# Patient Record
Sex: FEMALE | Race: BLACK OR AFRICAN AMERICAN | NOT HISPANIC OR LATINO | RURAL
[De-identification: names, ages, dates, MRNs, and addresses within clinical notes are randomized per-mention and may not be internally consistent; named-entity substitution may affect disease eponyms.]

---

## 2020-06-16 ENCOUNTER — HISTORICAL (OUTPATIENT)
Dept: ADMINISTRATIVE | Facility: HOSPITAL | Age: 25
End: 2020-06-16

## 2020-06-23 LAB
LAB AP COMMENTS: NORMAL
LAB AP GENERAL CAT - HISTORICAL: NORMAL
LAB AP INTERPRETATION/RESULT - HISTORICAL: NORMAL
LAB AP SPECIMEN ADEQUACY - HISTORICAL: NORMAL
LAB AP SPECIMEN SUBMITTED - HISTORICAL: NORMAL
PAP RECOMMENDATION EXT: NORMAL

## 2021-04-27 ENCOUNTER — CLINICAL SUPPORT (OUTPATIENT)
Dept: OBSTETRICS AND GYNECOLOGY | Facility: CLINIC | Age: 26
End: 2021-04-27
Payer: MEDICAID

## 2021-04-27 VITALS
RESPIRATION RATE: 14 BRPM | HEART RATE: 66 BPM | SYSTOLIC BLOOD PRESSURE: 118 MMHG | HEIGHT: 66 IN | TEMPERATURE: 98 F | WEIGHT: 149 LBS | DIASTOLIC BLOOD PRESSURE: 68 MMHG | BODY MASS INDEX: 23.95 KG/M2

## 2021-04-27 DIAGNOSIS — Z30.42 DEPO-PROVERA CONTRACEPTIVE STATUS: ICD-10-CM

## 2021-04-27 DIAGNOSIS — Z30.42 ENCOUNTER FOR SURVEILLANCE OF INJECTABLE CONTRACEPTIVE: Primary | ICD-10-CM

## 2021-04-27 PROCEDURE — 96372 PR INJECTION,THERAP/PROPH/DIAG2ST, IM OR SUBCUT: ICD-10-PCS | Mod: ,,, | Performed by: MIDWIFE

## 2021-04-27 PROCEDURE — 96372 THER/PROPH/DIAG INJ SC/IM: CPT | Mod: ,,, | Performed by: MIDWIFE

## 2021-04-27 PROCEDURE — 99213 OFFICE O/P EST LOW 20 MIN: CPT | Mod: 25,,, | Performed by: MIDWIFE

## 2021-04-27 PROCEDURE — 99213 PR OFFICE/OUTPT VISIT, EST, LEVL III, 20-29 MIN: ICD-10-PCS | Mod: 25,,, | Performed by: MIDWIFE

## 2021-04-27 RX ORDER — MEDROXYPROGESTERONE ACETATE 150 MG/ML
150 INJECTION, SUSPENSION INTRAMUSCULAR
COMMUNITY
End: 2022-12-21 | Stop reason: ALTCHOICE

## 2021-04-27 RX ORDER — MEDROXYPROGESTERONE ACETATE 150 MG/ML
150 INJECTION, SUSPENSION INTRAMUSCULAR
Status: COMPLETED | OUTPATIENT
Start: 2021-04-27 | End: 2021-04-27

## 2021-04-27 RX ADMIN — MEDROXYPROGESTERONE ACETATE 150 MG: 150 INJECTION, SUSPENSION INTRAMUSCULAR at 09:04

## 2021-07-13 ENCOUNTER — OFFICE VISIT (OUTPATIENT)
Dept: OBSTETRICS AND GYNECOLOGY | Facility: CLINIC | Age: 26
End: 2021-07-13
Payer: MEDICAID

## 2021-07-13 VITALS
RESPIRATION RATE: 16 BRPM | TEMPERATURE: 97 F | BODY MASS INDEX: 24.86 KG/M2 | WEIGHT: 149.19 LBS | DIASTOLIC BLOOD PRESSURE: 70 MMHG | HEART RATE: 80 BPM | SYSTOLIC BLOOD PRESSURE: 100 MMHG | HEIGHT: 65 IN

## 2021-07-13 DIAGNOSIS — Z30.42 DEPO-PROVERA CONTRACEPTIVE STATUS: Primary | ICD-10-CM

## 2021-07-13 DIAGNOSIS — Z30.42 ENCOUNTER FOR SURVEILLANCE OF INJECTABLE CONTRACEPTIVE: ICD-10-CM

## 2021-07-13 PROCEDURE — 96372 THER/PROPH/DIAG INJ SC/IM: CPT | Mod: ,,, | Performed by: MIDWIFE

## 2021-07-13 PROCEDURE — 99213 PR OFFICE/OUTPT VISIT, EST, LEVL III, 20-29 MIN: ICD-10-PCS | Mod: 25,,, | Performed by: MIDWIFE

## 2021-07-13 PROCEDURE — 96372 PR INJECTION,THERAP/PROPH/DIAG2ST, IM OR SUBCUT: ICD-10-PCS | Mod: ,,, | Performed by: MIDWIFE

## 2021-07-13 PROCEDURE — 99213 OFFICE O/P EST LOW 20 MIN: CPT | Mod: 25,,, | Performed by: MIDWIFE

## 2021-07-13 RX ORDER — MEDROXYPROGESTERONE ACETATE 150 MG/ML
150 INJECTION, SUSPENSION INTRAMUSCULAR
Status: COMPLETED | OUTPATIENT
Start: 2021-07-13 | End: 2021-07-13

## 2021-07-13 RX ADMIN — MEDROXYPROGESTERONE ACETATE 150 MG: 150 INJECTION, SUSPENSION INTRAMUSCULAR at 08:07

## 2021-08-17 ENCOUNTER — OFFICE VISIT (OUTPATIENT)
Dept: FAMILY MEDICINE | Facility: CLINIC | Age: 26
End: 2021-08-17
Payer: MEDICAID

## 2021-08-17 VITALS
BODY MASS INDEX: 24.99 KG/M2 | HEIGHT: 65 IN | WEIGHT: 150 LBS | TEMPERATURE: 98 F | RESPIRATION RATE: 20 BRPM | OXYGEN SATURATION: 99 % | HEART RATE: 105 BPM

## 2021-08-17 DIAGNOSIS — Z20.822 EXPOSURE TO COVID-19 VIRUS: Primary | ICD-10-CM

## 2021-08-17 LAB
CTP QC/QA: YES
FLUAV AG NPH QL: NEGATIVE
FLUBV AG NPH QL: NEGATIVE
SARS-COV-2 AG RESP QL IA.RAPID: NEGATIVE

## 2021-08-17 PROCEDURE — 99202 OFFICE O/P NEW SF 15 MIN: CPT | Mod: ,,, | Performed by: NURSE PRACTITIONER

## 2021-08-17 PROCEDURE — 99202 PR OFFICE/OUTPT VISIT, NEW, LEVL II, 15-29 MIN: ICD-10-PCS | Mod: ,,, | Performed by: NURSE PRACTITIONER

## 2021-08-17 PROCEDURE — 87428 SARSCOV & INF VIR A&B AG IA: CPT | Mod: RHCUB | Performed by: NURSE PRACTITIONER

## 2022-12-12 ENCOUNTER — OFFICE VISIT (OUTPATIENT)
Dept: OBSTETRICS AND GYNECOLOGY | Facility: CLINIC | Age: 27
End: 2022-12-12
Payer: MEDICAID

## 2022-12-12 VITALS
RESPIRATION RATE: 17 BRPM | HEART RATE: 92 BPM | HEIGHT: 65 IN | WEIGHT: 142.38 LBS | BODY MASS INDEX: 23.72 KG/M2 | DIASTOLIC BLOOD PRESSURE: 68 MMHG | SYSTOLIC BLOOD PRESSURE: 110 MMHG | OXYGEN SATURATION: 99 %

## 2022-12-12 DIAGNOSIS — N92.6 MENSTRUAL PERIOD LATE: Primary | ICD-10-CM

## 2022-12-12 DIAGNOSIS — N92.6 LATE MENSES: ICD-10-CM

## 2022-12-12 DIAGNOSIS — Z32.02 URINE PREGNANCY TEST NEGATIVE: ICD-10-CM

## 2022-12-12 LAB
B-HCG UR QL: NEGATIVE
CTP QC/QA: YES
HCG SERPL-ACNC: <1 MIU/ML

## 2022-12-12 PROCEDURE — 99213 PR OFFICE/OUTPT VISIT, EST, LEVL III, 20-29 MIN: ICD-10-PCS | Mod: ,,, | Performed by: ADVANCED PRACTICE MIDWIFE

## 2022-12-12 PROCEDURE — 99213 OFFICE O/P EST LOW 20 MIN: CPT | Mod: ,,, | Performed by: ADVANCED PRACTICE MIDWIFE

## 2022-12-12 PROCEDURE — 3074F PR MOST RECENT SYSTOLIC BLOOD PRESSURE < 130 MM HG: ICD-10-PCS | Mod: CPTII,,, | Performed by: ADVANCED PRACTICE MIDWIFE

## 2022-12-12 PROCEDURE — 1159F PR MEDICATION LIST DOCUMENTED IN MEDICAL RECORD: ICD-10-PCS | Mod: CPTII,,, | Performed by: ADVANCED PRACTICE MIDWIFE

## 2022-12-12 PROCEDURE — 81025 URINE PREGNANCY TEST: CPT | Mod: RHCUB | Performed by: ADVANCED PRACTICE MIDWIFE

## 2022-12-12 PROCEDURE — 84702 CHORIONIC GONADOTROPIN TEST: CPT | Mod: ,,, | Performed by: CLINICAL MEDICAL LABORATORY

## 2022-12-12 PROCEDURE — 3078F DIAST BP <80 MM HG: CPT | Mod: CPTII,,, | Performed by: ADVANCED PRACTICE MIDWIFE

## 2022-12-12 PROCEDURE — 1159F MED LIST DOCD IN RCRD: CPT | Mod: CPTII,,, | Performed by: ADVANCED PRACTICE MIDWIFE

## 2022-12-12 PROCEDURE — 3078F PR MOST RECENT DIASTOLIC BLOOD PRESSURE < 80 MM HG: ICD-10-PCS | Mod: CPTII,,, | Performed by: ADVANCED PRACTICE MIDWIFE

## 2022-12-12 PROCEDURE — 3008F PR BODY MASS INDEX (BMI) DOCUMENTED: ICD-10-PCS | Mod: CPTII,,, | Performed by: ADVANCED PRACTICE MIDWIFE

## 2022-12-12 PROCEDURE — 3074F SYST BP LT 130 MM HG: CPT | Mod: CPTII,,, | Performed by: ADVANCED PRACTICE MIDWIFE

## 2022-12-12 PROCEDURE — 84702 HCG, TOTAL, QUANTITATIVE: ICD-10-PCS | Mod: ,,, | Performed by: CLINICAL MEDICAL LABORATORY

## 2022-12-12 PROCEDURE — 3008F BODY MASS INDEX DOCD: CPT | Mod: CPTII,,, | Performed by: ADVANCED PRACTICE MIDWIFE

## 2022-12-12 NOTE — PROGRESS NOTES
"Patient ID:  Alana Hdez is a 27 y.o. female      Chief Complaint:   Chief Complaint   Patient presents with    Amenorrhea    Possible Pregnancy        HPI:  Alana is in for pregnancy testing. She has had negative home UPT, reports monthly cycles which are never late. Last cycle 2022. States had negative UPT initially with previous pregnancy. Sexually active, not on contraceptive, discussed contraceptive options including r/b and possible s/e of implant IUD, injection, ring, patches, and pill. She is considering use of NuvaRing if pregnancy testing negative.   LMP: Patient's last menstrual period was 2022.   Sexually active:  yes  Contraceptive: none      History reviewed. No pertinent past medical history.  History reviewed. No pertinent surgical history.    OB History          1    Para   1    Term   1            AB        Living   1         SAB        IAB        Ectopic        Multiple        Live Births   1                 /68 (BP Location: Right arm)   Pulse 92   Resp 17   Ht 5' 5" (1.651 m)   Wt 64.6 kg (142 lb 6.4 oz)   LMP 2022   SpO2 99%   BMI 23.70 kg/m²   Wt Readings from Last 3 Encounters:   22 64.6 kg (142 lb 6.4 oz)   21 68 kg (150 lb)   21 67.7 kg (149 lb 3.2 oz)          ROS:  Review of Systems   Constitutional: Negative.    Eyes: Negative.    Respiratory: Negative.     Cardiovascular: Negative.    Gastrointestinal: Negative.    Genitourinary:  Positive for menstrual problem.        Late menstrual cycle   Musculoskeletal: Negative.    Neurological: Negative.    Psychiatric/Behavioral: Negative.          PHYSICAL EXAM:  Physical Exam  Constitutional:       Appearance: Normal appearance. She is normal weight.   Eyes:      Extraocular Movements: Extraocular movements intact.   Cardiovascular:      Rate and Rhythm: Normal rate.      Pulses: Normal pulses.   Pulmonary:      Effort: Pulmonary effort is normal.   Abdominal:      " Palpations: Abdomen is soft.   Musculoskeletal:         General: Normal range of motion.      Cervical back: Normal range of motion.   Skin:     General: Skin is warm and dry.   Neurological:      Mental Status: She is alert and oriented to person, place, and time.   Psychiatric:         Mood and Affect: Mood normal.         Behavior: Behavior normal.         Thought Content: Thought content normal.         Judgment: Judgment normal.        Assessment:  Alana was seen today for amenorrhea and possible pregnancy.    Diagnoses and all orders for this visit:    Menstrual period late  -     POCT urine pregnancy  -     hCG, Total, Quantitative; Future  -     hCG, Total, Quantitative    Late menses    BMI 23.0-23.9, adult    Urine pregnancy test negative          ICD-10-CM ICD-9-CM    1. Menstrual period late  N92.6 626.8 POCT urine pregnancy      hCG, Total, Quantitative      hCG, Total, Quantitative      2. Late menses  N92.6 626.8       3. BMI 23.0-23.9, adult  Z68.23 V85.1       4. Urine pregnancy test negative  Z32.02 V72.41           Plan:  UPT negative, hCG level ordered  Will call or send My Chart message after lab reviewed  Discussed contraceptive options as listed above  Will send rx NuvaRing if she desires contraceptive    Follow up for care prn.

## 2022-12-14 PROBLEM — N92.6 LATE MENSES: Status: ACTIVE | Noted: 2022-12-14

## 2022-12-14 PROBLEM — Z30.42 DEPO-PROVERA CONTRACEPTIVE STATUS: Status: RESOLVED | Noted: 2021-04-27 | Resolved: 2022-12-14

## 2022-12-21 ENCOUNTER — OFFICE VISIT (OUTPATIENT)
Dept: OBSTETRICS AND GYNECOLOGY | Facility: CLINIC | Age: 27
End: 2022-12-21
Payer: MEDICAID

## 2022-12-21 VITALS
RESPIRATION RATE: 17 BRPM | DIASTOLIC BLOOD PRESSURE: 73 MMHG | WEIGHT: 145.63 LBS | OXYGEN SATURATION: 99 % | BODY MASS INDEX: 24.26 KG/M2 | HEIGHT: 65 IN | SYSTOLIC BLOOD PRESSURE: 127 MMHG | HEART RATE: 69 BPM

## 2022-12-21 DIAGNOSIS — Z72.51 HIGH RISK SEXUAL BEHAVIOR, UNSPECIFIED TYPE: ICD-10-CM

## 2022-12-21 DIAGNOSIS — Z30.013 ENCOUNTER FOR INITIAL PRESCRIPTION OF INJECTABLE CONTRACEPTIVE: Primary | ICD-10-CM

## 2022-12-21 LAB
CANDIDA SPECIES: NEGATIVE
GARDNERELLA: POSITIVE
TRICHOMONAS: NEGATIVE

## 2022-12-21 PROCEDURE — 99212 OFFICE O/P EST SF 10 MIN: CPT | Mod: 25,,, | Performed by: ADVANCED PRACTICE MIDWIFE

## 2022-12-21 PROCEDURE — 3008F PR BODY MASS INDEX (BMI) DOCUMENTED: ICD-10-PCS | Mod: CPTII,,, | Performed by: ADVANCED PRACTICE MIDWIFE

## 2022-12-21 PROCEDURE — 1159F PR MEDICATION LIST DOCUMENTED IN MEDICAL RECORD: ICD-10-PCS | Mod: CPTII,,, | Performed by: ADVANCED PRACTICE MIDWIFE

## 2022-12-21 PROCEDURE — 3078F DIAST BP <80 MM HG: CPT | Mod: CPTII,,, | Performed by: ADVANCED PRACTICE MIDWIFE

## 2022-12-21 PROCEDURE — 96372 THER/PROPH/DIAG INJ SC/IM: CPT | Mod: ,,, | Performed by: ADVANCED PRACTICE MIDWIFE

## 2022-12-21 PROCEDURE — 87660 BACTERIAL VAGINOSIS: ICD-10-PCS | Mod: ,,, | Performed by: CLINICAL MEDICAL LABORATORY

## 2022-12-21 PROCEDURE — 87480 CANDIDA DNA DIR PROBE: CPT | Mod: ,,, | Performed by: CLINICAL MEDICAL LABORATORY

## 2022-12-21 PROCEDURE — 3074F SYST BP LT 130 MM HG: CPT | Mod: CPTII,,, | Performed by: ADVANCED PRACTICE MIDWIFE

## 2022-12-21 PROCEDURE — 96372 PR INJECTION,THERAP/PROPH/DIAG2ST, IM OR SUBCUT: ICD-10-PCS | Mod: ,,, | Performed by: ADVANCED PRACTICE MIDWIFE

## 2022-12-21 PROCEDURE — 3074F PR MOST RECENT SYSTOLIC BLOOD PRESSURE < 130 MM HG: ICD-10-PCS | Mod: CPTII,,, | Performed by: ADVANCED PRACTICE MIDWIFE

## 2022-12-21 PROCEDURE — 87480 BACTERIAL VAGINOSIS: ICD-10-PCS | Mod: ,,, | Performed by: CLINICAL MEDICAL LABORATORY

## 2022-12-21 PROCEDURE — 87660 TRICHOMONAS VAGIN DIR PROBE: CPT | Mod: ,,, | Performed by: CLINICAL MEDICAL LABORATORY

## 2022-12-21 PROCEDURE — 1159F MED LIST DOCD IN RCRD: CPT | Mod: CPTII,,, | Performed by: ADVANCED PRACTICE MIDWIFE

## 2022-12-21 PROCEDURE — 87510 BACTERIAL VAGINOSIS: ICD-10-PCS | Mod: ,,, | Performed by: CLINICAL MEDICAL LABORATORY

## 2022-12-21 PROCEDURE — 3008F BODY MASS INDEX DOCD: CPT | Mod: CPTII,,, | Performed by: ADVANCED PRACTICE MIDWIFE

## 2022-12-21 PROCEDURE — 99212 PR OFFICE/OUTPT VISIT, EST, LEVL II, 10-19 MIN: ICD-10-PCS | Mod: 25,,, | Performed by: ADVANCED PRACTICE MIDWIFE

## 2022-12-21 PROCEDURE — 87510 GARDNER VAG DNA DIR PROBE: CPT | Mod: ,,, | Performed by: CLINICAL MEDICAL LABORATORY

## 2022-12-21 PROCEDURE — 3078F PR MOST RECENT DIASTOLIC BLOOD PRESSURE < 80 MM HG: ICD-10-PCS | Mod: CPTII,,, | Performed by: ADVANCED PRACTICE MIDWIFE

## 2022-12-21 RX ORDER — MEDROXYPROGESTERONE ACETATE 150 MG/ML
150 INJECTION, SUSPENSION INTRAMUSCULAR
Status: COMPLETED | OUTPATIENT
Start: 2022-12-21 | End: 2022-12-21

## 2022-12-21 RX ADMIN — MEDROXYPROGESTERONE ACETATE 150 MG: 150 INJECTION, SUSPENSION INTRAMUSCULAR at 01:12

## 2022-12-21 NOTE — PROGRESS NOTES
"    Patient ID:  Alana Hdez is a 27 y.o. female      Chief Complaint:   Chief Complaint   Patient presents with    Blood when urinating        HPI: Ms Hdez started her period on 22 and is still notices some blood on th e tissue when she wipes.  She is concerned about this.   She is no on any b;irth control at this time.  We talked about option and she wants Depo.  She has used it in the past.   Her last periods was a week late so she did a home preg. Test during her cycle and it was negative.  Last intercourse was 12/10/22      LMP: Patient's last menstrual period was 2022.  Sexually active: yes    History reviewed. No pertinent past medical history.    History reviewed. No pertinent surgical history.    OB History          1    Para   1    Term   1            AB        Living   1         SAB        IAB        Ectopic        Multiple        Live Births   1                 /73 (BP Location: Right arm)   Pulse 69   Resp 17   Ht 5' 5" (1.651 m)   Wt 66 kg (145 lb 9.6 oz)   LMP 2022   SpO2 99%   BMI 24.23 kg/m²   Wt Readings from Last 3 Encounters:   22 66 kg (145 lb 9.6 oz)   22 64.6 kg (142 lb 6.4 oz)   21 68 kg (150 lb)          ROS:  Review of Systems   HENT: Negative.     Gastrointestinal: Negative.    Genitourinary:  Positive for vaginal bleeding.   Musculoskeletal: Negative.    All other systems reviewed and are negative.       PHYSICAL EXAM:  Physical Exam  Constitutional:       Appearance: Normal appearance.   HENT:      Head: Normocephalic.   Eyes:      Extraocular Movements: Extraocular movements intact.   Cardiovascular:      Rate and Rhythm: Normal rate.      Pulses: Normal pulses.   Pulmonary:      Effort: Pulmonary effort is normal.   Musculoskeletal:         General: Normal range of motion.      Cervical back: Normal range of motion.   Skin:     General: Skin is warm and dry.   Neurological:      Mental Status: She is alert and " "oriented to person, place, and time.   Psychiatric:         Mood and Affect: Mood normal.         Behavior: Behavior normal.         Thought Content: Thought content normal.         Judgment: Judgment normal.        Assessment:  Encounter for initial prescription of injectable contraceptive  -     medroxyPROGESTERone (DEPO-PROVERA) injection 150 mg    High risk sexual behavior, unspecified type  -     Bacterial Vaginosis; Future; Expected date: 12/21/2022          ICD-10-CM ICD-9-CM    1. Encounter for initial prescription of injectable contraceptive  Z30.013 V25.02 medroxyPROGESTERone (DEPO-PROVERA) injection 150 mg      2. High risk sexual behavior, unspecified type  Z72.51 V69.2 Bacterial Vaginosis      Bacterial Vaginosis          Plan:Call result of Affirm  Depo Provera 150 mg IM given, Reviewed "ACHES" of contraceptives and possible S/E  Encouraged Vitamin D and calcium supplements or daily multivitamin  Instructed on condom use during each sexual encounter    Follow up in about 2 months (around 2/21/2023) for annual and return 3 months for Depo.                   "

## 2022-12-22 ENCOUNTER — PATIENT MESSAGE (OUTPATIENT)
Dept: OBSTETRICS AND GYNECOLOGY | Facility: CLINIC | Age: 27
End: 2022-12-22
Payer: MEDICAID

## 2022-12-22 DIAGNOSIS — B96.89 BACTERIAL VAGINOSIS: Primary | ICD-10-CM

## 2022-12-22 DIAGNOSIS — N76.0 BACTERIAL VAGINOSIS: Primary | ICD-10-CM

## 2022-12-22 RX ORDER — METRONIDAZOLE 500 MG/1
500 TABLET ORAL 2 TIMES DAILY
Qty: 14 TABLET | Refills: 0 | Status: SHIPPED | OUTPATIENT
Start: 2022-12-22 | End: 2022-12-29

## 2023-03-08 ENCOUNTER — OFFICE VISIT (OUTPATIENT)
Dept: OBSTETRICS AND GYNECOLOGY | Facility: CLINIC | Age: 28
End: 2023-03-08
Payer: MEDICAID

## 2023-03-08 VITALS
TEMPERATURE: 99 F | OXYGEN SATURATION: 99 % | RESPIRATION RATE: 18 BRPM | DIASTOLIC BLOOD PRESSURE: 59 MMHG | HEIGHT: 65 IN | BODY MASS INDEX: 22.99 KG/M2 | HEART RATE: 87 BPM | WEIGHT: 138 LBS | SYSTOLIC BLOOD PRESSURE: 95 MMHG

## 2023-03-08 DIAGNOSIS — Z30.42 SURVEILLANCE FOR DEPO-PROVERA CONTRACEPTION: Primary | ICD-10-CM

## 2023-03-08 PROCEDURE — 1159F MED LIST DOCD IN RCRD: CPT | Mod: CPTII,,, | Performed by: ADVANCED PRACTICE MIDWIFE

## 2023-03-08 PROCEDURE — 3008F PR BODY MASS INDEX (BMI) DOCUMENTED: ICD-10-PCS | Mod: CPTII,,, | Performed by: ADVANCED PRACTICE MIDWIFE

## 2023-03-08 PROCEDURE — 99212 OFFICE O/P EST SF 10 MIN: CPT | Mod: 25,,, | Performed by: ADVANCED PRACTICE MIDWIFE

## 2023-03-08 PROCEDURE — 99212 PR OFFICE/OUTPT VISIT, EST, LEVL II, 10-19 MIN: ICD-10-PCS | Mod: 25,,, | Performed by: ADVANCED PRACTICE MIDWIFE

## 2023-03-08 PROCEDURE — 3074F SYST BP LT 130 MM HG: CPT | Mod: CPTII,,, | Performed by: ADVANCED PRACTICE MIDWIFE

## 2023-03-08 PROCEDURE — 96372 THER/PROPH/DIAG INJ SC/IM: CPT | Mod: ,,, | Performed by: ADVANCED PRACTICE MIDWIFE

## 2023-03-08 PROCEDURE — 96372 PR INJECTION,THERAP/PROPH/DIAG2ST, IM OR SUBCUT: ICD-10-PCS | Mod: ,,, | Performed by: ADVANCED PRACTICE MIDWIFE

## 2023-03-08 PROCEDURE — 3078F DIAST BP <80 MM HG: CPT | Mod: CPTII,,, | Performed by: ADVANCED PRACTICE MIDWIFE

## 2023-03-08 PROCEDURE — 1159F PR MEDICATION LIST DOCUMENTED IN MEDICAL RECORD: ICD-10-PCS | Mod: CPTII,,, | Performed by: ADVANCED PRACTICE MIDWIFE

## 2023-03-08 PROCEDURE — 3078F PR MOST RECENT DIASTOLIC BLOOD PRESSURE < 80 MM HG: ICD-10-PCS | Mod: CPTII,,, | Performed by: ADVANCED PRACTICE MIDWIFE

## 2023-03-08 PROCEDURE — 3008F BODY MASS INDEX DOCD: CPT | Mod: CPTII,,, | Performed by: ADVANCED PRACTICE MIDWIFE

## 2023-03-08 PROCEDURE — 3074F PR MOST RECENT SYSTOLIC BLOOD PRESSURE < 130 MM HG: ICD-10-PCS | Mod: CPTII,,, | Performed by: ADVANCED PRACTICE MIDWIFE

## 2023-03-08 RX ORDER — MEDROXYPROGESTERONE ACETATE 150 MG/ML
150 INJECTION, SUSPENSION INTRAMUSCULAR
Status: COMPLETED | OUTPATIENT
Start: 2023-03-08 | End: 2023-03-08

## 2023-03-08 RX ADMIN — MEDROXYPROGESTERONE ACETATE 150 MG: 150 INJECTION, SUSPENSION INTRAMUSCULAR at 02:03

## 2023-03-08 NOTE — PROGRESS NOTES
"    Patient ID:  Alana Hdez is a 28 y.o. female      Chief Complaint:   Chief Complaint   Patient presents with    Contraception     Room 4// depo        HPI:  Alana Hdez is in for repeat depo injection within dates. Denies ACHES, vag discharge, and abnormal vag bleeding. No issues, problems, or complaints. Desires to continue depo injections.   LMP: Patient's last menstrual period was 2023 (exact date).  Sexually active: yes    No past medical history on file.    No past surgical history on file.    OB History          1    Para   1    Term   1            AB        Living   1         SAB        IAB        Ectopic        Multiple        Live Births   1                 BP (!) 95/59   Pulse 87   Temp 98.7 °F (37.1 °C) (Oral)   Resp 18   Ht 5' 5" (1.651 m)   Wt 62.6 kg (138 lb)   LMP 2023 (Exact Date)   SpO2 99%   BMI 22.96 kg/m²   Wt Readings from Last 3 Encounters:   23 62.6 kg (138 lb)   22 66 kg (145 lb 9.6 oz)   22 64.6 kg (142 lb 6.4 oz)          ROS:  Review of Systems   Constitutional: Negative.    HENT: Negative.     Respiratory: Negative.     Cardiovascular: Negative.    Gastrointestinal: Negative.    Genitourinary: Negative.    Musculoskeletal: Negative.    Integumentary:  Negative.   Neurological: Negative.    Psychiatric/Behavioral: Negative.     Breast: negative.         PHYSICAL EXAM:  Physical Exam  Vitals and nursing note reviewed.   Constitutional:       Appearance: Normal appearance.   HENT:      Head: Normocephalic.   Eyes:      Extraocular Movements: Extraocular movements intact.   Cardiovascular:      Rate and Rhythm: Normal rate.      Pulses: Normal pulses.   Pulmonary:      Effort: Pulmonary effort is normal.   Musculoskeletal:         General: Normal range of motion.      Cervical back: Normal range of motion.   Skin:     General: Skin is warm and dry.   Neurological:      Mental Status: She is alert and oriented to " "person, place, and time.   Psychiatric:         Mood and Affect: Mood normal.         Behavior: Behavior normal.         Thought Content: Thought content normal.         Judgment: Judgment normal.        Assessment:  Surveillance for Depo-Provera contraception  -     medroxyPROGESTERone (DEPO-PROVERA) injection 150 mg          ICD-10-CM ICD-9-CM    1. Surveillance for Depo-Provera contraception  Z30.42 V25.49 medroxyPROGESTERone (DEPO-PROVERA) injection 150 mg          Plan:  Depo Provera 150 mg IM given, Reviewed "ACHES" of contraceptives and possible S/E  Encouraged Vitamin D and calcium supplements or daily multivitamin  Instructed on condom use during each sexual encounter to decrease STD exposure risk    No follow-ups on file.                   "

## 2023-05-25 ENCOUNTER — PATIENT OUTREACH (OUTPATIENT)
Dept: OBSTETRICS AND GYNECOLOGY | Facility: CLINIC | Age: 28
End: 2023-05-25
Payer: MEDICAID

## 2023-05-25 ENCOUNTER — OFFICE VISIT (OUTPATIENT)
Dept: OBSTETRICS AND GYNECOLOGY | Facility: CLINIC | Age: 28
End: 2023-05-25
Payer: MEDICAID

## 2023-05-25 VITALS
WEIGHT: 154.19 LBS | BODY MASS INDEX: 25.69 KG/M2 | HEART RATE: 91 BPM | SYSTOLIC BLOOD PRESSURE: 109 MMHG | RESPIRATION RATE: 17 BRPM | OXYGEN SATURATION: 99 % | HEIGHT: 65 IN | TEMPERATURE: 98 F | DIASTOLIC BLOOD PRESSURE: 73 MMHG

## 2023-05-25 DIAGNOSIS — Z30.42 SURVEILLANCE FOR DEPO-PROVERA CONTRACEPTION: Primary | ICD-10-CM

## 2023-05-25 PROBLEM — N92.6 LATE MENSES: Status: RESOLVED | Noted: 2022-12-14 | Resolved: 2023-05-25

## 2023-05-25 PROCEDURE — 99499 UNLISTED E&M SERVICE: CPT | Mod: ,,, | Performed by: ADVANCED PRACTICE MIDWIFE

## 2023-05-25 PROCEDURE — 1159F MED LIST DOCD IN RCRD: CPT | Mod: CPTII,,, | Performed by: ADVANCED PRACTICE MIDWIFE

## 2023-05-25 PROCEDURE — 3074F PR MOST RECENT SYSTOLIC BLOOD PRESSURE < 130 MM HG: ICD-10-PCS | Mod: CPTII,,, | Performed by: ADVANCED PRACTICE MIDWIFE

## 2023-05-25 PROCEDURE — 3078F PR MOST RECENT DIASTOLIC BLOOD PRESSURE < 80 MM HG: ICD-10-PCS | Mod: CPTII,,, | Performed by: ADVANCED PRACTICE MIDWIFE

## 2023-05-25 PROCEDURE — 3074F SYST BP LT 130 MM HG: CPT | Mod: CPTII,,, | Performed by: ADVANCED PRACTICE MIDWIFE

## 2023-05-25 PROCEDURE — 1159F PR MEDICATION LIST DOCUMENTED IN MEDICAL RECORD: ICD-10-PCS | Mod: CPTII,,, | Performed by: ADVANCED PRACTICE MIDWIFE

## 2023-05-25 PROCEDURE — 3078F DIAST BP <80 MM HG: CPT | Mod: CPTII,,, | Performed by: ADVANCED PRACTICE MIDWIFE

## 2023-05-25 PROCEDURE — 99499 NO LOS: ICD-10-PCS | Mod: ,,, | Performed by: ADVANCED PRACTICE MIDWIFE

## 2023-05-25 PROCEDURE — 3008F PR BODY MASS INDEX (BMI) DOCUMENTED: ICD-10-PCS | Mod: CPTII,,, | Performed by: ADVANCED PRACTICE MIDWIFE

## 2023-05-25 PROCEDURE — 96372 PR INJECTION,THERAP/PROPH/DIAG2ST, IM OR SUBCUT: ICD-10-PCS | Mod: ,,, | Performed by: ADVANCED PRACTICE MIDWIFE

## 2023-05-25 PROCEDURE — 96372 THER/PROPH/DIAG INJ SC/IM: CPT | Mod: ,,, | Performed by: ADVANCED PRACTICE MIDWIFE

## 2023-05-25 PROCEDURE — 3008F BODY MASS INDEX DOCD: CPT | Mod: CPTII,,, | Performed by: ADVANCED PRACTICE MIDWIFE

## 2023-05-25 RX ORDER — MEDROXYPROGESTERONE ACETATE 150 MG/ML
150 INJECTION, SUSPENSION INTRAMUSCULAR ONCE
Status: COMPLETED | OUTPATIENT
Start: 2023-05-25 | End: 2023-05-25

## 2023-05-25 RX ADMIN — MEDROXYPROGESTERONE ACETATE 150 MG: 150 INJECTION, SUSPENSION INTRAMUSCULAR at 08:05

## 2023-05-25 NOTE — PROGRESS NOTES
"  Patient ID:  Alana Hdez is a 28 y.o. female      Chief Complaint:   Chief Complaint   Patient presents with    Contraception     Depo        HPI:  Alana Hdez is in for repeat depo injection within dates. Denies ACHES, vag discharge, and abnormal vag bleeding. No issues, problems, or complaints. Desires to continue depo injections. Discussed last Pap 2020 ASCUS, due for annual exam with Pap, encouraged to schedule.   LMP: No LMP recorded. Patient has had an injection.  Sexually active: yes    History reviewed. No pertinent past medical history.    History reviewed. No pertinent surgical history.    OB History          1    Para   1    Term   1            AB        Living   1         SAB        IAB        Ectopic        Multiple        Live Births   1                 /73 (BP Location: Right arm)   Pulse 91   Temp 97.7 °F (36.5 °C)   Resp 17   Ht 5' 5" (1.651 m)   Wt 69.9 kg (154 lb 3.2 oz)   SpO2 99%   BMI 25.66 kg/m²   Wt Readings from Last 3 Encounters:   23 69.9 kg (154 lb 3.2 oz)   23 62.6 kg (138 lb)   22 66 kg (145 lb 9.6 oz)          ROS:  Review of Systems   Constitutional: Negative.    Eyes: Negative.    Respiratory: Negative.     Cardiovascular: Negative.    Gastrointestinal: Negative.    Genitourinary: Negative.    Musculoskeletal: Negative.    Neurological: Negative.    Psychiatric/Behavioral: Negative.          PHYSICAL EXAM:  Physical Exam  Constitutional:       Appearance: Normal appearance. She is normal weight.   Eyes:      Extraocular Movements: Extraocular movements intact.   Cardiovascular:      Rate and Rhythm: Normal rate.      Pulses: Normal pulses.   Pulmonary:      Effort: Pulmonary effort is normal.   Musculoskeletal:         General: Normal range of motion.      Cervical back: Normal range of motion.   Skin:     General: Skin is warm and dry.   Neurological:      Mental Status: She is alert and oriented to person, " "place, and time.   Psychiatric:         Mood and Affect: Mood normal.         Behavior: Behavior normal.         Thought Content: Thought content normal.         Judgment: Judgment normal.        Assessment:  Surveillance for Depo-Provera contraception  -     medroxyPROGESTERone (DEPO-PROVERA) syringe 150 mg    BMI 25.0-25.9,adult          ICD-10-CM ICD-9-CM    1. Surveillance for Depo-Provera contraception  Z30.42 V25.49 medroxyPROGESTERone (DEPO-PROVERA) syringe 150 mg      2. BMI 25.0-25.9,adult  Z68.25 V85.21           Plan:  Depo Provera 150 mg IM given, Reviewed "ACHES" of contraceptives and possible S/E  Encouraged Vitamin D and calcium supplements or daily multivitamin  Instructed on condom use during each sexual encounter to decrease STD exposure risk  Discussed Pap testing guidelines, due for annual exam with Pap    Follow up in about 3 months (around 8/25/2023) for repeat depo injection.                    "

## 2023-06-12 ENCOUNTER — OFFICE VISIT (OUTPATIENT)
Dept: OBSTETRICS AND GYNECOLOGY | Facility: CLINIC | Age: 28
End: 2023-06-12
Payer: MEDICAID

## 2023-06-12 VITALS
TEMPERATURE: 99 F | HEIGHT: 65 IN | OXYGEN SATURATION: 99 % | SYSTOLIC BLOOD PRESSURE: 112 MMHG | WEIGHT: 151.63 LBS | DIASTOLIC BLOOD PRESSURE: 79 MMHG | BODY MASS INDEX: 25.26 KG/M2 | HEART RATE: 86 BPM

## 2023-06-12 DIAGNOSIS — Z20.2 ENCOUNTER FOR ASSESSMENT OF STD EXPOSURE: ICD-10-CM

## 2023-06-12 DIAGNOSIS — N89.8 VAGINAL DISCHARGE: Primary | ICD-10-CM

## 2023-06-12 LAB
CANDIDA SPECIES: NEGATIVE
GARDNERELLA: POSITIVE
TRICHOMONAS: NEGATIVE

## 2023-06-12 PROCEDURE — 87591 CHLAMYDIA/GONORRHOEAE(GC), PCR: ICD-10-PCS | Mod: ,,, | Performed by: CLINICAL MEDICAL LABORATORY

## 2023-06-12 PROCEDURE — 3074F SYST BP LT 130 MM HG: CPT | Mod: CPTII,,, | Performed by: ADVANCED PRACTICE MIDWIFE

## 2023-06-12 PROCEDURE — 3008F PR BODY MASS INDEX (BMI) DOCUMENTED: ICD-10-PCS | Mod: CPTII,,, | Performed by: ADVANCED PRACTICE MIDWIFE

## 2023-06-12 PROCEDURE — 3074F PR MOST RECENT SYSTOLIC BLOOD PRESSURE < 130 MM HG: ICD-10-PCS | Mod: CPTII,,, | Performed by: ADVANCED PRACTICE MIDWIFE

## 2023-06-12 PROCEDURE — 87491 CHLMYD TRACH DNA AMP PROBE: CPT | Mod: ,,, | Performed by: CLINICAL MEDICAL LABORATORY

## 2023-06-12 PROCEDURE — 87660 BACTERIAL VAGINOSIS: ICD-10-PCS | Mod: ,,, | Performed by: CLINICAL MEDICAL LABORATORY

## 2023-06-12 PROCEDURE — 87480 BACTERIAL VAGINOSIS: ICD-10-PCS | Mod: ,,, | Performed by: CLINICAL MEDICAL LABORATORY

## 2023-06-12 PROCEDURE — 3078F PR MOST RECENT DIASTOLIC BLOOD PRESSURE < 80 MM HG: ICD-10-PCS | Mod: CPTII,,, | Performed by: ADVANCED PRACTICE MIDWIFE

## 2023-06-12 PROCEDURE — 87510 GARDNER VAG DNA DIR PROBE: CPT | Mod: ,,, | Performed by: CLINICAL MEDICAL LABORATORY

## 2023-06-12 PROCEDURE — 87591 N.GONORRHOEAE DNA AMP PROB: CPT | Mod: ,,, | Performed by: CLINICAL MEDICAL LABORATORY

## 2023-06-12 PROCEDURE — 87510 BACTERIAL VAGINOSIS: ICD-10-PCS | Mod: ,,, | Performed by: CLINICAL MEDICAL LABORATORY

## 2023-06-12 PROCEDURE — 99213 PR OFFICE/OUTPT VISIT, EST, LEVL III, 20-29 MIN: ICD-10-PCS | Mod: ,,, | Performed by: ADVANCED PRACTICE MIDWIFE

## 2023-06-12 PROCEDURE — 87491 CHLAMYDIA/GONORRHOEAE(GC), PCR: ICD-10-PCS | Mod: ,,, | Performed by: CLINICAL MEDICAL LABORATORY

## 2023-06-12 PROCEDURE — 1159F PR MEDICATION LIST DOCUMENTED IN MEDICAL RECORD: ICD-10-PCS | Mod: CPTII,,, | Performed by: ADVANCED PRACTICE MIDWIFE

## 2023-06-12 PROCEDURE — 3008F BODY MASS INDEX DOCD: CPT | Mod: CPTII,,, | Performed by: ADVANCED PRACTICE MIDWIFE

## 2023-06-12 PROCEDURE — 87480 CANDIDA DNA DIR PROBE: CPT | Mod: ,,, | Performed by: CLINICAL MEDICAL LABORATORY

## 2023-06-12 PROCEDURE — 1159F MED LIST DOCD IN RCRD: CPT | Mod: CPTII,,, | Performed by: ADVANCED PRACTICE MIDWIFE

## 2023-06-12 PROCEDURE — 99213 OFFICE O/P EST LOW 20 MIN: CPT | Mod: ,,, | Performed by: ADVANCED PRACTICE MIDWIFE

## 2023-06-12 PROCEDURE — 3078F DIAST BP <80 MM HG: CPT | Mod: CPTII,,, | Performed by: ADVANCED PRACTICE MIDWIFE

## 2023-06-12 PROCEDURE — 87660 TRICHOMONAS VAGIN DIR PROBE: CPT | Mod: ,,, | Performed by: CLINICAL MEDICAL LABORATORY

## 2023-06-13 DIAGNOSIS — B96.89 BACTERIAL VAGINAL INFECTION: Primary | ICD-10-CM

## 2023-06-13 DIAGNOSIS — N76.0 BACTERIAL VAGINAL INFECTION: Primary | ICD-10-CM

## 2023-06-13 LAB
CHLAMYDIA BY PCR: NEGATIVE
N. GONORRHOEAE (GC) BY PCR: NEGATIVE

## 2023-06-13 RX ORDER — FLUCONAZOLE 150 MG/1
150 TABLET ORAL
Qty: 2 TABLET | Refills: 0 | Status: SHIPPED | OUTPATIENT
Start: 2023-06-13 | End: 2023-06-21

## 2023-06-13 RX ORDER — METRONIDAZOLE 500 MG/1
500 TABLET ORAL 2 TIMES DAILY
Qty: 14 TABLET | Refills: 0 | Status: SHIPPED | OUTPATIENT
Start: 2023-06-13 | End: 2023-06-20

## 2023-06-14 PROBLEM — N89.8 VAGINAL DISCHARGE: Status: ACTIVE | Noted: 2023-06-14

## 2023-06-15 NOTE — PROGRESS NOTES
"Patient ID:  Alana Hdez is a 28 y.o. female      Chief Complaint:   Chief Complaint   Patient presents with    Vaginal Bleeding     Old blood   Next depo: 8/10  Last pap: 2020        HPI:  Alana is in with concerns about vag discharge/vag bleeding. Reports bleeding is usually bright red but had an episode of brown discharge like old blood for a few day. No bleeding or discharge today. Currently on depo for contraceptive. Discussed brown discharge was probably old blood and light bleeding/spotting is common with depo. Agrees to STD testing.  Due for annual exam with Pap.   LMP: Patient's last menstrual period was 2023.   Sexually active:  yes  Contraceptive: depo      History reviewed. No pertinent past medical history.  History reviewed. No pertinent surgical history.    OB History          1    Para   1    Term   1            AB        Living   1         SAB        IAB        Ectopic        Multiple        Live Births   1                 /79 (BP Location: Right arm, Patient Position: Sitting, BP Method: Large (Automatic))   Pulse 86   Temp 99.2 °F (37.3 °C)   Ht 5' 5" (1.651 m)   Wt 68.8 kg (151 lb 9.6 oz)   LMP 2023   SpO2 99%   BMI 25.23 kg/m²   Wt Readings from Last 3 Encounters:   23 68.8 kg (151 lb 9.6 oz)   23 69.9 kg (154 lb 3.2 oz)   23 62.6 kg (138 lb)          ROS:  Review of Systems   Constitutional: Negative.    Eyes: Negative.    Respiratory: Negative.     Cardiovascular: Negative.    Gastrointestinal: Negative.    Genitourinary:  Positive for vaginal discharge.   Musculoskeletal: Negative.    Neurological: Negative.    Psychiatric/Behavioral: Negative.          PHYSICAL EXAM:  Physical Exam  Constitutional:       Appearance: Normal appearance. She is normal weight.   Eyes:      Extraocular Movements: Extraocular movements intact.   Cardiovascular:      Rate and Rhythm: Normal rate.      Pulses: Normal pulses.   Pulmonary:      " Effort: Pulmonary effort is normal.   Musculoskeletal:         General: Normal range of motion.      Cervical back: Normal range of motion.   Skin:     General: Skin is warm and dry.   Neurological:      Mental Status: She is alert and oriented to person, place, and time.   Psychiatric:         Mood and Affect: Mood normal.         Behavior: Behavior normal.         Thought Content: Thought content normal.         Judgment: Judgment normal.        Assessment:  Alana was seen today for vaginal bleeding.    Diagnoses and all orders for this visit:    Vaginal discharge  -     Bacterial Vaginosis; Future  -     Chlamydia/GC, PCR; Future  -     Bacterial Vaginosis  -     Chlamydia/GC, PCR    Encounter for assessment of STD exposure  -     Bacterial Vaginosis; Future  -     Chlamydia/GC, PCR; Future  -     Bacterial Vaginosis  -     Chlamydia/GC, PCR    BMI 25.0-25.9,adult          ICD-10-CM ICD-9-CM    1. Vaginal discharge  N89.8 623.5 Bacterial Vaginosis      Chlamydia/GC, PCR      Bacterial Vaginosis      Chlamydia/GC, PCR      2. Encounter for assessment of STD exposure  Z76.89 V72.85 Bacterial Vaginosis      Chlamydia/GC, PCR      Bacterial Vaginosis      Chlamydia/GC, PCR      3. BMI 25.0-25.9,adult  Z68.25 V85.21           Plan:  Affirm swab self collected  Urine send for GC/CT testing  Will call or send My Chart message after results reviewed  Encouraged use of condoms with each sexual encounter to decrease STD exposure risk    Follow up for care prn,  repeat depo as scheduled, due for Pap.

## 2023-06-27 ENCOUNTER — PATIENT MESSAGE (OUTPATIENT)
Dept: RESEARCH | Facility: HOSPITAL | Age: 28
End: 2023-06-27

## 2023-07-10 ENCOUNTER — OFFICE VISIT (OUTPATIENT)
Dept: OBSTETRICS AND GYNECOLOGY | Facility: CLINIC | Age: 28
End: 2023-07-10
Payer: MEDICAID

## 2023-07-10 VITALS
OXYGEN SATURATION: 99 % | HEIGHT: 65 IN | WEIGHT: 154.19 LBS | HEART RATE: 85 BPM | RESPIRATION RATE: 17 BRPM | TEMPERATURE: 99 F | SYSTOLIC BLOOD PRESSURE: 119 MMHG | DIASTOLIC BLOOD PRESSURE: 85 MMHG | BODY MASS INDEX: 25.69 KG/M2

## 2023-07-10 DIAGNOSIS — F41.1 GAD (GENERALIZED ANXIETY DISORDER): Primary | ICD-10-CM

## 2023-07-10 PROCEDURE — 1159F MED LIST DOCD IN RCRD: CPT | Mod: CPTII,,, | Performed by: ADVANCED PRACTICE MIDWIFE

## 2023-07-10 PROCEDURE — 3074F PR MOST RECENT SYSTOLIC BLOOD PRESSURE < 130 MM HG: ICD-10-PCS | Mod: CPTII,,, | Performed by: ADVANCED PRACTICE MIDWIFE

## 2023-07-10 PROCEDURE — 3079F DIAST BP 80-89 MM HG: CPT | Mod: CPTII,,, | Performed by: ADVANCED PRACTICE MIDWIFE

## 2023-07-10 PROCEDURE — 3008F PR BODY MASS INDEX (BMI) DOCUMENTED: ICD-10-PCS | Mod: CPTII,,, | Performed by: ADVANCED PRACTICE MIDWIFE

## 2023-07-10 PROCEDURE — 3079F PR MOST RECENT DIASTOLIC BLOOD PRESSURE 80-89 MM HG: ICD-10-PCS | Mod: CPTII,,, | Performed by: ADVANCED PRACTICE MIDWIFE

## 2023-07-10 PROCEDURE — 1159F PR MEDICATION LIST DOCUMENTED IN MEDICAL RECORD: ICD-10-PCS | Mod: CPTII,,, | Performed by: ADVANCED PRACTICE MIDWIFE

## 2023-07-10 PROCEDURE — 99213 PR OFFICE/OUTPT VISIT, EST, LEVL III, 20-29 MIN: ICD-10-PCS | Mod: ,,, | Performed by: ADVANCED PRACTICE MIDWIFE

## 2023-07-10 PROCEDURE — 99213 OFFICE O/P EST LOW 20 MIN: CPT | Mod: ,,, | Performed by: ADVANCED PRACTICE MIDWIFE

## 2023-07-10 PROCEDURE — 3074F SYST BP LT 130 MM HG: CPT | Mod: CPTII,,, | Performed by: ADVANCED PRACTICE MIDWIFE

## 2023-07-10 PROCEDURE — 3008F BODY MASS INDEX DOCD: CPT | Mod: CPTII,,, | Performed by: ADVANCED PRACTICE MIDWIFE

## 2023-07-10 RX ORDER — SERTRALINE HYDROCHLORIDE 25 MG/1
25 TABLET, FILM COATED ORAL DAILY
Qty: 30 TABLET | Refills: 11 | Status: SHIPPED | OUTPATIENT
Start: 2023-07-10 | End: 2024-03-05 | Stop reason: SDDI

## 2023-07-11 ENCOUNTER — PATIENT MESSAGE (OUTPATIENT)
Dept: RESEARCH | Facility: HOSPITAL | Age: 28
End: 2023-07-11

## 2023-07-12 PROBLEM — F41.1 GAD (GENERALIZED ANXIETY DISORDER): Status: ACTIVE | Noted: 2023-07-12

## 2023-07-12 NOTE — PROGRESS NOTES
"Patient ID:  Alana Hdez is a 28 y.o. female      Chief Complaint:   Chief Complaint   Patient presents with    Anxiety     Patients states she is getting overwhelmed a lot and leads to crying        HPI:  Alana is in with c/o increasing anxiety. Reports has been feeling overwhelmed with being a single parent and work. Cries frequently. Because teary during visit. Was told by her supervisor to seek help. Denies thoughts of self harm or harming others. Discussed and agreed to try low dose SSRI with understanding that referral to PMHNP needed for management. Agrees to go to [facility].   LMP: Patient's last menstrual period was 2023.   Sexually active:  yes  Contraceptive: depo      Past Medical History:   Diagnosis Date    ANGELINE (generalized anxiety disorder) 2023     History reviewed. No pertinent surgical history.    OB History          1    Para   1    Term   1            AB        Living   1         SAB        IAB        Ectopic        Multiple        Live Births   1                 /85 (BP Location: Left arm)   Pulse 85   Temp 98.9 °F (37.2 °C)   Resp 17   Ht 5' 5" (1.651 m)   Wt 69.9 kg (154 lb 3.2 oz)   LMP 2023   SpO2 99%   BMI 25.66 kg/m²   Wt Readings from Last 3 Encounters:   07/10/23 69.9 kg (154 lb 3.2 oz)   23 68.8 kg (151 lb 9.6 oz)   23 69.9 kg (154 lb 3.2 oz)          ROS:  Review of Systems   Constitutional: Negative.    Eyes: Negative.    Respiratory: Negative.     Cardiovascular: Negative.    Gastrointestinal: Negative.    Genitourinary: Negative.    Musculoskeletal: Negative.    Neurological: Negative.    Psychiatric/Behavioral:  The patient is nervous/anxious.         PHYSICAL EXAM:  Physical Exam  Constitutional:       Appearance: Normal appearance. She is normal weight.   Eyes:      Extraocular Movements: Extraocular movements intact.   Cardiovascular:      Rate and Rhythm: Normal rate.      Pulses: Normal pulses. "   Pulmonary:      Effort: Pulmonary effort is normal.   Genitourinary:     General: Normal vulva.   Musculoskeletal:         General: Normal range of motion.      Cervical back: Normal range of motion.   Skin:     General: Skin is warm and dry.   Neurological:      Mental Status: She is alert and oriented to person, place, and time.   Psychiatric:         Mood and Affect: Mood normal.         Behavior: Behavior normal.         Thought Content: Thought content normal.         Judgment: Judgment normal.        Assessment:  Alana was seen today for anxiety.    Diagnoses and all orders for this visit:    ANGELINE (generalized anxiety disorder)  -     sertraline (ZOLOFT) 25 MG tablet; Take 1 tablet (25 mg total) by mouth once daily.    BMI 25.0-25.9,adult          ICD-10-CM ICD-9-CM    1. ANGELINE (generalized anxiety disorder)  F41.1 300.02 sertraline (ZOLOFT) 25 MG tablet      2. BMI 25.0-25.9,adult  Z68.25 V85.21           Plan:  Discussed anxiety management with low dose SSRI  Rx sent for Zoloft 25 mg PO daily  Encouraged to stop med if symptoms worsen or develop suicidal thoughts  Will send referral info to [facility] for [facility] care provider/therapist, explained she would receive a call for appointment scheduling    Follow up for care prn.

## 2023-12-22 ENCOUNTER — OFFICE VISIT (OUTPATIENT)
Dept: OBSTETRICS AND GYNECOLOGY | Facility: CLINIC | Age: 28
End: 2023-12-22
Payer: MEDICAID

## 2023-12-22 VITALS
RESPIRATION RATE: 17 BRPM | WEIGHT: 153 LBS | BODY MASS INDEX: 25.49 KG/M2 | DIASTOLIC BLOOD PRESSURE: 73 MMHG | HEART RATE: 101 BPM | OXYGEN SATURATION: 99 % | SYSTOLIC BLOOD PRESSURE: 115 MMHG | HEIGHT: 65 IN

## 2023-12-22 DIAGNOSIS — N91.2 AMENORRHEA: ICD-10-CM

## 2023-12-22 DIAGNOSIS — Z32.02 URINE PREGNANCY TEST NEGATIVE: ICD-10-CM

## 2023-12-22 DIAGNOSIS — N91.2 AMENORRHEA DUE TO DEPO PROVERA: Primary | ICD-10-CM

## 2023-12-22 LAB
B-HCG UR QL: NEGATIVE
CTP QC/QA: YES

## 2023-12-22 PROCEDURE — 99213 OFFICE O/P EST LOW 20 MIN: CPT | Mod: ,,, | Performed by: ADVANCED PRACTICE MIDWIFE

## 2023-12-22 PROCEDURE — 99213 PR OFFICE/OUTPT VISIT, EST, LEVL III, 20-29 MIN: ICD-10-PCS | Mod: ,,, | Performed by: ADVANCED PRACTICE MIDWIFE

## 2023-12-22 PROCEDURE — 81025 URINE PREGNANCY TEST: CPT | Mod: RHCUB | Performed by: ADVANCED PRACTICE MIDWIFE

## 2023-12-22 RX ORDER — MEDROXYPROGESTERONE ACETATE 10 MG/1
10 TABLET ORAL DAILY
Qty: 10 TABLET | Refills: 0 | Status: SHIPPED | OUTPATIENT
Start: 2023-12-22 | End: 2024-03-05 | Stop reason: SDDI

## 2023-12-22 NOTE — PROGRESS NOTES
"Patient ID:  Alana Hdez is a 28 y.o. female      Chief Complaint:   Chief Complaint   Patient presents with    Amenorrhea     Patient hasn't had a cycle after stopping the shot in May        HPI:  Alana is in with c/o amenorrhea. Was on depo injections, last dose given in May. Had BTB when she was on depo. Became concerned when bleeding stopped after stopping injections. Explained amenorrhea is common with depo injections, may take 12-18 months for fertility/regular cycles to return. Reports negative home UPT. Discussed option of provera dosing for cycle initiation, desires provera.   LMP: No LMP recorded (lmp unknown).   Sexually active:  yes  Contraceptive: none      Past Medical History:   Diagnosis Date    ANGELINE (generalized anxiety disorder) 2023     No past surgical history on file.    OB History          1    Para   1    Term   1            AB        Living   1         SAB        IAB        Ectopic        Multiple        Live Births   1                 /73 (BP Location: Right arm, Patient Position: Sitting)   Pulse 101   Resp 17   Ht 5' 5" (1.651 m)   Wt 69.4 kg (153 lb)   LMP  (LMP Unknown)   SpO2 99%   BMI 25.46 kg/m²   Wt Readings from Last 3 Encounters:   23 69.4 kg (153 lb)   07/10/23 69.9 kg (154 lb 3.2 oz)   23 68.8 kg (151 lb 9.6 oz)          ROS:  Review of Systems   Constitutional: Negative.    Eyes: Negative.    Respiratory: Negative.     Cardiovascular: Negative.    Gastrointestinal: Negative.    Genitourinary:  Positive for menstrual problem.   Musculoskeletal: Negative.    Neurological: Negative.    Psychiatric/Behavioral: Negative.            PHYSICAL EXAM:  Physical Exam  Constitutional:       Appearance: Normal appearance. She is normal weight.   Eyes:      Extraocular Movements: Extraocular movements intact.   Cardiovascular:      Rate and Rhythm: Normal rate.      Pulses: Normal pulses.   Pulmonary:      Effort: Pulmonary effort is " normal.   Musculoskeletal:         General: Normal range of motion.      Cervical back: Normal range of motion.   Skin:     General: Skin is warm and dry.   Neurological:      Mental Status: She is alert and oriented to person, place, and time.   Psychiatric:         Mood and Affect: Mood normal.         Behavior: Behavior normal.         Thought Content: Thought content normal.         Judgment: Judgment normal.          Assessment:  Alana was seen today for amenorrhea.    Diagnoses and all orders for this visit:    Amenorrhea due to Depo Provera  -     medroxyPROGESTERone (PROVERA) 10 MG tablet; Take 1 tablet (10 mg total) by mouth once daily.    Amenorrhea  -     POCT urine pregnancy    Urine pregnancy test negative    BMI 25.0-25.9,adult          ICD-10-CM ICD-9-CM    1. Amenorrhea due to Depo Provera  N91.2 626.0 medroxyPROGESTERone (PROVERA) 10 MG tablet     E980.4       2. Amenorrhea  N91.2 626.0 POCT urine pregnancy      3. Urine pregnancy test negative  Z32.02 V72.41       4. BMI 25.0-25.9,adult  Z68.25 V85.21           Plan:  UPT negative  Discussed amenorrhea due to depo injections, explained may take 12-18 months for return of fertility/regular cycles  Provera dosing rx sent to initiate cycle    Follow up for care as needed.

## 2024-03-05 ENCOUNTER — OFFICE VISIT (OUTPATIENT)
Dept: OBSTETRICS AND GYNECOLOGY | Facility: CLINIC | Age: 29
End: 2024-03-05
Payer: MEDICAID

## 2024-03-05 VITALS
TEMPERATURE: 98 F | DIASTOLIC BLOOD PRESSURE: 77 MMHG | WEIGHT: 156 LBS | HEIGHT: 65 IN | BODY MASS INDEX: 25.99 KG/M2 | RESPIRATION RATE: 17 BRPM | SYSTOLIC BLOOD PRESSURE: 110 MMHG | OXYGEN SATURATION: 99 % | HEART RATE: 94 BPM

## 2024-03-05 DIAGNOSIS — Z32.01 POSITIVE URINE PREGNANCY TEST: ICD-10-CM

## 2024-03-05 DIAGNOSIS — N91.1 SECONDARY AMENORRHEA: Primary | ICD-10-CM

## 2024-03-05 LAB
B-HCG UR QL: POSITIVE
CTP QC/QA: YES

## 2024-03-05 PROCEDURE — 99212 OFFICE O/P EST SF 10 MIN: CPT | Mod: ,,, | Performed by: ADVANCED PRACTICE MIDWIFE

## 2024-03-05 PROCEDURE — 3078F DIAST BP <80 MM HG: CPT | Mod: CPTII,,, | Performed by: ADVANCED PRACTICE MIDWIFE

## 2024-03-05 PROCEDURE — 1159F MED LIST DOCD IN RCRD: CPT | Mod: CPTII,,, | Performed by: ADVANCED PRACTICE MIDWIFE

## 2024-03-05 PROCEDURE — 3008F BODY MASS INDEX DOCD: CPT | Mod: CPTII,,, | Performed by: ADVANCED PRACTICE MIDWIFE

## 2024-03-05 PROCEDURE — 81025 URINE PREGNANCY TEST: CPT | Mod: RHCUB | Performed by: ADVANCED PRACTICE MIDWIFE

## 2024-03-05 PROCEDURE — 3074F SYST BP LT 130 MM HG: CPT | Mod: CPTII,,, | Performed by: ADVANCED PRACTICE MIDWIFE

## 2024-03-05 NOTE — PROGRESS NOTES
Subjective:      Patient ID: Alana Hdez is a 29 y.o. female.    Chief Complaint:  Possible Pregnancy (Had 6 at home positive pregnancy tests)      History of Present Illness  Ms Hdez had six positive home pregnancy test.  She hasn't taken Depo since last August.  UPT here is positive.  She is a  and her son is six years old.  She did not expect to get pregnant so soon.  Her partner is here with her today.  LMP 2024, EGA 5 weeks, ANABELLA 24.    Missed Menses/ Possible Pregnancy  Patient complains of amenorrhea. She believes she could be pregnant. Patient is ambivalent about pregnancy. Sexual Activity: single partner, contraception: none. Current symptoms also include: breast tenderness and fatigue. Last period was normal.     Patient's last menstrual period was 2024.     GYN & OB History  Patient's last menstrual period was 2024.   Date of Last Pap: 2020    OB History    Para Term  AB Living   1 1 1     1   SAB IAB Ectopic Multiple Live Births           1      # Outcome Date GA Lbr Chilo/2nd Weight Sex Delivery Anes PTL Lv   1 Term 10/18/17   4.309 kg (9 lb 8 oz) M Vag-Spont OTHER  EULA       Review of Systems  Review of Systems   Constitutional:  Positive for fatigue.   HENT: Negative.     Respiratory: Negative.     Cardiovascular: Negative.    Gastrointestinal: Negative.    Genitourinary:  Positive for menstrual problem.   Musculoskeletal: Negative.    Skin: Negative.    Neurological: Negative.    Psychiatric/Behavioral: Negative.          Objective:    Physical Exam   Constitutional: She is oriented to person, place, and time. She appears well-developed and well-nourished.   HENT:   Nose: Nose normal.   Cardiovascular: Normal rate.   Pulmonary/Chest: Effort normal. No respiratory distress.   Abdominal: Soft.   Neurological: She is alert and oriented to person, place, and time.   Skin: Skin is warm and dry.   Psychiatric: She has a normal mood and affect. Her  behavior is normal.   Nursing note and vitals reviewed.       Assessment:     1. Amenorrhea    2. Positive urine pregnancy test            Plan:   Rx for prenatal vitamins  Reviewed changes in early pregnancy and danger signs  Info on healthy diet during pregnancy  Referral to Marissa Hutchins CNM to start prenatal care.

## 2024-03-26 ENCOUNTER — PROCEDURE VISIT (OUTPATIENT)
Dept: OBSTETRICS AND GYNECOLOGY | Facility: CLINIC | Age: 29
End: 2024-03-26
Payer: MEDICAID

## 2024-03-26 ENCOUNTER — OFFICE VISIT (OUTPATIENT)
Dept: OBSTETRICS AND GYNECOLOGY | Facility: CLINIC | Age: 29
End: 2024-03-26
Payer: MEDICAID

## 2024-03-26 VITALS
RESPIRATION RATE: 18 BRPM | HEART RATE: 87 BPM | SYSTOLIC BLOOD PRESSURE: 105 MMHG | TEMPERATURE: 98 F | BODY MASS INDEX: 26.13 KG/M2 | WEIGHT: 156.81 LBS | OXYGEN SATURATION: 98 % | HEIGHT: 65 IN | DIASTOLIC BLOOD PRESSURE: 72 MMHG

## 2024-03-26 DIAGNOSIS — N91.1 SECONDARY AMENORRHEA: Primary | ICD-10-CM

## 2024-03-26 DIAGNOSIS — Z36.89 ENCOUNTER FOR ULTRASOUND TO ASSESS FETAL GROWTH: ICD-10-CM

## 2024-03-26 DIAGNOSIS — R11.0 NAUSEA: ICD-10-CM

## 2024-03-26 PROCEDURE — 99204 OFFICE O/P NEW MOD 45 MIN: CPT | Mod: ,,, | Performed by: ADVANCED PRACTICE MIDWIFE

## 2024-03-26 PROCEDURE — 3008F BODY MASS INDEX DOCD: CPT | Mod: CPTII,,, | Performed by: ADVANCED PRACTICE MIDWIFE

## 2024-03-26 PROCEDURE — 99499 UNLISTED E&M SERVICE: CPT | Mod: ,,, | Performed by: OBSTETRICS & GYNECOLOGY

## 2024-03-26 PROCEDURE — 76801 OB US < 14 WKS SINGLE FETUS: CPT | Mod: ,,, | Performed by: OBSTETRICS & GYNECOLOGY

## 2024-03-26 PROCEDURE — 1159F MED LIST DOCD IN RCRD: CPT | Mod: CPTII,,, | Performed by: ADVANCED PRACTICE MIDWIFE

## 2024-03-26 PROCEDURE — 3078F DIAST BP <80 MM HG: CPT | Mod: CPTII,,, | Performed by: ADVANCED PRACTICE MIDWIFE

## 2024-03-26 PROCEDURE — 3074F SYST BP LT 130 MM HG: CPT | Mod: CPTII,,, | Performed by: ADVANCED PRACTICE MIDWIFE

## 2024-03-26 RX ORDER — ERGOCALCIFEROL 1.25 MG/1
50000 CAPSULE ORAL
Qty: 5 CAPSULE | Refills: 3 | Status: SHIPPED | OUTPATIENT
Start: 2024-03-26 | End: 2024-04-24

## 2024-03-26 RX ORDER — ONDANSETRON 8 MG/1
8 TABLET, ORALLY DISINTEGRATING ORAL 3 TIMES DAILY
Qty: 30 TABLET | Refills: 2 | Status: SHIPPED | OUTPATIENT
Start: 2024-03-26

## 2024-03-26 NOTE — PROGRESS NOTES
Missed Menses/ Possible Pregnancy  Patient complains of amenorrhea. She believes she could be pregnant. Pregnancy is desired. Sexual Activity: has sex with males. Current symptoms also include: breast tenderness, frequent urination, nausea, and positive home pregnancy test. Last period was normal.     Patient's last menstrual period was 2024 (exact date).     Review of patient's allergies indicates:  No Known Allergies    Past Medical History:   Diagnosis Date    ANGELINE (generalized anxiety disorder) 2023     History reviewed. No pertinent surgical history.  OB History          2    Para   1    Term   1            AB        Living   1         SAB        IAB        Ectopic        Multiple        Live Births   1               History reviewed. No pertinent family history.  Social History     Tobacco Use    Smoking status: Never     Passive exposure: Never    Smokeless tobacco: Never   Substance Use Topics    Alcohol use: Not Currently    Drug use: Never     Genetic History reviewed    Current Outpatient Medications   Medication Sig    PNV,calcium 72-iron-folic acid (PRENATAL VITAMIN PLUS LOW IRON) 27 mg iron- 1 mg Tab Take 1 tablet (1 each total) by mouth once daily.    ergocalciferol (VITAMIN D2) 50,000 unit Cap Take 1 capsule (50,000 Units total) by mouth every 7 days. for 5 doses    ondansetron (ZOFRAN-ODT) 8 MG TbDL Take 1 tablet (8 mg total) by mouth 3 (three) times daily.     No current facility-administered medications for this visit.       OB History    Para Term  AB Living   2 1 1     1   SAB IAB Ectopic Multiple Live Births           1      # Outcome Date GA Lbr Chilo/2nd Weight Sex Delivery Anes PTL Lv   2 Current            1 Term 10/18/17 40w0d  4.309 kg (9 lb 8 oz) M Vag-Spont OTHER N EULA        Review of Systems  ROS:  GENERAL: No fever, chills, fatigability or weight loss.  VULVAR: No pain, no lesions and no itching.  VAGINAL: No relaxation, no itching, no discharge,  no abnormal bleeding and no lesions.  ABDOMEN: No abdominal pain. Denies nausea. Denies vomiting. No diarrhea. No constipation  BREAST: Denies pain. No lumps. No discharge.  URINARY: No incontinence, no nocturia, no frequency and no dysuria.  CARDIOVASCULAR: No chest pain. No shortness of breath. No leg cramps.  NEUROLOGICAL: no headaches. No vision changes.    Objective:     PE:  AFFECT: Calm, alert and oriented X 3. Interactive during exam  GENERAL: Appears well-nourished, well-developed, in no acute distress.  HEAD: Normocephalic, atruamatic  TEETH: Good dentition.  THYROID: No thyromegally   BREASTS: No masses, skin changes, nipple discharge or adenopathy bilaterally.  SKIN: Normal for race, warm, & dry. No lesions or rashes.  LUNGS: Easy and unlabored, clear to auscultation bilaterally.  HEART: Regular rate and rhythm   EXTREMITIES: No cyanosis, clubbing or edema. No calf tenderness.    Pregnancy test: positive  EDC: 11/1/2024  OB US today: EDC 8 weeks      Assessment:     Alana was seen today for establish pregnancy.    Diagnoses and all orders for this visit:    Secondary amenorrhea  -     ergocalciferol (VITAMIN D2) 50,000 unit Cap; Take 1 capsule (50,000 Units total) by mouth every 7 days. for 5 doses    Encounter for ultrasound to assess fetal growth  -     US OB/GYN In Clinic Procedure (Non Viewpoint)- Today    Nausea  -     ondansetron (ZOFRAN-ODT) 8 MG TbDL; Take 1 tablet (8 mg total) by mouth 3 (three) times daily.        ICD-10-CM ICD-9-CM    1. Secondary amenorrhea  N91.1 626.0 ergocalciferol (VITAMIN D2) 50,000 unit Cap      2. Encounter for ultrasound to assess fetal growth  Z36.89 V28.3 US OB/GYN In Clinic Procedure (Non Viewpoint)- Today      3. Nausea  R11.0 787.02 ondansetron (ZOFRAN-ODT) 8 MG TbDL           Plan:     Oriented to practice  Bleeding precautions discussed. If noted, follow up a the emergency room or clinic if scheduled for evaluation.  Counseled to avoid cat litter  boxes.  Avoid gardening without gloves  Avoid half cooked, rare, or raw meats.  Avoid foods high in nitrites such as cold cuts- heat up any deli meats.  Seafood no more than 3 times per week or may eat large fish like tuna no more than once a week.  Avoid soft unpasteurized cheeses.  I recommend a PNV daily.    She should avoid ibuprofen, aleve, advil, BC powders.  Pink OB bag with prenatal book and information provided  Weight gain in pregnancy discussed  Vitamin D daily  Prenatal vitamins daily  Verbalized understanding to all instructions and information  Questions answered to desired level of satisfaction      Follow up in about 1 week (around 4/2/2024).    Marissa Hutchins, ISMAEL, CNM, WHNP-BC

## 2024-03-26 NOTE — LETTER
March 26, 2024    Alana Hdez  [address] [facility] [address]             [facility]  [address] [facility] [address]  Phone: [phone]  Fax: [phone] 03/26/2024     Alana Hdez   1995       Alana Hdez is currently pregnant and her due date is Estimated Date of Delivery: 11/4/24.    Sincerely,          Annie Day, CHAUN for Marissa Hutchins, DNP, CNM, WHNP-BC  Doctor of Nursing Practice, Certified Nurse Midwife, Women's Health Nurse Practitioner

## 2024-04-02 ENCOUNTER — OFFICE VISIT (OUTPATIENT)
Dept: OBSTETRICS AND GYNECOLOGY | Facility: CLINIC | Age: 29
End: 2024-04-02
Payer: MEDICAID

## 2024-04-02 VITALS
BODY MASS INDEX: 25.63 KG/M2 | HEIGHT: 65 IN | OXYGEN SATURATION: 99 % | HEART RATE: 97 BPM | TEMPERATURE: 98 F | DIASTOLIC BLOOD PRESSURE: 72 MMHG | SYSTOLIC BLOOD PRESSURE: 108 MMHG | WEIGHT: 153.81 LBS

## 2024-04-02 DIAGNOSIS — Z01.419 WELL WOMAN EXAM WITH ROUTINE GYNECOLOGICAL EXAM: Primary | ICD-10-CM

## 2024-04-02 DIAGNOSIS — Z72.51 HIGH RISK HETEROSEXUAL BEHAVIOR: ICD-10-CM

## 2024-04-02 DIAGNOSIS — Z12.4 ENCOUNTER FOR SCREENING FOR MALIGNANT NEOPLASM OF CERVIX: ICD-10-CM

## 2024-04-02 DIAGNOSIS — Z11.3 SCREEN FOR SEXUALLY TRANSMITTED DISEASES: ICD-10-CM

## 2024-04-02 LAB
CANDIDA SPECIES: POSITIVE
GARDNERELLA: POSITIVE
TRICHOMONAS: NEGATIVE

## 2024-04-02 PROCEDURE — 1159F MED LIST DOCD IN RCRD: CPT | Mod: CPTII,,, | Performed by: ADVANCED PRACTICE MIDWIFE

## 2024-04-02 PROCEDURE — 3008F BODY MASS INDEX DOCD: CPT | Mod: CPTII,,, | Performed by: ADVANCED PRACTICE MIDWIFE

## 2024-04-02 PROCEDURE — 87510 GARDNER VAG DNA DIR PROBE: CPT | Mod: ,,, | Performed by: CLINICAL MEDICAL LABORATORY

## 2024-04-02 PROCEDURE — 88141 CYTOPATH C/V INTERPRET: CPT | Mod: ,,, | Performed by: PATHOLOGY

## 2024-04-02 PROCEDURE — 87660 TRICHOMONAS VAGIN DIR PROBE: CPT | Mod: ,,, | Performed by: CLINICAL MEDICAL LABORATORY

## 2024-04-02 PROCEDURE — 87591 N.GONORRHOEAE DNA AMP PROB: CPT | Mod: ,,, | Performed by: CLINICAL MEDICAL LABORATORY

## 2024-04-02 PROCEDURE — 3074F SYST BP LT 130 MM HG: CPT | Mod: CPTII,,, | Performed by: ADVANCED PRACTICE MIDWIFE

## 2024-04-02 PROCEDURE — 87491 CHLMYD TRACH DNA AMP PROBE: CPT | Mod: ,,, | Performed by: CLINICAL MEDICAL LABORATORY

## 2024-04-02 PROCEDURE — 88142 CYTOPATH C/V THIN LAYER: CPT | Mod: TC,GCY | Performed by: ADVANCED PRACTICE MIDWIFE

## 2024-04-02 PROCEDURE — 99395 PREV VISIT EST AGE 18-39: CPT | Mod: ,,, | Performed by: ADVANCED PRACTICE MIDWIFE

## 2024-04-02 PROCEDURE — 87480 CANDIDA DNA DIR PROBE: CPT | Mod: ,,, | Performed by: CLINICAL MEDICAL LABORATORY

## 2024-04-02 NOTE — PROGRESS NOTES
"CC: Here for pap smear, annual exam    Alana Hdez is a 29 y.o. female  presents for well woman exam.  LMP: Patient's last menstrual period was 2024 (exact date).. Menses are: normal. Denies any further issues, problems, or complaints.    Last mammogram: n/a  Colonoscopy: n/a    Past Medical History:   Diagnosis Date    ANGELINE (generalized anxiety disorder) 2023     History reviewed. No pertinent surgical history.  Social History     Socioeconomic History    Marital status: Single   Tobacco Use    Smoking status: Never     Passive exposure: Never    Smokeless tobacco: Never   Substance and Sexual Activity    Alcohol use: Not Currently    Drug use: Never    Sexual activity: Yes     Partners: Male     History reviewed. No pertinent family history.  OB History          2    Para   1    Term   1            AB        Living   1         SAB        IAB        Ectopic        Multiple        Live Births   1                 /72   Pulse 97   Temp 98.3 °F (36.8 °C)   Ht 5' 5" (1.651 m)   Wt 69.8 kg (153 lb 12.8 oz)   LMP 2024 (Exact Date)   SpO2 99%   BMI 25.59 kg/m²       ROS:  GENERAL: Denies weight gain or weight loss. Feeling well overall.   SKIN: Denies rash or lesions.   HEAD: Denies head injury or headache.   NODES: Denies enlarged lymph nodes.   CHEST: Denies chest pain or shortness of breath.   CARDIOVASCULAR: Denies palpitations or left sided chest pain.   ABDOMEN: No abdominal pain, constipation, diarrhea, nausea, vomiting or rectal bleeding.   URINARY: No frequency, dysuria, hematuria, or burning on urination.  REPRODUCTIVE: See HPI.   BREASTS: The patient performs breast self-examination and denies pain, lumps, or nipple discharge.   HEMATOLOGIC: No easy bruisability or excessive bleeding.   MUSCULOSKELETAL: Denies joint pain or swelling.   NEUROLOGIC: Denies syncope or weakness.   PSYCHIATRIC: Denies depression, anxiety or mood swings.    PHYSICAL " EXAM:  APPEARANCE: Well nourished, well developed, in no acute distress.  AFFECT: WNL, alert and oriented x 3  SKIN: No acne or hirsutism  NECK: Neck symmetric without masses or thyromegaly  NODES: No inguinal, cervical, axillary, or femoral lymph node enlargement  CHEST: Good respiratory effect  ABDOMEN: Soft.  No tenderness or masses.  No hepatosplenomegaly.  No hernias.  BREASTS: Symmetrical, no skin changes or visible lesions.  No palpable masses, nipple discharge bilaterally.  PELVIC: Normal external genitalia without lesions.  Normal hair distribution.  Adequate perineal body, normal urethral meatus.  Vagina moist and well rugated without lesions, thick white discharge.  Cervix pink, without lesions, tenderness with thick white discharge.  No significant cystocele or rectocele.  Bimanual exam shows uterus to be slightly enlarged size, regular, mobile and nontender.  Adnexa without masses or tenderness.    EXTREMITIES: No edema.    Well woman exam with routine gynecological exam  -     ThinPrep Pap Test; Future; Expected date: 04/02/2024    Encounter for screening for malignant neoplasm of cervix  -     ThinPrep Pap Test; Future; Expected date: 04/02/2024    Screen for sexually transmitted diseases  -     Bacterial Vaginosis; Future; Expected date: 04/02/2024  -     Chlamydia/GC, PCR; Future; Expected date: 04/02/2024    High risk heterosexual behavior  -     Bacterial Vaginosis; Future; Expected date: 04/02/2024  -     Chlamydia/GC, PCR; Future; Expected date: 04/02/2024        ICD-10-CM ICD-9-CM    1. Well woman exam with routine gynecological exam  Z01.419 V72.31 ThinPrep Pap Test      2. Encounter for screening for malignant neoplasm of cervix  Z12.4 V76.2 ThinPrep Pap Test      3. Screen for sexually transmitted diseases  Z11.3 V74.5 Bacterial Vaginosis      Chlamydia/GC, PCR      4. High risk heterosexual behavior  Z72.51 V69.2 Bacterial Vaginosis      Chlamydia/GC, PCR          Patient was counseled  today on A.C.S. Pap guidelines and recommendations for yearly pelvic exams, mammograms and monthly self breast exams; to see her PCP for other health maintenance.   Exercise regimen encouraged  Healthy food choices encouraged  Multivitamins daily  Vitamin D daily  Questions answered to desired level of satisfaction  Verbalized understanding to all information and instructions    Follow up in about 1 year (around 4/2/2025), or if symptoms worsen or fail to improve, for Annual Exam in 1 yr, f/u in 1 weeks for NOB visit.

## 2024-04-03 DIAGNOSIS — B37.31 VAGINAL CANDIDA: Primary | ICD-10-CM

## 2024-04-03 LAB
CHLAMYDIA BY PCR: NEGATIVE
N. GONORRHOEAE (GC) BY PCR: NEGATIVE

## 2024-04-03 RX ORDER — TERCONAZOLE 4 MG/G
1 CREAM VAGINAL NIGHTLY
Qty: 45 G | Refills: 0 | Status: SHIPPED | OUTPATIENT
Start: 2024-04-03

## 2024-04-08 ENCOUNTER — ROUTINE PRENATAL (OUTPATIENT)
Dept: OBSTETRICS AND GYNECOLOGY | Facility: CLINIC | Age: 29
End: 2024-04-08
Payer: MEDICAID

## 2024-04-08 VITALS
BODY MASS INDEX: 25.86 KG/M2 | SYSTOLIC BLOOD PRESSURE: 106 MMHG | DIASTOLIC BLOOD PRESSURE: 72 MMHG | HEART RATE: 90 BPM | WEIGHT: 155.38 LBS

## 2024-04-08 DIAGNOSIS — E55.9 VITAMIN D DEFICIENCY, UNSPECIFIED: ICD-10-CM

## 2024-04-08 DIAGNOSIS — Z72.51 HIGH RISK HETEROSEXUAL BEHAVIOR: ICD-10-CM

## 2024-04-08 DIAGNOSIS — Z32.01 POSITIVE URINE PREGNANCY TEST: ICD-10-CM

## 2024-04-08 DIAGNOSIS — Z36.89 ENCOUNTER FOR OTHER SPECIFIED ANTENATAL SCREENING: ICD-10-CM

## 2024-04-08 DIAGNOSIS — Z34.80 ENCOUNTER FOR SUPERVISION OF OTHER NORMAL PREGNANCY, UNSPECIFIED TRIMESTER: Primary | ICD-10-CM

## 2024-04-08 DIAGNOSIS — Z11.3 SCREEN FOR SEXUALLY TRANSMITTED DISEASES: ICD-10-CM

## 2024-04-08 DIAGNOSIS — Z11.4 SCREENING FOR HIV (HUMAN IMMUNODEFICIENCY VIRUS): ICD-10-CM

## 2024-04-08 DIAGNOSIS — Z3A.10 10 WEEKS GESTATION OF PREGNANCY: ICD-10-CM

## 2024-04-08 LAB
25(OH)D3 SERPL-MCNC: 43.7 NG/ML
BASOPHILS # BLD AUTO: 0.05 K/UL (ref 0–0.2)
BASOPHILS NFR BLD AUTO: 0.7 % (ref 0–1)
BILIRUB SERPL-MCNC: NORMAL MG/DL
BLOOD, POC UA: NORMAL
DIFFERENTIAL METHOD BLD: ABNORMAL
EOSINOPHIL # BLD AUTO: 0.12 K/UL (ref 0–0.5)
EOSINOPHIL NFR BLD AUTO: 1.7 % (ref 1–4)
ERYTHROCYTE [DISTWIDTH] IN BLOOD BY AUTOMATED COUNT: 13.4 % (ref 11.5–14.5)
EST. AVERAGE GLUCOSE BLD GHB EST-MCNC: 85 MG/DL
GH SERPL-MCNC: ABNORMAL NG/ML
GLUCOSE UR QL STRIP: NORMAL
HBA1C MFR BLD HPLC: 4.6 % (ref 4.5–6.6)
HBV SURFACE AG SERPL QL IA: NORMAL
HCT VFR BLD AUTO: 37.9 % (ref 38–47)
HGB BLD-MCNC: 12.1 G/DL (ref 12–16)
HIV 1+O+2 AB SERPL QL: NORMAL
IMM GRANULOCYTES # BLD AUTO: 0.02 K/UL (ref 0–0.04)
IMM GRANULOCYTES NFR BLD: 0.3 % (ref 0–0.4)
INDIRECT COOMBS: NORMAL
INSULIN SERPL-ACNC: ABNORMAL U[IU]/ML
KETONES UR QL STRIP: NORMAL
LAB AP CLINICAL INFORMATION: ABNORMAL
LAB AP GYN INTERPRETATION: ABNORMAL
LAB AP PAP DISCLAIMER COMMENTS: ABNORMAL
LEUKOCYTE ESTERASE URINE, POC: NORMAL
LYMPHOCYTES # BLD AUTO: 1.4 K/UL (ref 1–4.8)
LYMPHOCYTES NFR BLD AUTO: 19.6 % (ref 27–41)
MCH RBC QN AUTO: 26 PG (ref 27–31)
MCHC RBC AUTO-ENTMCNC: 31.9 G/DL (ref 32–36)
MCV RBC AUTO: 81.5 FL (ref 80–96)
MONOCYTES # BLD AUTO: 0.41 K/UL (ref 0–0.8)
MONOCYTES NFR BLD AUTO: 5.8 % (ref 2–6)
MPC BLD CALC-MCNC: 12.6 FL (ref 9.4–12.4)
NEUTROPHILS # BLD AUTO: 5.13 K/UL (ref 1.8–7.7)
NEUTROPHILS NFR BLD AUTO: 71.9 % (ref 53–65)
NITRITE, POC UA: NORMAL
NRBC # BLD AUTO: 0 X10E3/UL
NRBC, AUTO (.00): 0 %
PH, POC UA: 7
PLATELET # BLD AUTO: 216 K/UL (ref 150–400)
PROTEIN, POC: NORMAL
RBC # BLD AUTO: 4.65 M/UL (ref 4.2–5.4)
RENIN PLAS-CCNC: ABNORMAL NG/ML/H
RH BLD: NORMAL
RUBV IGG SER-ACNC: NORMAL [IU]/ML
SPECIFIC GRAVITY, POC UA: 1.02
SPECIMEN OUTDATE: NORMAL
SYPHILIS AB INTERPRETATION: NORMAL
UROBILINOGEN, POC UA: 1
WBC # BLD AUTO: 7.13 K/UL (ref 4.5–11)

## 2024-04-08 PROCEDURE — 87389 HIV-1 AG W/HIV-1&-2 AB AG IA: CPT | Mod: ,,, | Performed by: CLINICAL MEDICAL LABORATORY

## 2024-04-08 PROCEDURE — 83036 HEMOGLOBIN GLYCOSYLATED A1C: CPT | Mod: ,,, | Performed by: CLINICAL MEDICAL LABORATORY

## 2024-04-08 PROCEDURE — 85660 RBC SICKLE CELL TEST: CPT | Mod: ,,, | Performed by: CLINICAL MEDICAL LABORATORY

## 2024-04-08 PROCEDURE — 86901 BLOOD TYPING SEROLOGIC RH(D): CPT | Mod: ,,, | Performed by: CLINICAL MEDICAL LABORATORY

## 2024-04-08 PROCEDURE — 87340 HEPATITIS B SURFACE AG IA: CPT | Mod: ,,, | Performed by: CLINICAL MEDICAL LABORATORY

## 2024-04-08 PROCEDURE — 86762 RUBELLA ANTIBODY: CPT | Mod: ,,, | Performed by: CLINICAL MEDICAL LABORATORY

## 2024-04-08 PROCEDURE — 86850 RBC ANTIBODY SCREEN: CPT | Mod: ,,, | Performed by: CLINICAL MEDICAL LABORATORY

## 2024-04-08 PROCEDURE — 86900 BLOOD TYPING SEROLOGIC ABO: CPT | Mod: ,,, | Performed by: CLINICAL MEDICAL LABORATORY

## 2024-04-08 PROCEDURE — 36415 COLL VENOUS BLD VENIPUNCTURE: CPT | Mod: ,,, | Performed by: ADVANCED PRACTICE MIDWIFE

## 2024-04-08 PROCEDURE — 86780 TREPONEMA PALLIDUM: CPT | Mod: ,,, | Performed by: CLINICAL MEDICAL LABORATORY

## 2024-04-08 PROCEDURE — 85025 COMPLETE CBC W/AUTO DIFF WBC: CPT | Mod: ,,, | Performed by: CLINICAL MEDICAL LABORATORY

## 2024-04-08 PROCEDURE — 99214 OFFICE O/P EST MOD 30 MIN: CPT | Mod: TH,,, | Performed by: ADVANCED PRACTICE MIDWIFE

## 2024-04-08 PROCEDURE — 82306 VITAMIN D 25 HYDROXY: CPT | Mod: ,,, | Performed by: CLINICAL MEDICAL LABORATORY

## 2024-04-08 PROCEDURE — G0481 DRUG TEST DEF 8-14 CLASSES: HCPCS | Mod: ,,, | Performed by: CLINICAL MEDICAL LABORATORY

## 2024-04-08 PROCEDURE — 87086 URINE CULTURE/COLONY COUNT: CPT | Mod: ,,, | Performed by: CLINICAL MEDICAL LABORATORY

## 2024-04-08 NOTE — PROGRESS NOTES
29 y.o. female  at 10w0d   She c/o no problems  Reports no fetal movement or fluttering. Denies any vaginal bleeding, leakage of fluid, cramping, contractions, or pressure.   Total weight gain/weight loss in pregnancy: -0.272 kg (-9.6 oz)     Vitals  BP: 106/72  Pulse: 90  Weight: 70.5 kg (155 lb 6.4 oz)  Prenatal  Fundal Height (cm): 10 cm  Fetal Heart Rate: too early  Movement: Absent  Urine Albumin/Glucose  Urine Albumin: Trace  Urine Glucose: Negative  Edema  LLE Edema: None  RLE Edema: None  Facial: None  Additional Edema?: No    Prenatal Labs:  Lab Results   Component Value Date    LABNGO Negative 2024       A: 10w0d           ICD-10-CM ICD-9-CM    1. Encounter for supervision of other normal pregnancy, unspecified trimester  Z34.80 V22.1 Hemoglobin A1C      Type & Screen      CBC Auto Differential      Rubella Antibody Screen      Urine culture      Sickle Cell Screen      Urine culture      Hemoglobin A1C      Type & Screen      CBC Auto Differential      Rubella Antibody Screen      Sickle Cell Screen      2. Screen for sexually transmitted diseases  Z11.3 V74.5 Hepatitis B Surface Antigen      Treponema Pallidum (Syphillis) Antibody      Hepatitis B Surface Antigen      Treponema Pallidum (Syphillis) Antibody      3. High risk heterosexual behavior  Z72.51 V69.2 Hepatitis B Surface Antigen      HIV 1/2 Ag/Ab (4th Gen)      Treponema Pallidum (Syphillis) Antibody      Hepatitis B Surface Antigen      HIV 1/2 Ag/Ab (4th Gen)      Treponema Pallidum (Syphillis) Antibody      4. Screening for HIV (human immunodeficiency virus)  Z11.4 V73.89 HIV 1/2 Ag/Ab (4th Gen)      HIV 1/2 Ag/Ab (4th Gen)      5. Vitamin D deficiency, unspecified  E55.9 268.9 Vitamin D      Vitamin D      6. Encounter for other specified  screening  Z36.89 V28.9 Drug Screen Definitive 14, Urine      Miscellaneous Test, Sendout Nestor      Drug Screen Definitive 14, Urine      7. 10 weeks gestation of pregnancy  Z3A.10  V22.2 POCT URINALYSIS          P: Bleeding, daily fetal kick counts, and  labor/labor precautions discussed.    The following were addressed during this visit:    1-8 Weeks  - Lifestyle Discussion   - Warning Signs   - Course of Care   - Physiology of Pregnancy   - Nutrition and Supplements   - Domestic Abuse Screen   - HIV Counseling   - Smoking Intervention   - SPAAD/Insurance Verification   - Importance of Exclusive Breastfeeding for First 6 Months   - Continuation of Breastfeeding of Complimentary after intro of solid foods   - Benefits of Breastfeeding     8-12 Weeks  - Review lab tests   - Genetic Counseling (NT/CVS/Amino)   - Influenza IM (for due date  - 3/31)   - Non-pharmacologic Pain Relief Methods for Labor & Birth       Questions answered to desired level of satisfaction  Verbalized understanding to all information and instructions provided.  Follow up in about 4 weeks (around 2024), or if symptoms worsen or fail to improve, for CLEVE visit.    Marissa Hutchins, DNP, CNM, WHNP-BC

## 2024-04-09 LAB — HGB S BLD QL SOLY: NEGATIVE

## 2024-04-10 LAB — UA COMPLETE W REFLEX CULTURE PNL UR: NORMAL

## 2024-04-11 LAB
6-ACETYLMORPHINE, URINE (RUSH): NEGATIVE 10 NG/ML
7-AMINOCLONAZEPAM, URINE (RUSH): NEGATIVE 25 NG/ML
A-HYDROXYALPRAZOLAM, URINE (RUSH): NEGATIVE 25 NG/ML
AMPHET UR QL SCN: NEGATIVE
BENZOYLECGONINE, URINE (RUSH): NEGATIVE 100 NG/ML
BUPRENORPHINE UR QL SCN: NEGATIVE 25 NG/ML
CODEINE, URINE (RUSH): NEGATIVE 25 NG/ML
CREAT UR-MCNC: 173 MG/DL (ref 28–219)
EDDP, URINE (RUSH): NEGATIVE 25 NG/ML
FENTANYL, URINE (RUSH): NEGATIVE 2.5 NG/ML
HYDROCODONE, URINE (RUSH): NEGATIVE 25 NG/ML
HYDROMORPHONE, URINE (RUSH): NEGATIVE 25 NG/ML
METHADONE UR QL SCN: NEGATIVE 25 NG/ML
METHAMPHET UR QL SCN: NEGATIVE
MORPHINE, URINE (RUSH): NEGATIVE 25 NG/ML
NORBUPRENORPHINE, URINE (RUSH): NEGATIVE 25 NG/ML
NORDIAZEPAM, URINE (RUSH): NEGATIVE 25 NG/ML
NORFENTANYL OXALATE, URINE (RUSH): NEGATIVE 5 NG/ML
NORHYDROCODONE, URINE (RUSH): NEGATIVE 50 NG/ML
NOROXYCODONE HCL, URINE (RUSH): NEGATIVE 50 NG/ML
OXYCODONE UR QL SCN: NEGATIVE 25 NG/ML
OXYMORPHONE, URINE (RUSH): NEGATIVE 25 NG/ML
PH UR STRIP: 7 PH UNITS
SP GR UR STRIP: 1.02
TAPENTADOL, URINE (RUSH): NEGATIVE 25 NG/ML
TEMAZEPAM, URINE (RUSH): NEGATIVE 25 NG/ML
THC-COOH, URINE (RUSH): NEGATIVE 25 NG/ML
TRAMADOL, URINE (RUSH): NEGATIVE 100 NG/ML

## 2024-05-03 NOTE — PROCEDURES
New OB Ultrasound note:      Uterus 11.91 x 7.04 x 7.66 cm    Crown-rump length 8 weeks 4 day  Fetal heart rate 170 beats per minute     Impression:    IUP with fetal heart tones   Estimated gestational age 8 weeks 4 day  Estimated delivery date November 1, 2024

## 2024-05-06 ENCOUNTER — ROUTINE PRENATAL (OUTPATIENT)
Dept: OBSTETRICS AND GYNECOLOGY | Facility: CLINIC | Age: 29
End: 2024-05-06
Payer: MEDICAID

## 2024-05-06 VITALS
HEART RATE: 97 BPM | DIASTOLIC BLOOD PRESSURE: 75 MMHG | WEIGHT: 151.38 LBS | BODY MASS INDEX: 25.19 KG/M2 | SYSTOLIC BLOOD PRESSURE: 109 MMHG

## 2024-05-06 DIAGNOSIS — Z3A.14 14 WEEKS GESTATION OF PREGNANCY: Primary | ICD-10-CM

## 2024-05-06 DIAGNOSIS — R63.4 WEIGHT LOSS: ICD-10-CM

## 2024-05-06 LAB
BILIRUB SERPL-MCNC: NORMAL MG/DL
BLOOD, POC UA: NORMAL
GLUCOSE UR QL STRIP: NORMAL
KETONES UR QL STRIP: NORMAL
LEUKOCYTE ESTERASE URINE, POC: NORMAL
NITRITE, POC UA: NORMAL
PH, POC UA: 7
PROTEIN, POC: 30
SPECIFIC GRAVITY, POC UA: >=1.03
UROBILINOGEN, POC UA: 1

## 2024-05-06 PROCEDURE — 99213 OFFICE O/P EST LOW 20 MIN: CPT | Mod: TH,,, | Performed by: ADVANCED PRACTICE MIDWIFE

## 2024-05-06 NOTE — PROGRESS NOTES
Discussed Breastfeeding and Risks of not breastfeeding summary information from pages 129-152 of [facility] Prenatal Patient Education Handbook  Discussed Preparing to breastfeeding information from pages 131-135 of [facility] Prenatal Patient Education Handbook  Discussed baby feeding cues from pages 134-135 of [facility] Prenatal Patient Education Handbook  Breast feeding positions for baby from page 136-138 of [facility] Prenatal Patient Education Handbook discussed  Discussed Skin to Skin importance (page 71), rooming in (page 124), and safe sleep of baby (page 122) of [facility] Prenatal Patient Education Handbook.  Questions answered and discussed with patient to desired level of satisfaction.  Patient verbalized understanding to all information.

## 2024-05-06 NOTE — PROGRESS NOTES
29 y.o. female  at 14w0d   She c/o no problems  Reports no fetal movement or fluttering. Denies any vaginal bleeding, leakage of fluid, cramping, contractions, or pressure.   Total weight gain/weight loss in pregnancy: -2.087 kg (-4 lb 9.6 oz)     Vitals  BP: 109/75  Pulse: 97  Weight: 68.7 kg (151 lb 6.4 oz)  Prenatal  Fundal Height (cm): 14 cm  Fetal Heart Rate: 153  Movement: Absent  Urine Albumin/Glucose  Urine Albumin: 3+  Urine Glucose: Negative  Edema  LLE Edema: None  RLE Edema: None  Facial: None  Additional Edema?: No    Prenatal Labs:  Lab Results   Component Value Date    GROUPTRH B POS 2024    HGB 12.1 2024    HCT 37.9 (L) 2024     2024    SICKLE Negative 2024    HEPBSAG Non-Reactive 2024    [id number] Non-Reactive 2024    LABNGO Negative 2024    LABURIN Skin/Urogenital Michell Isolated, no further workup. 2024       A: 14w0d           ICD-10-CM ICD-9-CM    1. 14 weeks gestation of pregnancy  Z3A.14 V22.2 POCT URINALYSIS      2. Weight loss  R63.4 783.21           P: Bleeding, daily fetal kick counts, and  labor/labor precautions discussed.    The following were addressed during this visit:    13-16 Weeks  - Quad screen   - Anatomy Ultrasound   - Breastfeeding Concerns & Resources   - Importance of Early Skin to Skin Contact       Questions answered to desired level of satisfaction  Verbalized understanding to all information and instructions provided.  Follow up in about 4 weeks (around 6/3/2024), or if symptoms worsen or fail to improve, for ALISE Hutchins, ISMAEL, CNM, WHNP-BC

## 2024-05-08 ENCOUNTER — PROCEDURE VISIT (OUTPATIENT)
Dept: OBSTETRICS AND GYNECOLOGY | Facility: CLINIC | Age: 29
End: 2024-05-08
Payer: MEDICAID

## 2024-05-08 VITALS
BODY MASS INDEX: 25.03 KG/M2 | SYSTOLIC BLOOD PRESSURE: 110 MMHG | WEIGHT: 150.38 LBS | HEART RATE: 106 BPM | DIASTOLIC BLOOD PRESSURE: 65 MMHG

## 2024-05-08 DIAGNOSIS — Z3A.14 14 WEEKS GESTATION OF PREGNANCY: Primary | ICD-10-CM

## 2024-05-08 DIAGNOSIS — R87.612 LGSIL ON PAP SMEAR OF CERVIX: ICD-10-CM

## 2024-05-08 PROCEDURE — 99499 UNLISTED E&M SERVICE: CPT | Mod: ,,, | Performed by: OBSTETRICS & GYNECOLOGY

## 2024-05-08 PROCEDURE — 57456 ENDOCERV CURETTAGE W/SCOPE: CPT | Mod: ,,, | Performed by: OBSTETRICS & GYNECOLOGY

## 2024-05-08 PROCEDURE — 88342 IMHCHEM/IMCYTCHM 1ST ANTB: CPT | Mod: TC,SUR | Performed by: OBSTETRICS & GYNECOLOGY

## 2024-05-08 PROCEDURE — 88305 TISSUE EXAM BY PATHOLOGIST: CPT | Mod: 26,,, | Performed by: PATHOLOGY

## 2024-05-08 PROCEDURE — 88342 IMHCHEM/IMCYTCHM 1ST ANTB: CPT | Mod: 26,,, | Performed by: PATHOLOGY

## 2024-05-08 NOTE — PROCEDURES
Colposcopy    Date/Time: 5/8/2024 10:45 AM    Performed by: Kody Smiley MD  Authorized by: Kody Smiley MD    Consent obatined:  Prior to procedure the appropriate consent was completed and verified  Timeout:Immediately prior to procedure a time out was called to verify the correct patient, procedure, equipment, support staff and site/side marked as required  Prep:Patient was prepped and draped in the usual sterile fashion  Assistants?: Yes    List of assistants:  Lashawn Zhao I was present for the entire procedure.    Colposcopy Site:  Cervix  Position:  Supine   Patient was prepped and draped in the normal sterile fashion.  Acrowhite Lesion? Yes    Atypical Vessels: No    Transformation Zone Adequate?: Yes    Biopsy?: Yes         Location:  Cervix ((3 00, 6 00, 9 00 and 12 00))  ECC Performed?: No    LEEP Performed?: No    Estimated blood loss (cc):  1   Patient tolerated the procedure well with no immediate complications.   Post-operative instructions were provided for the patient.   Patient was discharged and will follow up if any complications occur

## 2024-05-09 LAB
DHEA SERPL-MCNC: NORMAL
ESTROGEN SERPL-MCNC: NORMAL PG/ML
INSULIN SERPL-ACNC: NORMAL U[IU]/ML
LAB AP CLINICAL INFORMATION: NORMAL
LAB AP GROSS DESCRIPTION: NORMAL
LAB AP LABORATORY NOTES: NORMAL
T3RU NFR SERPL: NORMAL %

## 2024-06-04 ENCOUNTER — ROUTINE PRENATAL (OUTPATIENT)
Dept: OBSTETRICS AND GYNECOLOGY | Facility: CLINIC | Age: 29
End: 2024-06-04
Payer: MEDICAID

## 2024-06-04 VITALS
BODY MASS INDEX: 25.69 KG/M2 | SYSTOLIC BLOOD PRESSURE: 106 MMHG | WEIGHT: 154.38 LBS | HEART RATE: 98 BPM | DIASTOLIC BLOOD PRESSURE: 69 MMHG

## 2024-06-04 DIAGNOSIS — Z36.89 ENCOUNTER FOR FETAL ANATOMIC SURVEY: ICD-10-CM

## 2024-06-04 DIAGNOSIS — Z3A.18 18 WEEKS GESTATION OF PREGNANCY: Primary | ICD-10-CM

## 2024-06-04 DIAGNOSIS — R31.9 HEMATURIA, UNSPECIFIED TYPE: ICD-10-CM

## 2024-06-04 LAB
BILIRUB SERPL-MCNC: NORMAL MG/DL
BLOOD, POC UA: NORMAL
GLUCOSE UR QL STRIP: NORMAL
KETONES UR QL STRIP: NORMAL
LEUKOCYTE ESTERASE URINE, POC: NORMAL
NITRITE, POC UA: NORMAL
PH, POC UA: 7.5
PROTEIN, POC: NORMAL
SPECIFIC GRAVITY, POC UA: 1.01
UROBILINOGEN, POC UA: 0.2

## 2024-06-04 PROCEDURE — 59425 ANTEPARTUM CARE ONLY: CPT | Mod: TH,,, | Performed by: ADVANCED PRACTICE MIDWIFE

## 2024-06-04 RX ORDER — NITROFURANTOIN 25; 75 MG/1; MG/1
100 CAPSULE ORAL 2 TIMES DAILY
Qty: 14 CAPSULE | Refills: 0 | Status: SHIPPED | OUTPATIENT
Start: 2024-06-04 | End: 2024-06-11

## 2024-06-04 NOTE — PROGRESS NOTES
29 y.o. female  at 18w1d   She c/o states has noticed some blood with urinating. She denies any other issues or problems  Reports good fetal movement or fluttering. Denies any vaginal bleeding, leakage of fluid, cramping, contractions, or pressure.   Total weight gain/weight loss in pregnancy: -0.726 kg (-1 lb 9.6 oz)     Vitals  BP: 106/69  Pulse: 98  Weight: 70 kg (154 lb 6.4 oz)  Prenatal  Fundal Height (cm): 18 cm  Fetal Heart Rate: 150  Movement: Present  Urine Albumin/Glucose  Urine Albumin: Negative  Urine Glucose: Negative  Edema  LLE Edema: None  RLE Edema: None  Facial: None  Additional Edema?: No    Prenatal Labs:  Lab Results   Component Value Date    GROUPTRH B POS 2024    HGB 12.1 2024    HCT 37.9 (L) 2024     2024    SICKLE Negative 2024    HEPBSAG Non-Reactive 2024    [id number] Non-Reactive 2024    LABNGO Negative 2024    LABURIN Skin/Urogenital Michell Isolated, no further workup. 2024       A: 18w1d           ICD-10-CM ICD-9-CM    1. 18 weeks gestation of pregnancy  Z3A.18 V22.2 POCT URINALYSIS      2. Encounter for fetal anatomic survey  Z36.89 V28.81 US OB 14+ Wks, TransAbd, Single Gestation      3. Hematuria, unspecified type  R31.9 599.70 nitrofurantoin, macrocrystal-monohydrate, (MACROBID) 100 MG capsule          P: Bleeding, daily fetal kick counts, and  labor/labor precautions discussed.    The following were addressed during this visit:    17-20 Weeks  - Quickening   - Lifestyle   - Ultrasound   - Importance of Early and Frequent Breastfeeding   - Baby-led Feeding   - Frequent feeding to help assure optimal milk production     Small frequent meals  Increase water in diet  Perineal hygiene discussed  Questions answered to desired level of satisfaction  Verbalized understanding to all information and instructions provided.  Follow up in about 4 weeks (around 2024), or if symptoms worsen or fail to improve, for  ALISE Hutchins, DNP, CNM, WHNP-BC

## 2024-06-11 ENCOUNTER — OFFICE VISIT (OUTPATIENT)
Dept: OBSTETRICS AND GYNECOLOGY | Facility: CLINIC | Age: 29
End: 2024-06-11
Payer: MEDICAID

## 2024-06-11 VITALS
DIASTOLIC BLOOD PRESSURE: 57 MMHG | HEIGHT: 65 IN | SYSTOLIC BLOOD PRESSURE: 100 MMHG | WEIGHT: 153.81 LBS | HEART RATE: 94 BPM | BODY MASS INDEX: 25.63 KG/M2

## 2024-06-11 DIAGNOSIS — N72 HIGH RISK HUMAN PAPILLOMA VIRUS (HPV) INFECTION OF CERVIX: Primary | ICD-10-CM

## 2024-06-11 DIAGNOSIS — B97.7 HIGH RISK HUMAN PAPILLOMA VIRUS (HPV) INFECTION OF CERVIX: Primary | ICD-10-CM

## 2024-06-11 PROCEDURE — 3078F DIAST BP <80 MM HG: CPT | Mod: CPTII,,, | Performed by: OBSTETRICS & GYNECOLOGY

## 2024-06-11 PROCEDURE — 3074F SYST BP LT 130 MM HG: CPT | Mod: CPTII,,, | Performed by: OBSTETRICS & GYNECOLOGY

## 2024-06-11 PROCEDURE — 99214 OFFICE O/P EST MOD 30 MIN: CPT | Mod: ,,, | Performed by: OBSTETRICS & GYNECOLOGY

## 2024-06-11 PROCEDURE — 3008F BODY MASS INDEX DOCD: CPT | Mod: CPTII,,, | Performed by: OBSTETRICS & GYNECOLOGY

## 2024-06-11 PROCEDURE — 1159F MED LIST DOCD IN RCRD: CPT | Mod: CPTII,,, | Performed by: OBSTETRICS & GYNECOLOGY

## 2024-06-11 PROCEDURE — 3044F HG A1C LEVEL LT 7.0%: CPT | Mod: CPTII,,, | Performed by: OBSTETRICS & GYNECOLOGY

## 2024-06-11 NOTE — PROGRESS NOTES
Subjective     Patient ID: Alana Hdez is a 29 y.o. female.    Chief Complaint:  Follow-up (Two week follow up from colpo)      History of Present Illness  HPI  Pt here to follow up from Colposcopy    GYN & OB History  Patient's last menstrual period was 2024 (exact date).   Date of Last Pap: 2020    OB History    Para Term  AB Living   2 1 1     1   SAB IAB Ectopic Multiple Live Births           1      # Outcome Date GA Lbr Chilo/2nd Weight Sex Type Anes PTL Lv   2 Current            1 Term 10/18/17 40w0d  4.309 kg (9 lb 8 oz) M Vag-Spont OTHER N EULA       Review of Systems  Review of Systems   Constitutional:  Negative for activity change, appetite change, fatigue and unexpected weight change.   HENT: Negative.     Respiratory:  Negative for cough, shortness of breath and wheezing.    Cardiovascular:  Negative for chest pain, palpitations and leg swelling.   Gastrointestinal:  Negative for abdominal pain, bloating, blood in stool, constipation, diarrhea, nausea, vomiting and reflux.   Genitourinary:  Negative for bladder incontinence, decreased libido, dysmenorrhea, dyspareunia, dysuria, flank pain, frequency, menorrhagia, menstrual problem, pelvic pain, urgency, vaginal bleeding, vaginal discharge, postcoital bleeding and vaginal odor.   Musculoskeletal:  Negative for back pain.   Integumentary:  Negative for rash, acne, hair changes, mole/lesion, breast mass, nipple discharge, breast skin changes and breast tenderness.   Neurological:  Negative for headaches.   Psychiatric/Behavioral:  Negative for depression and sleep disturbance. The patient is not nervous/anxious.    Breast: Negative for asymmetry, breast self exam, lump, mass, nipple discharge, skin changes and tenderness         Objective   Physical Exam:   Constitutional: She is oriented to person, place, and time. She appears well-developed and well-nourished.    HENT:   Head: Normocephalic and atraumatic.    Eyes: Pupils  are equal, round, and reactive to light. EOM are normal.     Cardiovascular:  Normal rate, regular rhythm and normal heart sounds.             Pulmonary/Chest: Effort normal and breath sounds normal.        Abdominal: Soft. Bowel sounds are normal.             Musculoskeletal: Normal range of motion and moves all extremeties.       Neurological: She is alert and oriented to person, place, and time. She has normal reflexes.     Psychiatric: She has a normal mood and affect. Her behavior is normal. Judgment and thought content normal.            Assessment and Plan     No diagnosis found.   Reviewed pathology results with pt:  A. Cervix, 12 o'clock, biopsy:  - Squamocolumnar junctional mucosa with acute and chronic inflammation  - Negative for dysplasia     B. Cervix, 3 o'clock, biopsy:  - Squamocolumnar junctional mucosa without diagnostic abnormality  - Negative for dysplasia      C. Cervix, 6 o'clock, biopsy:  - Squamocolumnar junctional mucosa with acute and chronic inflammation  - Negative for dysplasia     D. Cervix, 9 o'clock, biopsy:  - Squamous mucosa without diagnostic abnormality  - Negative for dysplasia        Plan:  Results reviewed with the pt  Recommend repeat pap 6-8 weeks postpartm

## 2024-06-20 ENCOUNTER — HOSPITAL ENCOUNTER (OUTPATIENT)
Dept: RADIOLOGY | Facility: HOSPITAL | Age: 29
Discharge: HOME | End: 2024-06-20
Attending: ADVANCED PRACTICE MIDWIFE
Payer: MEDICAID

## 2024-06-20 DIAGNOSIS — Z36.89 ENCOUNTER FOR FETAL ANATOMIC SURVEY: ICD-10-CM

## 2024-06-20 PROCEDURE — 76805 OB US >/= 14 WKS SNGL FETUS: CPT | Mod: TC

## 2024-07-03 ENCOUNTER — ROUTINE PRENATAL (OUTPATIENT)
Dept: OBSTETRICS AND GYNECOLOGY | Facility: CLINIC | Age: 29
End: 2024-07-03
Payer: MEDICAID

## 2024-07-03 VITALS
HEART RATE: 92 BPM | BODY MASS INDEX: 25.89 KG/M2 | WEIGHT: 155.63 LBS | SYSTOLIC BLOOD PRESSURE: 104 MMHG | DIASTOLIC BLOOD PRESSURE: 61 MMHG

## 2024-07-03 DIAGNOSIS — Z3A.22 22 WEEKS GESTATION OF PREGNANCY: Primary | ICD-10-CM

## 2024-07-03 LAB
BILIRUB SERPL-MCNC: NEGATIVE MG/DL
BLOOD, POC UA: NEGATIVE
GLUCOSE UR QL STRIP: NEGATIVE
KETONES UR QL STRIP: NEGATIVE
LEUKOCYTE ESTERASE URINE, POC: NORMAL
NITRITE, POC UA: NEGATIVE
PH, POC UA: 7.5
PROTEIN, POC: NORMAL
SPECIFIC GRAVITY, POC UA: 1.02
UROBILINOGEN, POC UA: 0.2

## 2024-07-03 PROCEDURE — 59425 ANTEPARTUM CARE ONLY: CPT | Mod: TH,,, | Performed by: ADVANCED PRACTICE MIDWIFE

## 2024-07-03 NOTE — PROGRESS NOTES
29 y.o. female  at 22w2d   She c/o some dizziness at work with standing and working long hours- work hours decreased to 8 hrs/day no more than 5 days per week with a 15 minute break every 2 hrs with work letter provided indicating request.  Reports good fetal movement or fluttering. Denies any vaginal bleeding, leakage of fluid, cramping, contractions, or pressure.   Total weight gain/weight loss in pregnancy: -0.181 kg (-6.4 oz)     Vitals  BP: 104/61  Pulse: 92  Weight: 70.6 kg (155 lb 9.6 oz)  Prenatal  Fundal Height (cm): 23 cm  Fetal Heart Rate: 150  Movement: Present  Urine Albumin/Glucose  Urine Albumin: Trace  Urine Glucose: Negative  Edema  LLE Edema: None  RLE Edema: None  Facial: None  Additional Edema?: No    Prenatal Labs:  Lab Results   Component Value Date    GROUPTRH B POS 2024    HGB 12.1 2024    HCT 37.9 (L) 2024     2024    SICKLE Negative 2024    HEPBSAG Non-Reactive 2024    [id number] Non-Reactive 2024    LABNGO Negative 2024    LABURIN Skin/Urogenital Michell Isolated, no further workup. 2024       A: 22w2d           ICD-10-CM ICD-9-CM    1. 22 weeks gestation of pregnancy  Z3A.22 V22.2 POCT URINALYSIS          P: Bleeding, daily fetal kick counts, and  labor/labor precautions discussed.  No meal skipping  Increase protein in diet    The following were addressed during this visit:    21-24 Weeks  -  Labor Signs   - Travel During Pregnancy   - Gestational diabetes screening protocol   - Effective Position and Latch   - Risks of Formula Use   - Risks of pacifier use       Questions answered to desired level of satisfaction  Verbalized understanding to all information and instructions provided.  Follow up in about 4 weeks (around 2024), or if symptoms worsen or fail to improve, for ALISE Hutchins, DNP, CNM, WHNP-BC

## 2024-07-03 NOTE — LETTER
July 3, 2024    Alana Hdez  [address] [facility] [place] [address]             [facility]  [address] [facility] [address]  Phone: [phone]  Fax: [phone] TO WHOM IT MAY CONCERN:    Ms Hdez is currently under my care for pregnancy. Patient needs to have 15 minutes breaks every 2 hours and work no more than 8 hours per day.    If you have any questions or concerns, please don't hesitate to call.    Sincerely,        Marissa Hutchins, MELECIOM

## 2024-07-18 ENCOUNTER — OFFICE VISIT (OUTPATIENT)
Dept: FAMILY MEDICINE | Facility: CLINIC | Age: 29
End: 2024-07-18
Payer: MEDICAID

## 2024-07-18 ENCOUNTER — TELEPHONE (OUTPATIENT)
Dept: OBSTETRICS AND GYNECOLOGY | Facility: CLINIC | Age: 29
End: 2024-07-18
Payer: MEDICAID

## 2024-07-18 VITALS
OXYGEN SATURATION: 100 % | HEIGHT: 65 IN | TEMPERATURE: 99 F | WEIGHT: 160 LBS | HEART RATE: 103 BPM | DIASTOLIC BLOOD PRESSURE: 73 MMHG | RESPIRATION RATE: 17 BRPM | BODY MASS INDEX: 26.66 KG/M2 | SYSTOLIC BLOOD PRESSURE: 106 MMHG

## 2024-07-18 DIAGNOSIS — U07.1 COVID: Primary | ICD-10-CM

## 2024-07-18 DIAGNOSIS — Z20.828 CONTACT WITH AND (SUSPECTED) EXPOSURE TO OTHER VIRAL COMMUNICABLE DISEASES: ICD-10-CM

## 2024-07-18 LAB
CTP QC/QA: YES
SARS-COV-2 RDRP RESP QL NAA+PROBE: POSITIVE

## 2024-07-18 PROCEDURE — 87635 SARS-COV-2 COVID-19 AMP PRB: CPT | Mod: RHCUB | Performed by: NURSE PRACTITIONER

## 2024-07-18 PROCEDURE — 99213 OFFICE O/P EST LOW 20 MIN: CPT | Mod: ,,, | Performed by: NURSE PRACTITIONER

## 2024-07-18 PROCEDURE — 3008F BODY MASS INDEX DOCD: CPT | Mod: CPTII,,, | Performed by: NURSE PRACTITIONER

## 2024-07-18 PROCEDURE — 3044F HG A1C LEVEL LT 7.0%: CPT | Mod: CPTII,,, | Performed by: NURSE PRACTITIONER

## 2024-07-18 PROCEDURE — 1160F RVW MEDS BY RX/DR IN RCRD: CPT | Mod: CPTII,,, | Performed by: NURSE PRACTITIONER

## 2024-07-18 PROCEDURE — 3078F DIAST BP <80 MM HG: CPT | Mod: CPTII,,, | Performed by: NURSE PRACTITIONER

## 2024-07-18 PROCEDURE — 3074F SYST BP LT 130 MM HG: CPT | Mod: CPTII,,, | Performed by: NURSE PRACTITIONER

## 2024-07-18 PROCEDURE — 1159F MED LIST DOCD IN RCRD: CPT | Mod: CPTII,,, | Performed by: NURSE PRACTITIONER

## 2024-07-18 RX ORDER — ERGOCALCIFEROL 1.25 MG/1
50000 CAPSULE ORAL
COMMUNITY
Start: 2024-04-28

## 2024-07-18 NOTE — PATIENT INSTRUCTIONS
Drink plenty of fluids   Over-the-counter  Tylenol for fever, plain Robitussin for cough   Discussed Paxlovid risks and benefits and declined   Go the emergency room with difficulty breathing   Quarantine for 5 days   Return to clinic as needed

## 2024-07-18 NOTE — TELEPHONE ENCOUNTER
Patient called stating that she tested positive for Covid with a home test and wanted to know what she can take for her symptoms. Patient states she has a headache, sore throat and sinus congestion. Informed pt she can take Tylenol for fever and pain, Benadryl or Robitussin, and she needed to take Aspirin daily till 36 weeks gestation. Patient asked if she need to go to a clinic to be seen. Informed patient if she is needing documentation for work then she needed to be swabbed so it could be documented. Patient verbalized understanding.

## 2024-07-18 NOTE — PROGRESS NOTES
"Subjective:       Patient ID: Alana Hdez is a 29 y.o. female.    Chief Complaint: COVID-19 Concerns (Tested positive at home this morning. Sore throat, chest congestion, back pain, and headaches started yesterday. No fever. 24 wks pregnant. )     Presents to clinic as above.  She is 24 weeks gestation.  No shortness a breath.        Review of Systems   Constitutional:  Positive for malaise/fatigue.   HENT:  Positive for congestion and sore throat. Negative for ear pain.    Respiratory:  Positive for cough.    Cardiovascular: Negative.    Musculoskeletal:  Positive for myalgias.   Neurological:  Positive for headaches.          Reviewed family, medical, surgical, and social history.    Objective:      /73 (BP Location: Left arm, Patient Position: Sitting, BP Method: Medium (Automatic))   Pulse 103   Temp 99.2 °F (37.3 °C) (Oral)   Resp 17   Ht 5' 5" (1.651 m)   Wt 72.6 kg (160 lb)   LMP 01/29/2024 (Exact Date)   SpO2 100%   BMI 26.63 kg/m²   Physical Exam  Vitals and nursing note reviewed.   Constitutional:       General: She is not in acute distress.     Appearance: Normal appearance. She is normal weight. She is not ill-appearing, toxic-appearing or diaphoretic.   HENT:      Head: Normocephalic.      Right Ear: Hearing, tympanic membrane, ear canal and external ear normal.      Left Ear: Hearing, tympanic membrane, ear canal and external ear normal.      Nose: Mucosal edema, congestion and rhinorrhea present. Rhinorrhea is clear.      Right Turbinates: Enlarged and swollen.      Left Turbinates: Enlarged and swollen.      Right Sinus: No maxillary sinus tenderness or frontal sinus tenderness.      Left Sinus: No maxillary sinus tenderness or frontal sinus tenderness.      Mouth/Throat:      Lips: Pink.      Mouth: Mucous membranes are moist.      Pharynx: Uvula midline. Posterior oropharyngeal erythema present. No pharyngeal swelling, oropharyngeal exudate or uvula swelling.      Tonsils: " No tonsillar exudate or tonsillar abscesses.   Cardiovascular:      Rate and Rhythm: Normal rate and regular rhythm.      Heart sounds: Normal heart sounds.   Pulmonary:      Effort: Pulmonary effort is normal.      Breath sounds: Normal breath sounds.   Musculoskeletal:      Cervical back: Normal range of motion and neck supple. No rigidity or tenderness.   Lymphadenopathy:      Cervical: No cervical adenopathy.   Skin:     General: Skin is warm and dry.   Neurological:      Mental Status: She is alert.   Psychiatric:         Mood and Affect: Mood normal.         Behavior: Behavior normal.         Thought Content: Thought content normal.         Judgment: Judgment normal.            Office Visit on 07/18/2024   Component Date Value Ref Range Status    POC Rapid COVID 07/18/2024 Positive (A)  Negative Final     Acceptable 07/18/2024 Yes   Final      Assessment:       1. COVID    2. Contact with and (suspected) exposure to other viral communicable diseases        Plan:       COVID  -     Cancel: Basic Metabolic Panel; Future; Expected date: 07/18/2024    Contact with and (suspected) exposure to other viral communicable diseases  -     POCT COVID-19 Rapid Screening    Drink plenty of fluids   Over-the-counter  Tylenol for fever, plain Robitussin for cough   Discussed Paxlovid risks and benefits and declined   Go the emergency room with difficulty breathing   Quarantine for 5 days   Return to clinic as needed          Risks, benefits, and side effects were discussed with the patient. All questions were answered to the fullest satisfaction of the patient, and pt verbalized understanding and agreement to treatment plan. Pt was to call with any new or worsening symptoms, or present to the ER.

## 2024-07-29 ENCOUNTER — ROUTINE PRENATAL (OUTPATIENT)
Dept: OBSTETRICS AND GYNECOLOGY | Facility: CLINIC | Age: 29
End: 2024-07-29
Payer: MEDICAID

## 2024-07-29 VITALS
HEART RATE: 92 BPM | SYSTOLIC BLOOD PRESSURE: 105 MMHG | DIASTOLIC BLOOD PRESSURE: 72 MMHG | BODY MASS INDEX: 25.63 KG/M2 | WEIGHT: 154 LBS

## 2024-07-29 DIAGNOSIS — R63.4 WEIGHT LOSS: ICD-10-CM

## 2024-07-29 DIAGNOSIS — Z3A.26 26 WEEKS GESTATION OF PREGNANCY: Primary | ICD-10-CM

## 2024-07-29 LAB
BILIRUB SERPL-MCNC: NEGATIVE MG/DL
BLOOD, POC UA: NEGATIVE
GLUCOSE UR QL STRIP: NEGATIVE
KETONES UR QL STRIP: NEGATIVE
LEUKOCYTE ESTERASE URINE, POC: NORMAL
NITRITE, POC UA: NEGATIVE
PH, POC UA: 7
PROTEIN, POC: NORMAL
SPECIFIC GRAVITY, POC UA: 1.02
UROBILINOGEN, POC UA: 2

## 2024-07-29 PROCEDURE — 59425 ANTEPARTUM CARE ONLY: CPT | Mod: TH,,, | Performed by: ADVANCED PRACTICE MIDWIFE

## 2024-07-29 NOTE — PROGRESS NOTES
29 y.o. female  at 26w0d   She c/o no problems today  Reports god fetal movement or fluttering. Denies any vaginal bleeding, leakage of fluid, cramping, contractions, or pressure.   Total weight gain/weight loss in pregnancy: -0.907 kg (-2 lb)     Vitals  BP: 105/72  Pulse: 92  Weight: 69.9 kg (154 lb)  Prenatal  Fundal Height (cm): 26 cm  Fetal Heart Rate: 147  Movement: Present  Urine Albumin/Glucose  Urine Albumin: Trace  Urine Glucose: Negative  Edema  LLE Edema: None  RLE Edema: None  Facial: None  Additional Edema?: No    Prenatal Labs:  Lab Results   Component Value Date    GROUPTRH B POS 2024    HGB 12.1 2024    HCT 37.9 (L) 2024     2024    SICKLE Negative 2024    HEPBSAG Non-Reactive 2024    [id number] Non-Reactive 2024    LABNGO Negative 2024    LABURIN Skin/Urogenital Michell Isolated, no further workup. 2024    [id number] Positive (A) 2024       A: 26w0d           ICD-10-CM ICD-9-CM    1. 26 weeks gestation of pregnancy  Z3A.26 V22.2 POCT URINALYSIS      2. Weight loss  R63.4 783.21           P: Bleeding, daily fetal kick counts, and  labor/labor precautions discussed.    The following were addressed during this visit:    25-28 Weeks  -  Labor Signs   - Childbirth Education   - Maternity Leave paperwork   - Smoking Intervention   - Weight Gain/Diet/Exercise   - Rhogam Given   - Rooming in baby during your hospital stay       Questions answered to desired level of satisfaction  Verbalized understanding to all information and instructions provided.  Follow up in about 2 weeks (around 2024), or if symptoms worsen or fail to improve, for CLEVE, 1 hr gtt.    Marissa Hutchins, ISMAEL, CNM, WHNP-BC

## 2024-08-12 ENCOUNTER — ROUTINE PRENATAL (OUTPATIENT)
Dept: OBSTETRICS AND GYNECOLOGY | Facility: CLINIC | Age: 29
End: 2024-08-12
Payer: MEDICAID

## 2024-08-12 VITALS
BODY MASS INDEX: 26.46 KG/M2 | HEART RATE: 88 BPM | WEIGHT: 159 LBS | DIASTOLIC BLOOD PRESSURE: 73 MMHG | SYSTOLIC BLOOD PRESSURE: 103 MMHG

## 2024-08-12 DIAGNOSIS — M54.30 SCIATICA, UNSPECIFIED LATERALITY: ICD-10-CM

## 2024-08-12 DIAGNOSIS — Z3A.28 28 WEEKS GESTATION OF PREGNANCY: Primary | ICD-10-CM

## 2024-08-12 LAB
BASOPHILS # BLD AUTO: 0.06 K/UL (ref 0–0.2)
BASOPHILS NFR BLD AUTO: 0.6 % (ref 0–1)
BILIRUB SERPL-MCNC: NEGATIVE MG/DL
BLOOD, POC UA: NEGATIVE
DIFFERENTIAL METHOD BLD: ABNORMAL
EOSINOPHIL # BLD AUTO: 0.15 K/UL (ref 0–0.5)
EOSINOPHIL NFR BLD AUTO: 1.6 % (ref 1–4)
ERYTHROCYTE [DISTWIDTH] IN BLOOD BY AUTOMATED COUNT: 13.4 % (ref 11.5–14.5)
GLUCOSE SERPL-MCNC: 134 MG/DL (ref 74–106)
GLUCOSE UR QL STRIP: NEGATIVE
HCT VFR BLD AUTO: 35.8 % (ref 38–47)
HGB BLD-MCNC: 11.3 G/DL (ref 12–16)
IMM GRANULOCYTES # BLD AUTO: 0.06 K/UL (ref 0–0.04)
IMM GRANULOCYTES NFR BLD: 0.6 % (ref 0–0.4)
KETONES UR QL STRIP: NEGATIVE
LEUKOCYTE ESTERASE URINE, POC: NORMAL
LYMPHOCYTES # BLD AUTO: 1.5 K/UL (ref 1–4.8)
LYMPHOCYTES NFR BLD AUTO: 16.2 % (ref 27–41)
MCH RBC QN AUTO: 26.1 PG (ref 27–31)
MCHC RBC AUTO-ENTMCNC: 31.6 G/DL (ref 32–36)
MCV RBC AUTO: 82.7 FL (ref 80–96)
MONOCYTES # BLD AUTO: 0.59 K/UL (ref 0–0.8)
MONOCYTES NFR BLD AUTO: 6.4 % (ref 2–6)
MPC BLD CALC-MCNC: 12.9 FL (ref 9.4–12.4)
NEUTROPHILS # BLD AUTO: 6.89 K/UL (ref 1.8–7.7)
NEUTROPHILS NFR BLD AUTO: 74.6 % (ref 53–65)
NITRITE, POC UA: NEGATIVE
NRBC # BLD AUTO: 0 X10E3/UL
NRBC, AUTO (.00): 0 %
PH, POC UA: 7
PLATELET # BLD AUTO: 211 K/UL (ref 150–400)
PROTEIN, POC: NEGATIVE
RBC # BLD AUTO: 4.33 M/UL (ref 4.2–5.4)
SPECIFIC GRAVITY, POC UA: 1.02
UROBILINOGEN, POC UA: 1
WBC # BLD AUTO: 9.25 K/UL (ref 4.5–11)

## 2024-08-12 PROCEDURE — 36415 COLL VENOUS BLD VENIPUNCTURE: CPT | Mod: ,,, | Performed by: ADVANCED PRACTICE MIDWIFE

## 2024-08-12 PROCEDURE — 59425 ANTEPARTUM CARE ONLY: CPT | Mod: TH,,, | Performed by: ADVANCED PRACTICE MIDWIFE

## 2024-08-12 NOTE — PROGRESS NOTES
29 y.o. female  at 28w0d   She c/o low back discomfort that occasional radiate down leg  Reports good fetal movement or fluttering. Denies any vaginal bleeding, leakage of fluid, cramping, contractions, or pressure.   Total weight gain/weight loss in pregnancy: 1.361 kg (3 lb)     Vitals  BP: 103/73  Pulse: 88  Weight: 72.1 kg (159 lb)  Prenatal  Fundal Height (cm): 28 cm  Fetal Heart Rate: 137  Movement: Present  Urine Albumin/Glucose  Urine Albumin: Negative  Urine Glucose: Negative  Edema  LLE Edema: None  RLE Edema: None  Facial: None  Additional Edema?: No    Prenatal Labs:  Lab Results   Component Value Date    GROUPTRH B POS 2024    HGB 12.1 2024    HCT 37.9 (L) 2024     2024    SICKLE Negative 2024    HEPBSAG Non-Reactive 2024    [id number] Non-Reactive 2024    LABNGO Negative 2024    LABURIN Skin/Urogenital Michell Isolated, no further workup. 2024    [id number] Positive (A) 2024       A: 28w0d           ICD-10-CM ICD-9-CM    1. 28 weeks gestation of pregnancy  Z3A.28 V22.2 POCT URINALYSIS      Drug Screen Definitive 14, Urine      Glucose, 1Hr Post Prandial      CBC Auto Differential      Drug Screen Definitive 14, Urine      2. Sciatica, unspecified laterality  M54.30 724.3           P: Bleeding, daily fetal kick counts, and  labor/labor precautions discussed.    No pregnancy checklist tasks were completed during this visit, and no tasks are pending completion.      Questions answered to desired level of satisfaction  Verbalized understanding to all information and instructions provided.  Follow up in about 2 weeks (around 2024), or if symptoms worsen or fail to improve, for ALISE Hutchins, ISMAEL, CNM, WHNP-BC

## 2024-08-16 LAB
6-ACETYLMORPHINE, URINE (RUSH): NEGATIVE 10 NG/ML
7-AMINOCLONAZEPAM, URINE (RUSH): NEGATIVE 25 NG/ML
A-HYDROXYALPRAZOLAM, URINE (RUSH): NEGATIVE 25 NG/ML
AMPHET UR QL SCN: NEGATIVE
BENZOYLECGONINE, URINE (RUSH): NEGATIVE 100 NG/ML
BUPRENORPHINE UR QL SCN: NEGATIVE 25 NG/ML
CODEINE, URINE (RUSH): NEGATIVE 25 NG/ML
CREAT UR-MCNC: 197 MG/DL (ref 28–219)
EDDP, URINE (RUSH): NEGATIVE 25 NG/ML
FENTANYL, URINE (RUSH): NEGATIVE 2.5 NG/ML
HYDROCODONE, URINE (RUSH): NEGATIVE 25 NG/ML
HYDROMORPHONE, URINE (RUSH): NEGATIVE 25 NG/ML
METHADONE UR QL SCN: NEGATIVE 25 NG/ML
METHAMPHET UR QL SCN: NEGATIVE
MORPHINE, URINE (RUSH): NEGATIVE 25 NG/ML
NORBUPRENORPHINE, URINE (RUSH): NEGATIVE 25 NG/ML
NORDIAZEPAM, URINE (RUSH): NEGATIVE 25 NG/ML
NORFENTANYL OXALATE, URINE (RUSH): NEGATIVE 5 NG/ML
NORHYDROCODONE, URINE (RUSH): NEGATIVE 50 NG/ML
NOROXYCODONE HCL, URINE (RUSH): NEGATIVE 50 NG/ML
OXYCODONE UR QL SCN: NEGATIVE 25 NG/ML
OXYMORPHONE, URINE (RUSH): NEGATIVE 25 NG/ML
PH UR STRIP: 7 PH UNITS
SP GR UR STRIP: 1.02
TAPENTADOL, URINE (RUSH): NEGATIVE 25 NG/ML
TEMAZEPAM, URINE (RUSH): NEGATIVE 25 NG/ML
THC-COOH, URINE (RUSH): NEGATIVE 25 NG/ML
TRAMADOL, URINE (RUSH): NEGATIVE 100 NG/ML

## 2024-08-26 ENCOUNTER — ROUTINE PRENATAL (OUTPATIENT)
Dept: OBSTETRICS AND GYNECOLOGY | Facility: CLINIC | Age: 29
End: 2024-08-26
Payer: MEDICAID

## 2024-08-26 VITALS
HEART RATE: 78 BPM | SYSTOLIC BLOOD PRESSURE: 107 MMHG | DIASTOLIC BLOOD PRESSURE: 75 MMHG | BODY MASS INDEX: 27.12 KG/M2 | WEIGHT: 163 LBS

## 2024-08-26 DIAGNOSIS — O12.13 GESTATIONAL PROTEINURIA IN THIRD TRIMESTER: ICD-10-CM

## 2024-08-26 DIAGNOSIS — N39.0 URINARY TRACT INFECTION WITHOUT HEMATURIA, SITE UNSPECIFIED: ICD-10-CM

## 2024-08-26 DIAGNOSIS — Z3A.30 30 WEEKS GESTATION OF PREGNANCY: Primary | ICD-10-CM

## 2024-08-26 LAB
BILIRUB SERPL-MCNC: NORMAL MG/DL
BLOOD, POC UA: NEGATIVE
GLUCOSE UR QL STRIP: NEGATIVE
KETONES UR QL STRIP: 40
LEUKOCYTE ESTERASE URINE, POC: NORMAL
NITRITE, POC UA: POSITIVE
PH, POC UA: 6.5
PROTEIN, POC: 100
SPECIFIC GRAVITY, POC UA: >=1.03
UROBILINOGEN, POC UA: 2

## 2024-08-26 PROCEDURE — 59426 ANTEPARTUM CARE ONLY: CPT | Mod: TH,,, | Performed by: ADVANCED PRACTICE MIDWIFE

## 2024-08-26 RX ORDER — NITROFURANTOIN 25; 75 MG/1; MG/1
100 CAPSULE ORAL 2 TIMES DAILY
Qty: 14 CAPSULE | Refills: 0 | Status: SHIPPED | OUTPATIENT
Start: 2024-08-26 | End: 2024-09-02

## 2024-08-26 NOTE — PROGRESS NOTES
29 y.o. female  at 30w0d   She c/o no problems  Reports good fetal movement or fluttering. Denies any vaginal bleeding, leakage of fluid, cramping, contractions, or pressure.   Total weight gain/weight loss in pregnancy: 3.175 kg (7 lb)     Vitals  BP: 107/75  Pulse: 78  Weight: 73.9 kg (163 lb)  Prenatal  Fundal Height (cm): 30 cm  Fetal Heart Rate: 146  Movement: Present  Urine Albumin/Glucose  Urine Albumin: 3+  Urine Glucose: Negative  Edema  LLE Edema: None  RLE Edema: None  Facial: None  Additional Edema?: No    Prenatal Labs:  Lab Results   Component Value Date    GROUPTRH B POS 2024    HGB 11.3 (L) 2024    HCT 35.8 (L) 2024     2024    SICKLE Negative 2024    HEPBSAG Non-Reactive 2024    [id number] Non-Reactive 2024    LABNGO Negative 2024    LABURIN Skin/Urogenital Michell Isolated, no further workup. 2024    [id number] Positive (A) 2024       A: 30w0d           ICD-10-CM ICD-9-CM    1. 30 weeks gestation of pregnancy  Z3A.30 V22.2 POCT URINALYSIS      2. Urinary tract infection without hematuria, site unspecified  N39.0 599.0 nitrofurantoin, macrocrystal-monohydrate, (MACROBID) 100 MG capsule      Urine culture      3. Gestational proteinuria in third trimester  O12.13 646.23      791.0           P: Bleeding, daily fetal kick counts, and  labor/labor precautions discussed.    The following were addressed during this visit:    29-32 Weeks  - Tdap Given   - Contraception/Tubal Consent   - Pre-registration   - Circumsision plans   - Op note review/ consent   - Birth Plan   - Pediatrician   - Fetal Kick Counts/PIH/PTL precautions   - Preeclampsia Education   - Quiet time       Questions answered to desired level of satisfaction  Verbalized understanding to all information and instructions provided.  Follow up in about 2 weeks (around 2024), or if symptoms worsen or fail to improve, for CLEVE, Ultrasound.    Marissa Hutchins DNP,  CNM, NP-BC

## 2024-09-09 ENCOUNTER — PROCEDURE VISIT (OUTPATIENT)
Dept: OBSTETRICS AND GYNECOLOGY | Facility: CLINIC | Age: 29
End: 2024-09-09
Payer: MEDICAID

## 2024-09-09 ENCOUNTER — ROUTINE PRENATAL (OUTPATIENT)
Dept: OBSTETRICS AND GYNECOLOGY | Facility: CLINIC | Age: 29
End: 2024-09-09
Payer: MEDICAID

## 2024-09-09 VITALS
SYSTOLIC BLOOD PRESSURE: 129 MMHG | HEART RATE: 97 BPM | BODY MASS INDEX: 27.92 KG/M2 | DIASTOLIC BLOOD PRESSURE: 87 MMHG | WEIGHT: 167.81 LBS

## 2024-09-09 DIAGNOSIS — O36.8130 DECREASED FETAL MOVEMENTS IN THIRD TRIMESTER, SINGLE OR UNSPECIFIED FETUS: ICD-10-CM

## 2024-09-09 DIAGNOSIS — Z3A.32 32 WEEKS GESTATION OF PREGNANCY: Primary | ICD-10-CM

## 2024-09-09 DIAGNOSIS — N89.8 VAGINAL DISCHARGE: ICD-10-CM

## 2024-09-09 LAB
BILIRUB SERPL-MCNC: NEGATIVE MG/DL
BLOOD, POC UA: NEGATIVE
CANDIDA SPECIES: NEGATIVE
GARDNERELLA: POSITIVE
GLUCOSE UR QL STRIP: NEGATIVE
KETONES UR QL STRIP: NEGATIVE
LEUKOCYTE ESTERASE URINE, POC: NORMAL
NITRITE, POC UA: NEGATIVE
PH, POC UA: 7
PROTEIN, POC: 30
SPECIFIC GRAVITY, POC UA: 1.02
TRICHOMONAS: NEGATIVE
UROBILINOGEN, POC UA: 1

## 2024-09-09 PROCEDURE — 87510 GARDNER VAG DNA DIR PROBE: CPT | Mod: ,,, | Performed by: CLINICAL MEDICAL LABORATORY

## 2024-09-09 PROCEDURE — 59426 ANTEPARTUM CARE ONLY: CPT | Mod: TH,,, | Performed by: ADVANCED PRACTICE MIDWIFE

## 2024-09-09 PROCEDURE — 87480 CANDIDA DNA DIR PROBE: CPT | Mod: ,,, | Performed by: CLINICAL MEDICAL LABORATORY

## 2024-09-09 PROCEDURE — 87660 TRICHOMONAS VAGIN DIR PROBE: CPT | Mod: ,,, | Performed by: CLINICAL MEDICAL LABORATORY

## 2024-09-09 NOTE — PROGRESS NOTES
29 y.o. female  at 32w0d   She c/o some occasional Clayton Schumacher contractions, not continuous.  Reports good fetal movement or fluttering. Denies any vaginal bleeding, leakage of fluid, cramping, contractions, or pressure.   Total weight gain/weight loss in pregnancy: 5.352 kg (11 lb 12.8 oz)     Vitals  BP: 129/87  Pulse: 97  Weight: 76.1 kg (167 lb 12.8 oz)  Prenatal  Fundal Height (cm): 32 cm  Fetal Heart Rate: 142  Movement: Present  Urine Albumin/Glucose  Urine Albumin: Trace  Urine Glucose: Negative  Edema  LLE Edema: None  RLE Edema: None  Facial: None  Additional Edema?: No  Cervical Exam  Dilation: Closed  Effacement (%): 20  Station: -3  Presentation: Vertex  Station (Labor Curve): 8 cm  Dilation/Effacement/Station  Dilation: Closed  Effacement (%): 20  Station: -3  Thick white vaginal discharge noted    Prenatal Labs:  Lab Results   Component Value Date    GROUPTRH B POS 2024    HGB 11.3 (L) 2024    HCT 35.8 (L) 2024     2024    SICKLE Negative 2024    HEPBSAG Non-Reactive 2024    [id number] Non-Reactive 2024    LABNGO Negative 2024    LABURIN Skin/Urogenital Michell Isolated, no further workup. 2024    [id number] Positive (A) 2024       A: 32w0d           ICD-10-CM ICD-9-CM    1. 32 weeks gestation of pregnancy  Z3A.32 V22.2 POCT URINALYSIS      2. Vaginal discharge  N89.8 623.5 Bacterial Vaginosis          P: Bleeding, daily fetal kick counts, and  labor/labor precautions discussed.  F/u 1 wk with cervical length, no sex, pelvic rest  Affirm obtained today   No pregnancy checklist tasks were completed during this visit, and no tasks are pending completion.      Questions answered to desired level of satisfaction  Verbalized understanding to all information and instructions provided.  Follow up in about 1 week (around 2024), or if symptoms worsen or fail to improve, for CLEVE with US for cervical length.    Marissa Hutchins,  DNP, CNM, [facility]-BC

## 2024-09-11 ENCOUNTER — HOSPITAL ENCOUNTER (OUTPATIENT)
Dept: RADIOLOGY | Facility: HOSPITAL | Age: 29
Discharge: HOME | End: 2024-09-11
Attending: ADVANCED PRACTICE MIDWIFE
Payer: MEDICAID

## 2024-09-11 DIAGNOSIS — Z3A.32 32 WEEKS GESTATION OF PREGNANCY: ICD-10-CM

## 2024-09-12 DIAGNOSIS — B96.89 BV (BACTERIAL VAGINOSIS): Primary | ICD-10-CM

## 2024-09-12 DIAGNOSIS — N76.0 BV (BACTERIAL VAGINOSIS): Primary | ICD-10-CM

## 2024-09-12 RX ORDER — METRONIDAZOLE 500 MG/1
500 TABLET ORAL 2 TIMES DAILY
Qty: 14 TABLET | Refills: 0 | Status: SHIPPED | OUTPATIENT
Start: 2024-09-12 | End: 2024-09-19

## 2024-09-13 NOTE — PROCEDURES
OB ultrasound Note:    BPD- 33 weeks 5 day  HC-33 weeks 3 day  AC- 31 week 6 day  FL- 33 weeks 3 day    TERRY- 9.78 cm    Fetal heart rate:  142 bpm    Fetal position- vertex  Placental location- anterior    Impression-    ANABELLA October 28, 2024  Estimated gestational age 33 weeks 0 day   EFW 2001 g (4 lb 6 oz)  Pctl ( EFW) 38 th percentile      Biophysical Profile:    Fetal Movements- 2  Fetal breathing- 2  Fetal Tone- 2  Amniotic Fluid Volume- 2    Total - 8

## 2024-09-16 ENCOUNTER — ROUTINE PRENATAL (OUTPATIENT)
Dept: OBSTETRICS AND GYNECOLOGY | Facility: CLINIC | Age: 29
End: 2024-09-16
Payer: MEDICAID

## 2024-09-16 ENCOUNTER — PROCEDURE VISIT (OUTPATIENT)
Dept: OBSTETRICS AND GYNECOLOGY | Facility: CLINIC | Age: 29
End: 2024-09-16
Payer: MEDICAID

## 2024-09-16 VITALS
HEART RATE: 116 BPM | WEIGHT: 170.19 LBS | DIASTOLIC BLOOD PRESSURE: 89 MMHG | SYSTOLIC BLOOD PRESSURE: 129 MMHG | BODY MASS INDEX: 28.32 KG/M2

## 2024-09-16 DIAGNOSIS — Z3A.33 33 WEEKS GESTATION OF PREGNANCY: ICD-10-CM

## 2024-09-16 DIAGNOSIS — Z3A.32 32 WEEKS GESTATION OF PREGNANCY: Primary | ICD-10-CM

## 2024-09-16 DIAGNOSIS — N39.0 URINARY TRACT INFECTION WITHOUT HEMATURIA, SITE UNSPECIFIED: ICD-10-CM

## 2024-09-16 DIAGNOSIS — O36.8130 DECREASED FETAL MOVEMENTS IN THIRD TRIMESTER, SINGLE OR UNSPECIFIED FETUS: ICD-10-CM

## 2024-09-16 LAB
BILIRUB SERPL-MCNC: NORMAL MG/DL
BLOOD, POC UA: NEGATIVE
GLUCOSE UR QL STRIP: NEGATIVE
KETONES UR QL STRIP: 15
LEUKOCYTE ESTERASE URINE, POC: NORMAL
NITRITE, POC UA: POSITIVE
PH, POC UA: 6
PROTEIN, POC: >=300
SPECIFIC GRAVITY, POC UA: 1.02
UROBILINOGEN, POC UA: 2

## 2024-09-16 PROCEDURE — 76819 FETAL BIOPHYS PROFIL W/O NST: CPT | Mod: ,,, | Performed by: OBSTETRICS & GYNECOLOGY

## 2024-09-16 PROCEDURE — 59426 ANTEPARTUM CARE ONLY: CPT | Mod: TH,,, | Performed by: ADVANCED PRACTICE MIDWIFE

## 2024-09-16 PROCEDURE — 87086 URINE CULTURE/COLONY COUNT: CPT | Mod: ,,, | Performed by: CLINICAL MEDICAL LABORATORY

## 2024-09-16 PROCEDURE — 99499 UNLISTED E&M SERVICE: CPT | Mod: ,,, | Performed by: OBSTETRICS & GYNECOLOGY

## 2024-09-16 PROCEDURE — 76805 OB US >/= 14 WKS SNGL FETUS: CPT | Mod: ,,, | Performed by: OBSTETRICS & GYNECOLOGY

## 2024-09-16 NOTE — PROGRESS NOTES
29 y.o. female  at 33w0d   She c/o no problems  Reports good fetal movement or fluttering. Denies any vaginal bleeding, leakage of fluid, cramping, contractions, or pressure.   Total weight gain/weight loss in pregnancy: 6.441 kg (14 lb 3.2 oz)     Vitals  BP: 129/89  Pulse: (!) 116  Weight: 77.2 kg (170 lb 3.2 oz)  Prenatal  Fundal Height (cm): 34 cm  Fetal Heart Rate: 145  Movement: Present  Urine Albumin/Glucose  Urine Albumin: 3+  Urine Glucose: Negative  Edema  LLE Edema: None  RLE Edema: None  Facial: None  Additional Edema?: No    Prenatal Labs:  Lab Results   Component Value Date    GROUPTRH B POS 2024    HGB 11.3 (L) 2024    HCT 35.8 (L) 2024     2024    SICKLE Negative 2024    HEPBSAG Non-Reactive 2024    [id number] Non-Reactive 2024    LABNGO Negative 2024    LABURIN Skin/Urogenital Michell Isolated, no further workup. 2024    [id number] Positive (A) 2024       A: 33w0d           ICD-10-CM ICD-9-CM    1. 32 weeks gestation of pregnancy  Z3A.32 V22.2 POCT URINALYSIS      2. Urinary tract infection without hematuria, site unspecified  N39.0 599.0 Urine culture          P: Bleeding, daily fetal kick counts, and  labor/labor precautions discussed.    No pregnancy checklist tasks were completed during this visit, and no tasks are pending completion.      Questions answered to desired level of satisfaction  Verbalized understanding to all information and instructions provided.  Follow up in about 1 week (around 2024), or if symptoms worsen or fail to improve, for ALISE Hutchins, ISMAEL, CNM, WHNP-BC

## 2024-09-18 LAB — UA COMPLETE W REFLEX CULTURE PNL UR: NORMAL

## 2024-09-20 ENCOUNTER — HOSPITAL ENCOUNTER (INPATIENT)
Facility: HOSPITAL | Age: 29
LOS: 7 days | Discharge: HOME | End: 2024-09-27
Attending: OBSTETRICS & GYNECOLOGY | Admitting: OBSTETRICS & GYNECOLOGY
Payer: MEDICAID

## 2024-09-20 DIAGNOSIS — D64.9 NORMOCYTIC ANEMIA: ICD-10-CM

## 2024-09-20 DIAGNOSIS — O14.13 PREECLAMPSIA, SEVERE, THIRD TRIMESTER: ICD-10-CM

## 2024-09-20 DIAGNOSIS — Z3A.34 34 WEEKS GESTATION OF PREGNANCY: ICD-10-CM

## 2024-09-20 DIAGNOSIS — Z34.90 PREGNANT: ICD-10-CM

## 2024-09-20 DIAGNOSIS — R60.0 BILATERAL LOWER EXTREMITY EDEMA: ICD-10-CM

## 2024-09-20 LAB
ALBUMIN SERPL BCP-MCNC: 2.8 G/DL (ref 3.5–5)
ALBUMIN/GLOB SERPL: 0.7 {RATIO}
ALP SERPL-CCNC: 134 U/L (ref 37–98)
ALT SERPL W P-5'-P-CCNC: 15 U/L (ref 13–56)
ANION GAP SERPL CALCULATED.3IONS-SCNC: 11 MMOL/L (ref 7–16)
AST SERPL W P-5'-P-CCNC: 17 U/L (ref 15–37)
BACTERIA #/AREA URNS HPF: ABNORMAL /HPF
BASOPHILS # BLD AUTO: 0.05 K/UL (ref 0–0.2)
BASOPHILS NFR BLD AUTO: 0.7 % (ref 0–1)
BILIRUB SERPL-MCNC: 0.9 MG/DL (ref ?–1.2)
BILIRUB UR QL STRIP: NEGATIVE
BUN SERPL-MCNC: 8 MG/DL (ref 7–18)
BUN/CREAT SERPL: 10 (ref 6–20)
CALCIUM SERPL-MCNC: 8.7 MG/DL (ref 8.5–10.1)
CHLORIDE SERPL-SCNC: 107 MMOL/L (ref 98–107)
CLARITY UR: ABNORMAL
CO2 SERPL-SCNC: 23 MMOL/L (ref 21–32)
COLOR UR: YELLOW
CREAT SERPL-MCNC: 0.84 MG/DL (ref 0.55–1.02)
CREAT UR-MCNC: 120 MG/DL (ref 28–219)
DIFFERENTIAL METHOD BLD: ABNORMAL
EGFR (NO RACE VARIABLE) (RUSH/TITUS): 98 ML/MIN/1.73M2
EOSINOPHIL # BLD AUTO: 0.06 K/UL (ref 0–0.5)
EOSINOPHIL NFR BLD AUTO: 0.8 % (ref 1–4)
ERYTHROCYTE [DISTWIDTH] IN BLOOD BY AUTOMATED COUNT: 13.6 % (ref 11.5–14.5)
GLOBULIN SER-MCNC: 4 G/DL (ref 2–4)
GLUCOSE SERPL-MCNC: 63 MG/DL (ref 74–106)
GLUCOSE UR STRIP-MCNC: NORMAL MG/DL
HCT VFR BLD AUTO: 35.6 % (ref 38–47)
HGB BLD-MCNC: 11.2 G/DL (ref 12–16)
HYPOCHROMIA BLD QL SMEAR: ABNORMAL
IMM GRANULOCYTES # BLD AUTO: 0.05 K/UL (ref 0–0.04)
IMM GRANULOCYTES NFR BLD: 0.7 % (ref 0–0.4)
KETONES UR STRIP-SCNC: NEGATIVE MG/DL
LEUKOCYTE ESTERASE UR QL STRIP: ABNORMAL
LYMPHOCYTES # BLD AUTO: 1.89 K/UL (ref 1–4.8)
LYMPHOCYTES NFR BLD AUTO: 24.6 % (ref 27–41)
MCH RBC QN AUTO: 25.1 PG (ref 27–31)
MCHC RBC AUTO-ENTMCNC: 31.5 G/DL (ref 32–36)
MCV RBC AUTO: 79.6 FL (ref 80–96)
MICROCYTES BLD QL SMEAR: ABNORMAL
MONOCYTES # BLD AUTO: 0.68 K/UL (ref 0–0.8)
MONOCYTES NFR BLD AUTO: 8.9 % (ref 2–6)
MPC BLD CALC-MCNC: 13.7 FL (ref 9.4–12.4)
MUCOUS, UA: ABNORMAL /LPF
NEUTROPHILS # BLD AUTO: 4.94 K/UL (ref 1.8–7.7)
NEUTROPHILS NFR BLD AUTO: 64.3 % (ref 53–65)
NITRITE UR QL STRIP: NEGATIVE
NRBC # BLD AUTO: 0 X10E3/UL
NRBC, AUTO (.00): 0 %
OVALOCYTES BLD QL SMEAR: ABNORMAL
PH UR STRIP: 7.5 PH UNITS
PLATELET # BLD AUTO: 170 K/UL (ref 150–400)
PLATELET MORPHOLOGY: ABNORMAL
POLYCHROMASIA BLD QL SMEAR: ABNORMAL
POTASSIUM SERPL-SCNC: 3.7 MMOL/L (ref 3.5–5.1)
PROT SERPL-MCNC: 6.8 G/DL (ref 6.4–8.2)
PROT UR QL STRIP: 200
PROT UR-MCNC: 250.9 MG/DL (ref 0–11.9)
PROT/CREAT 24H UR-RTO: 2.09
RBC # BLD AUTO: 4.47 M/UL (ref 4.2–5.4)
RBC # UR STRIP: NEGATIVE /UL
RBC #/AREA URNS HPF: 3 /HPF
SODIUM SERPL-SCNC: 137 MMOL/L (ref 136–145)
SP GR UR STRIP: 1.02
SQUAMOUS #/AREA URNS LPF: ABNORMAL /HPF
URATE SERPL-MCNC: 5.8 MG/DL (ref 2.6–6)
UROBILINOGEN UR STRIP-ACNC: 4 MG/DL
WBC # BLD AUTO: 7.67 K/UL (ref 4.5–11)
WBC #/AREA URNS HPF: 14 /HPF

## 2024-09-20 PROCEDURE — 80053 COMPREHEN METABOLIC PANEL: CPT | Performed by: OBSTETRICS & GYNECOLOGY

## 2024-09-20 PROCEDURE — 81003 URINALYSIS AUTO W/O SCOPE: CPT | Performed by: OBSTETRICS & GYNECOLOGY

## 2024-09-20 PROCEDURE — 36415 COLL VENOUS BLD VENIPUNCTURE: CPT | Performed by: OBSTETRICS & GYNECOLOGY

## 2024-09-20 PROCEDURE — 85025 COMPLETE CBC W/AUTO DIFF WBC: CPT | Performed by: OBSTETRICS & GYNECOLOGY

## 2024-09-20 PROCEDURE — 81001 URINALYSIS AUTO W/SCOPE: CPT | Performed by: OBSTETRICS & GYNECOLOGY

## 2024-09-20 PROCEDURE — 99285 EMERGENCY DEPT VISIT HI MDM: CPT

## 2024-09-20 PROCEDURE — 84156 ASSAY OF PROTEIN URINE: CPT | Performed by: OBSTETRICS & GYNECOLOGY

## 2024-09-20 PROCEDURE — 63600175 PHARM REV CODE 636 W HCPCS: Performed by: OBSTETRICS & GYNECOLOGY

## 2024-09-20 PROCEDURE — 25000003 PHARM REV CODE 250: Performed by: OBSTETRICS & GYNECOLOGY

## 2024-09-20 PROCEDURE — 84550 ASSAY OF BLOOD/URIC ACID: CPT | Performed by: OBSTETRICS & GYNECOLOGY

## 2024-09-20 PROCEDURE — 11000001 HC ACUTE MED/SURG PRIVATE ROOM

## 2024-09-20 PROCEDURE — 87086 URINE CULTURE/COLONY COUNT: CPT | Performed by: OBSTETRICS & GYNECOLOGY

## 2024-09-20 RX ORDER — MAGNESIUM SULFATE HEPTAHYDRATE 40 MG/ML
4 INJECTION, SOLUTION INTRAVENOUS ONCE
Status: DISCONTINUED | OUTPATIENT
Start: 2024-09-20 | End: 2024-09-20

## 2024-09-20 RX ORDER — LABETALOL HYDROCHLORIDE 5 MG/ML
80 INJECTION, SOLUTION INTRAVENOUS ONCE AS NEEDED
Status: DISCONTINUED | OUTPATIENT
Start: 2024-09-20 | End: 2024-09-27 | Stop reason: HOSPADM

## 2024-09-20 RX ORDER — MAGNESIUM SULFATE HEPTAHYDRATE 40 MG/ML
1 INJECTION, SOLUTION INTRAVENOUS CONTINUOUS
Status: DISCONTINUED | OUTPATIENT
Start: 2024-09-20 | End: 2024-09-23

## 2024-09-20 RX ORDER — PRENATAL WITH FERROUS FUM AND FOLIC ACID 3080; 920; 120; 400; 22; 1.84; 3; 20; 10; 1; 12; 200; 27; 25; 2 [IU]/1; [IU]/1; MG/1; [IU]/1; MG/1; MG/1; MG/1; MG/1; MG/1; MG/1; UG/1; MG/1; MG/1; MG/1; MG/1
1 TABLET ORAL DAILY
Status: DISCONTINUED | OUTPATIENT
Start: 2024-09-21 | End: 2024-09-24

## 2024-09-20 RX ORDER — MEPERIDINE HYDROCHLORIDE 25 MG/ML
50 INJECTION INTRAMUSCULAR; INTRAVENOUS; SUBCUTANEOUS ONCE
Status: COMPLETED | OUTPATIENT
Start: 2024-09-20 | End: 2024-09-20

## 2024-09-20 RX ORDER — ACETAMINOPHEN 325 MG/1
650 TABLET ORAL EVERY 6 HOURS PRN
Status: DISCONTINUED | OUTPATIENT
Start: 2024-09-20 | End: 2024-09-24

## 2024-09-20 RX ORDER — MAGNESIUM SULFATE HEPTAHYDRATE 40 MG/ML
4 INJECTION, SOLUTION INTRAVENOUS ONCE
Status: COMPLETED | OUTPATIENT
Start: 2024-09-20 | End: 2024-09-20

## 2024-09-20 RX ORDER — SODIUM CHLORIDE, SODIUM LACTATE, POTASSIUM CHLORIDE, CALCIUM CHLORIDE 600; 310; 30; 20 MG/100ML; MG/100ML; MG/100ML; MG/100ML
INJECTION, SOLUTION INTRAVENOUS CONTINUOUS
Status: DISCONTINUED | OUTPATIENT
Start: 2024-09-20 | End: 2024-09-20

## 2024-09-20 RX ORDER — ONDANSETRON 4 MG/1
8 TABLET, ORALLY DISINTEGRATING ORAL EVERY 8 HOURS PRN
Status: DISCONTINUED | OUTPATIENT
Start: 2024-09-20 | End: 2024-09-24

## 2024-09-20 RX ORDER — BETAMETHASONE SODIUM PHOSPHATE AND BETAMETHASONE ACETATE 3; 3 MG/ML; MG/ML
12 INJECTION, SUSPENSION INTRA-ARTICULAR; INTRALESIONAL; INTRAMUSCULAR; SOFT TISSUE EVERY 24 HOURS
Status: DISCONTINUED | OUTPATIENT
Start: 2024-09-20 | End: 2024-09-21

## 2024-09-20 RX ORDER — SODIUM CHLORIDE, SODIUM LACTATE, POTASSIUM CHLORIDE, CALCIUM CHLORIDE 600; 310; 30; 20 MG/100ML; MG/100ML; MG/100ML; MG/100ML
INJECTION, SOLUTION INTRAVENOUS CONTINUOUS
Status: DISCONTINUED | OUTPATIENT
Start: 2024-09-20 | End: 2024-09-27

## 2024-09-20 RX ORDER — SIMETHICONE 80 MG
1 TABLET,CHEWABLE ORAL EVERY 6 HOURS PRN
Status: DISCONTINUED | OUTPATIENT
Start: 2024-09-20 | End: 2024-09-24

## 2024-09-20 RX ORDER — AMOXICILLIN 250 MG
1 CAPSULE ORAL NIGHTLY PRN
Status: DISCONTINUED | OUTPATIENT
Start: 2024-09-20 | End: 2024-09-24

## 2024-09-20 RX ORDER — HYDRALAZINE HYDROCHLORIDE 20 MG/ML
10 INJECTION INTRAMUSCULAR; INTRAVENOUS ONCE AS NEEDED
Status: DISCONTINUED | OUTPATIENT
Start: 2024-09-20 | End: 2024-09-27 | Stop reason: HOSPADM

## 2024-09-20 RX ORDER — DIPHENHYDRAMINE HYDROCHLORIDE 50 MG/ML
25 INJECTION INTRAMUSCULAR; INTRAVENOUS EVERY 4 HOURS PRN
Status: DISCONTINUED | OUTPATIENT
Start: 2024-09-20 | End: 2024-09-27 | Stop reason: HOSPADM

## 2024-09-20 RX ORDER — MUPIROCIN 20 MG/G
OINTMENT TOPICAL 2 TIMES DAILY
Status: CANCELLED | OUTPATIENT
Start: 2024-09-20 | End: 2024-09-25

## 2024-09-20 RX ORDER — DIPHENHYDRAMINE HCL 25 MG
25 CAPSULE ORAL EVERY 4 HOURS PRN
Status: DISCONTINUED | OUTPATIENT
Start: 2024-09-20 | End: 2024-09-27 | Stop reason: HOSPADM

## 2024-09-20 RX ORDER — CALCIUM GLUCONATE 98 MG/ML
1 INJECTION, SOLUTION INTRAVENOUS
Status: DISCONTINUED | OUTPATIENT
Start: 2024-09-20 | End: 2024-09-27 | Stop reason: HOSPADM

## 2024-09-20 RX ORDER — BUTALBITAL, ACETAMINOPHEN AND CAFFEINE 50; 325; 40 MG/1; MG/1; MG/1
2 TABLET ORAL ONCE
Status: COMPLETED | OUTPATIENT
Start: 2024-09-20 | End: 2024-09-20

## 2024-09-20 RX ORDER — BETAMETHASONE SODIUM PHOSPHATE AND BETAMETHASONE ACETATE 3; 3 MG/ML; MG/ML
12 INJECTION, SUSPENSION INTRA-ARTICULAR; INTRALESIONAL; INTRAMUSCULAR; SOFT TISSUE EVERY 24 HOURS
Status: DISCONTINUED | OUTPATIENT
Start: 2024-09-21 | End: 2024-09-20

## 2024-09-20 RX ORDER — LABETALOL HYDROCHLORIDE 5 MG/ML
40 INJECTION, SOLUTION INTRAVENOUS ONCE AS NEEDED
Status: DISCONTINUED | OUTPATIENT
Start: 2024-09-20 | End: 2024-09-27 | Stop reason: HOSPADM

## 2024-09-20 RX ORDER — NIFEDIPINE 10 MG/1
10 CAPSULE ORAL ONCE
Status: COMPLETED | OUTPATIENT
Start: 2024-09-20 | End: 2024-09-20

## 2024-09-20 RX ORDER — LABETALOL HYDROCHLORIDE 5 MG/ML
20 INJECTION, SOLUTION INTRAVENOUS ONCE AS NEEDED
Status: COMPLETED | OUTPATIENT
Start: 2024-09-20 | End: 2024-09-24

## 2024-09-20 RX ORDER — SODIUM CHLORIDE 0.9 % (FLUSH) 0.9 %
10 SYRINGE (ML) INJECTION
Status: DISCONTINUED | OUTPATIENT
Start: 2024-09-20 | End: 2024-09-27 | Stop reason: HOSPADM

## 2024-09-20 RX ADMIN — NIFEDIPINE 10 MG: 10 CAPSULE ORAL at 04:09

## 2024-09-20 RX ADMIN — BUTALBITAL, ACETAMINOPHEN, AND CAFFEINE 2 TABLET: 50; 325; 40 TABLET ORAL at 04:09

## 2024-09-20 RX ADMIN — MAGNESIUM SULFATE HEPTAHYDRATE 4 G: 40 INJECTION, SOLUTION INTRAVENOUS at 05:09

## 2024-09-20 RX ADMIN — MEPERIDINE HYDROCHLORIDE 50 MG: 25 INJECTION INTRAMUSCULAR; INTRAVENOUS; SUBCUTANEOUS at 09:09

## 2024-09-20 RX ADMIN — MAGNESIUM SULFATE HEPTAHYDRATE 2 G/HR: 40 INJECTION, SOLUTION INTRAVENOUS at 06:09

## 2024-09-20 RX ADMIN — BETAMETHASONE SODIUM PHOSPHATE AND BETAMETHASONE ACETATE 12 MG: 3; 3 INJECTION, SUSPENSION INTRA-ARTICULAR; INTRALESIONAL; INTRAMUSCULAR at 06:09

## 2024-09-20 RX ADMIN — SODIUM CHLORIDE, POTASSIUM CHLORIDE, SODIUM LACTATE AND CALCIUM CHLORIDE 75 ML/HR: 600; 310; 30; 20 INJECTION, SOLUTION INTRAVENOUS at 05:09

## 2024-09-20 NOTE — H&P
[facility] -  Labor and Delivery  Obstetrics  History & Physical    Patient Name: Alana Hdez  MRN: [id number]  Admission Date: 2024  Primary Care Provider: Arcelia, Primary Doctor    Subjective:     Principal Problem:severe HA and dizzyness    History of Present Illness: 28 Y/O  @ 33+4 weeks admitted for pre-ecclampsia with severe features. She presented with a headache that started in the am and worsened as the day went along. She also presented with treatable BP's . No hx of HTN eventhough she was told in the office to monitor her BP's. She has been on bedrest for shortened cervix. Denies RUQ pain, vaginal bleeding, n/v, LOF. Last baby at 40 weeks and had to be induced.     Obstetric HPI:  Patient reports Frequency: Every 3 minutes contractions, active fetal movement, No vaginal bleeding , No loss of fluid     This pregnancy has been complicated by shortened cervix    OB History    Para Term  AB Living   2 1 1 0 0 1   SAB IAB Ectopic Multiple Live Births   0 0 0 0 1      # Outcome Date GA Lbr Chilo/2nd Weight Sex Type Anes PTL Lv   2 Current            1 Term 10/18/17 40w0d  4.309 kg (9 lb 8 oz) M Vag-Spont OTHER N EULA     Past Medical History:   Diagnosis Date    ANGELINE (generalized anxiety disorder) 2023     No past surgical history on file.    PTA Medications   Medication Sig    ergocalciferol (ERGOCALCIFEROL) 50,000 unit Cap Take 50,000 Units by mouth every 7 days. (Patient not taking: Reported on 2024)    [] metroNIDAZOLE (FLAGYL) 500 MG tablet Take 1 tablet (500 mg total) by mouth 2 (two) times daily. for 7 days    PNV,calcium 72-iron-folic acid (PRENATAL VITAMIN PLUS LOW IRON) 27 mg iron- 1 mg Tab Take 1 tablet (1 each total) by mouth once daily.       Review of patient's allergies indicates:  No Known Allergies     Family History    None       Tobacco Use    Smoking status: Never     Passive exposure: Never    Smokeless tobacco: Never   Substance and  Sexual Activity    Alcohol use: Not Currently    Drug use: Never    Sexual activity: Yes     Partners: Male     Review of Systems   Constitutional: Negative.    HENT: Negative.     Respiratory: Negative.     Cardiovascular: Negative.    Gastrointestinal: Negative.    Genitourinary: Negative.  Negative for vaginal bleeding.   Musculoskeletal: Negative.    Neurological:  Positive for headaches.   Psychiatric/Behavioral: Negative.        Objective:     Vital Signs (Most Recent):  Temp: 98 °F (36.7 °C) (24 1604)  Pulse: 92 (24 1703)  Resp: 17 (24 1604)  BP: (!) 135/90 (24 1703)  SpO2: 99 % (24 1604) Vital Signs (24h Range):  Temp:  [98 °F (36.7 °C)] 98 °F (36.7 °C)  Pulse:  [] 92  Resp:  [17] 17  SpO2:  [99 %] 99 %  BP: (135-193)/() 135/90     Weight: 73.5 kg (162 lb)  Body mass index is 26.96 kg/m².    FHT: 130 BPM Cat 1 (reassuring)  TOCO:  Q 3 minutes    Physical Exam:   Constitutional: She is oriented to person, place, and time. She appears well-developed and well-nourished. No distress.    HENT:   Head: Normocephalic and atraumatic.      Cardiovascular:  Normal rate and regular rhythm.             Pulmonary/Chest: Effort normal. No respiratory distress.        Abdominal: Soft. There is no abdominal tenderness.   No fundal or RUQ TTP             Musculoskeletal:      Comments: +1  pedal edema       Neurological: She is alert and oriented to person, place, and time.     Psychiatric: She has a normal mood and affect.       Cervix:  Dilation:  1  Effacement:  75%  Station: -2  Presentation: Vertex     Significant Labs:  Lab Results   Component Value Date    GROUPTRH B POS 2024    HEPBSAG Non-Reactive 2024       I have personallly reviewed all pertinent lab results from the last 24 hours.  Recent Lab Results       None            Assessment/Plan:     29 y.o. female  at 33w4d for:    Pre-ecclampsia with severe feature  33+4 weeks EGA  Cath 1 NST  Start MagSO4.  Procardia x 1 given po prior to starting IV.   Steroids x 2 doses  Hypertensive protocol when needed        Fani Mitchell MD  Obstetrics  [facility] -  Labor and Delivery

## 2024-09-20 NOTE — ED PROVIDER NOTES
Encounter Date: 2024    DELBERT Physician: Fani Mitchell   Primary OBGYN:Marissa Hutchins CNM    Admit Diagnosis/Chief Complaint:  Headache and dizzyness.  History     Chief Complaint   Patient presents with    Headache    Dizziness     30 Y/O  @ 33+4 weeks, ANABELLA 2024, presents to OB ED with c/o dizzyness and severe headache since this am. She states on her last visit she was told to keep an eye on her BP's and when she took her BP this am it was 200/111 and she thought it was wrong so she came here for a recheck. She is on bedrest for a short cervix. Here in OB ED she was noted to have treatable BP's of 190's/112. She denies n/v, RUQ pain, vaginal bleeding, LOF. Delivered her last baby at 40 weeks and had to be induced. No hx of HTN. The father of the baby is different this time.     Headache   Associated symptoms include dizziness.   Dizziness:    Associated symptoms: headaches.    Obstetric HPI:  Patient reports None contractions, active fetal movement, absent vaginal bleeding , absent loss of fluid      Objective:     Vital Signs (Most Recent):  Temp: 98 °F (36.7 °C) (24 160)  Pulse: 92 (24 1703)  Resp: 17 (24 160)  BP: (!) 135/90 (24 1703)  SpO2: 99 % (24 160) Vital Signs (24h Range):  Temp:  [98 °F (36.7 °C)] 98 °F (36.7 °C)  Pulse:  [] 92  Resp:  [17] 17  SpO2:  [99 %] 99 %  BP: (135-193)/() 135/90     Weight: 73.5 kg (162 lb)  Body mass index is 26.96 kg/m².    FHT: 130 BPMCat 1 (reassuring)  TOCO:  Q 3 minutes. Patient does not feel them    No intake or output data in the 24 hours ending 24 170    Cervical Exam:  Dilation:  1  Effacement:  75%  Station: -2  Presentation: Vertex     Significant Labs:  Recent Lab Results       None          Review of patient's allergies indicates:  No Known Allergies  Past Medical History:   Diagnosis Date    ANGELINE (generalized anxiety disorder) 2023     No past surgical history on file.  No family history on  file.  Social History     Tobacco Use    Smoking status: Never     Passive exposure: Never    Smokeless tobacco: Never   Substance Use Topics    Alcohol use: Not Currently    Drug use: Never     Review of Systems   Constitutional: Negative.    HENT: Negative.     Eyes: Negative.    Respiratory: Negative.     Cardiovascular: Negative.    Gastrointestinal: Negative.    Genitourinary: Negative.  Negative for vaginal bleeding and vaginal discharge.   Neurological:  Positive for dizziness and headaches.   Psychiatric/Behavioral: Negative.         Physical Exam     Initial Vitals [09/20/24 1604]   BP Pulse Resp Temp SpO2   (!) 193/114 71 17 98 °F (36.7 °C) 99 %      MAP       --         Physical Exam    Constitutional: She appears well-developed and well-nourished. No distress.   HENT:   Head: Normocephalic and atraumatic.   Neck:   Normal range of motion.  Cardiovascular:  Normal rate and regular rhythm.           Pulmonary/Chest: No respiratory distress.   Abdominal: Abdomen is soft. She exhibits no distension. There is no abdominal tenderness. There is no rebound.   Musculoskeletal:         General: Normal range of motion.      Cervical back: Normal range of motion.     Psychiatric: She has a normal mood and affect.         ED Course     Labs Reviewed   CBC W/ AUTO DIFFERENTIAL    Narrative:     The following orders were created for panel order CBC Auto Differential.  Procedure                               Abnormality         Status                     ---------                               -----------         ------                     CBC with Differential[[id number]]                           In process                   Please view results for these tests on the individual orders.   COMPREHENSIVE METABOLIC PANEL   URIC ACID   PROTEIN / CREATININE RATIO, URINE   URINALYSIS, REFLEX TO URINE CULTURE   CBC WITH DIFFERENTIAL        Imaging Results    None          Medical Decision Making  33+4 weeks  EGA  Pre-ecclampsia with severe features  Cath 1 NST       Medications   NIFEdipine capsule 10 mg (10 mg Oral Given 9/20/24 1614)   butalbital-acetaminophen-caffeine -40 mg per tablet 2 tablet (2 tablets Oral Given 9/20/24 1633)                              Clinical Impression:    Admit for pre-ecclampsia with severe features  Cath 1 NST  Dr. Monroy notified  Start magnesium sulfate and hypertensive protocol

## 2024-09-21 PROBLEM — O14.13 PREECLAMPSIA, SEVERE, THIRD TRIMESTER: Status: ACTIVE | Noted: 2024-09-21

## 2024-09-21 PROBLEM — Z3A.33 33 WEEKS GESTATION OF PREGNANCY: Status: ACTIVE | Noted: 2024-09-21

## 2024-09-21 LAB
ALBUMIN SERPL BCP-MCNC: 2.7 G/DL (ref 3.5–5)
ALBUMIN/GLOB SERPL: 0.7 {RATIO}
ALP SERPL-CCNC: 123 U/L (ref 37–98)
ALT SERPL W P-5'-P-CCNC: 16 U/L (ref 13–56)
ANION GAP SERPL CALCULATED.3IONS-SCNC: 18 MMOL/L (ref 7–16)
AST SERPL W P-5'-P-CCNC: 20 U/L (ref 15–37)
BASOPHILS # BLD AUTO: 0.01 K/UL (ref 0–0.2)
BASOPHILS NFR BLD AUTO: 0.1 % (ref 0–1)
BILIRUB SERPL-MCNC: 0.9 MG/DL (ref ?–1.2)
BUN SERPL-MCNC: 10 MG/DL (ref 7–18)
BUN/CREAT SERPL: 12 (ref 6–20)
CALCIUM SERPL-MCNC: 7.9 MG/DL (ref 8.5–10.1)
CHLORIDE SERPL-SCNC: 102 MMOL/L (ref 98–107)
CO2 SERPL-SCNC: 18 MMOL/L (ref 21–32)
CREAT SERPL-MCNC: 0.85 MG/DL (ref 0.55–1.02)
CRENATED CELLS: ABNORMAL
DIFFERENTIAL METHOD BLD: ABNORMAL
EGFR (NO RACE VARIABLE) (RUSH/TITUS): 96 ML/MIN/1.73M2
EOSINOPHIL # BLD AUTO: 0 K/UL (ref 0–0.5)
EOSINOPHIL NFR BLD AUTO: 0 % (ref 1–4)
ERYTHROCYTE [DISTWIDTH] IN BLOOD BY AUTOMATED COUNT: 14 % (ref 11.5–14.5)
GLOBULIN SER-MCNC: 4.1 G/DL (ref 2–4)
GLUCOSE SERPL-MCNC: 78 MG/DL (ref 74–106)
HCT VFR BLD AUTO: 36.8 % (ref 38–47)
HGB BLD-MCNC: 11.3 G/DL (ref 12–16)
IMM GRANULOCYTES # BLD AUTO: 0.09 K/UL (ref 0–0.04)
IMM GRANULOCYTES NFR BLD: 0.7 % (ref 0–0.4)
LYMPHOCYTES # BLD AUTO: 1.19 K/UL (ref 1–4.8)
LYMPHOCYTES NFR BLD AUTO: 9.5 % (ref 27–41)
MAGNESIUM SERPL-MCNC: 6.6 MG/DL (ref 1.7–2.3)
MAGNESIUM SERPL-MCNC: 8.1 MG/DL (ref 1.7–2.3)
MCH RBC QN AUTO: 25.1 PG (ref 27–31)
MCHC RBC AUTO-ENTMCNC: 30.7 G/DL (ref 32–36)
MCV RBC AUTO: 81.6 FL (ref 80–96)
MICROCYTES BLD QL SMEAR: ABNORMAL
MONOCYTES # BLD AUTO: 0.5 K/UL (ref 0–0.8)
MONOCYTES NFR BLD AUTO: 4 % (ref 2–6)
MPC BLD CALC-MCNC: 13.7 FL (ref 9.4–12.4)
NEUTROPHILS # BLD AUTO: 10.73 K/UL (ref 1.8–7.7)
NEUTROPHILS NFR BLD AUTO: 85.7 % (ref 53–65)
NRBC # BLD AUTO: 0 X10E3/UL
NRBC, AUTO (.00): 0 %
OVALOCYTES BLD QL SMEAR: ABNORMAL
PLATELET # BLD AUTO: 181 K/UL (ref 150–400)
PLATELET MORPHOLOGY: ABNORMAL
POLYCHROMASIA BLD QL SMEAR: ABNORMAL
POTASSIUM SERPL-SCNC: 4.3 MMOL/L (ref 3.5–5.1)
PROT SERPL-MCNC: 6.8 G/DL (ref 6.4–8.2)
RBC # BLD AUTO: 4.51 M/UL (ref 4.2–5.4)
SODIUM SERPL-SCNC: 134 MMOL/L (ref 136–145)
WBC # BLD AUTO: 12.52 K/UL (ref 4.5–11)

## 2024-09-21 PROCEDURE — 36415 COLL VENOUS BLD VENIPUNCTURE: CPT | Performed by: OBSTETRICS & GYNECOLOGY

## 2024-09-21 PROCEDURE — 25000003 PHARM REV CODE 250: Performed by: OBSTETRICS & GYNECOLOGY

## 2024-09-21 PROCEDURE — 83735 ASSAY OF MAGNESIUM: CPT | Performed by: OBSTETRICS & GYNECOLOGY

## 2024-09-21 PROCEDURE — 11000001 HC ACUTE MED/SURG PRIVATE ROOM

## 2024-09-21 PROCEDURE — 80053 COMPREHEN METABOLIC PANEL: CPT | Performed by: OBSTETRICS & GYNECOLOGY

## 2024-09-21 PROCEDURE — 63600175 PHARM REV CODE 636 W HCPCS: Performed by: OBSTETRICS & GYNECOLOGY

## 2024-09-21 PROCEDURE — 85025 COMPLETE CBC W/AUTO DIFF WBC: CPT | Performed by: OBSTETRICS & GYNECOLOGY

## 2024-09-21 RX ORDER — FAMOTIDINE 10 MG/ML
20 INJECTION INTRAVENOUS 2 TIMES DAILY
Status: DISCONTINUED | OUTPATIENT
Start: 2024-09-21 | End: 2024-09-24

## 2024-09-21 RX ORDER — BETAMETHASONE SODIUM PHOSPHATE AND BETAMETHASONE ACETATE 3; 3 MG/ML; MG/ML
12 INJECTION, SUSPENSION INTRA-ARTICULAR; INTRALESIONAL; INTRAMUSCULAR; SOFT TISSUE ONCE
Status: COMPLETED | OUTPATIENT
Start: 2024-09-21 | End: 2024-09-21

## 2024-09-21 RX ORDER — ONDANSETRON HYDROCHLORIDE 2 MG/ML
4 INJECTION, SOLUTION INTRAVENOUS EVERY 6 HOURS PRN
Status: DISCONTINUED | OUTPATIENT
Start: 2024-09-21 | End: 2024-09-24

## 2024-09-21 RX ADMIN — MAGNESIUM SULFATE HEPTAHYDRATE 2 G/HR: 40 INJECTION, SOLUTION INTRAVENOUS at 02:09

## 2024-09-21 RX ADMIN — BETAMETHASONE SODIUM PHOSPHATE AND BETAMETHASONE ACETATE 12 MG: 3; 3 INJECTION, SUSPENSION INTRA-ARTICULAR; INTRALESIONAL; INTRAMUSCULAR at 06:09

## 2024-09-21 RX ADMIN — SODIUM CHLORIDE, POTASSIUM CHLORIDE, SODIUM LACTATE AND CALCIUM CHLORIDE: 600; 310; 30; 20 INJECTION, SOLUTION INTRAVENOUS at 08:09

## 2024-09-21 RX ADMIN — FAMOTIDINE 20 MG: 10 INJECTION, SOLUTION INTRAVENOUS at 08:09

## 2024-09-21 RX ADMIN — ONDANSETRON 4 MG: 2 INJECTION INTRAMUSCULAR; INTRAVENOUS at 02:09

## 2024-09-21 RX ADMIN — SODIUM CHLORIDE, POTASSIUM CHLORIDE, SODIUM LACTATE AND CALCIUM CHLORIDE: 600; 310; 30; 20 INJECTION, SOLUTION INTRAVENOUS at 09:09

## 2024-09-21 NOTE — ASSESSMENT & PLAN NOTE
33w5d     B-positive  Rubella immune  GBS unknown - will obtain GBS intrapartum swab    Cervix 1/80/-2 on admission - 1/80/-2 this am (checked due to patient complaints of pain  - likely was bladder spasm with gonzalez)

## 2024-09-21 NOTE — PROGRESS NOTES
[facility] -  Labor and Delivery  Obstetrics  Antepartum Progress Note    Patient Name: Alana Hdez  MRN: [id number]  Admission Date: 2024  Hospital Length of Stay: 1 days  Attending Physician: Fani Mitchell MD  Primary Care Provider: No, Primary Doctor    Subjective:     Principal Problem:Preeclampsia, severe, third trimester    HPI:  No notes on file  See H&P    Hospital Course:    HD#2    at 33w5d admitted  with Preeclampsia with severe features.    Patient is s/p BMZ#1 at 1800hrs  (dose #2 due thgis evening at 1800)  On Magnesium sulfate IV for seizure prophylaxis    Reports some heartburn - Pepcid IV 20mg BID ordered    Denies VB, LOF or rhythmic Ctx  Reports fetal movement    Does report some intermittent headache - none at time of evaluation  Denies blurry vision or RUQ/epigastric pain    Obstetric HPI:  Patient reports irregular, most not palpated contractions, active fetal movement, absent vaginal bleeding , absent loss of fluid      Objective:     Vital Signs (Most Recent):  Temp: 98.2 °F (36.8 °C) (24 0005)  Pulse: 93 (2445)  Resp: 18 (24)  BP: 119/85 (24)  SpO2: 100 % (2445) Vital Signs (24h Range):  Temp:  [98 °F (36.7 °C)-98.2 °F (36.8 °C)] 98.2 °F (36.8 °C)  Pulse:  [] 93  Resp:  [17-19] 18  SpO2:  [90 %-100 %] 100 %  BP: (113-193)/() 119/85     Vitals:    24 0548 24 0603 24 0618 24 06   BP: 129/86 131/87 139/89 (!) 135/90    24 0649 24 0704 24 0718 24 0733   BP: 117/77 118/67 121/70 117/70    24 0833 24 09   BP: 113/75 119/85           Weight: 73.5 kg (162 lb)  Body mass index is 26.96 kg/m².    FHT: 120 Cat 1 (reassuring)  TOCO:  Q irregular minutes      Intake/Output Summary (Last 24 hours) at 2024 1123  Last data filed at 2024 1055  Gross per 24 hour   Intake --   Output 1150 ml   Net -1150 ml       Cervical Exam:  Dilation:   1  Effacement:  80  Station: -2  Presentation: Vertex     Significant Labs:  Recent Lab Results         09/21/24  0439   09/20/24  1631   09/20/24  1623        Albumin/Globulin Ratio   0.7         Albumin   2.8         ALP   134         ALT   15         Anion Gap   11         Appearance, UA     Turbid       AST   17         Bacteria, UA     Many       Baso #   0.05         Basophil %   0.7         Bilirubin (UA)     Negative       BILIRUBIN TOTAL   0.9         BUN   8         BUN/CREAT RATIO   10         Calcium   8.7         Chloride   107         CO2   23         Color, UA     Yellow       Creatinine   0.84         Urine Creatinine     120       Differential Method   Auto         eGFR   98         Eos #   0.06         Eos %   0.8         Globulin, Total   4.0         Glucose   63         Glucose, UA     Normal       Hematocrit   35.6         Hemoglobin   11.2         Hypo   Few         Immature Grans (Abs)   0.05         Immature Granulocytes   0.7         Ketones, UA     Negative       Leukocyte Esterase, UA     Moderate       Lymph #   1.89         Lymph %   24.6         Magnesium  6.6           MCH   25.1         MCHC   31.5         MCV   79.6         Microcytosis   Few         Mono #   0.68         Mono %   8.9         MPV   13.7         Mucous     Occasional       Neutrophils, Abs   4.94         Neutrophils Relative   64.3         NITRITE UA     Negative       nRBC   0.0         NUCLEATED RBC ABSOLUTE   0.00         Blood, UA     Negative       Ovalocytes   Few         pH, UA     7.5       PLATELET MORPHOLOGY   Few Large Platelets         Platelet Count   170         Poly   Few         Potassium   3.7         PROTEIN TOTAL   6.8         Protein, UA     200       Urine Protein     250.9       Protein/Creat Ratio     2.091       RBC   4.47         RBC, UA     3       RDW   13.6         Sodium   137         Spec Grav UA     1.016       Squamous Epithelial Cells, UA     Many       Uric Acid   5.8         Urine  Culture     No Growth To Date  [P]       UROBILINOGEN UA     4       WBC, UA     14       WBC   7.67                  [P] - Preliminary Result               Physical Exam:   Constitutional: She is oriented to person, place, and time. She appears well-developed and well-nourished.    HENT:   Head: Normocephalic and atraumatic.    Eyes: Pupils are equal, round, and reactive to light.     Cardiovascular:  Normal rate and regular rhythm.      Exam reveals no edema.        Pulmonary/Chest: Effort normal and breath sounds normal. No respiratory distress.        Abdominal: Soft. Bowel sounds are normal. She exhibits no distension and no mass. There is no abdominal tenderness. There is no rebound and no guarding.   Gravid, soft - uterus nontender     Genitourinary:    Pelvic exam was performed with patient supine.   The external female genitalia was normal.   No external genitalia lesions identified,Genitalia hair distrobution normal .             Musculoskeletal: Normal range of motion.       Neurological: She is alert and oriented to person, place, and time. She has normal reflexes. She displays normal reflexes. She exhibits normal muscle tone.    Skin: Skin is warm and dry. No rash noted. No erythema.    Psychiatric: She has a normal mood and affect. Her behavior is normal.       Review of Systems   Constitutional:  Negative for chills and fever.   Eyes:  Negative for visual disturbance.   Respiratory:  Negative for cough and shortness of breath.    Cardiovascular:  Negative for chest pain, palpitations and leg swelling.   Gastrointestinal:  Positive for reflux (added pepcid). Negative for abdominal pain, blood in stool, constipation, diarrhea, nausea and vomiting.   Genitourinary:  Negative for dysmenorrhea, dyspareunia, dysuria, flank pain, frequency, genital sores, hematuria, pelvic pain, urgency, vaginal bleeding, vaginal discharge, vaginal pain and vaginal odor.   Musculoskeletal:  Negative for back pain.    Neurological:  Positive for headaches (intermittent). Negative for vertigo, seizures, syncope and numbness.   Psychiatric/Behavioral:  Negative for depression. The patient is not nervous/anxious.      Assessment/Plan:     29 y.o. female  at 33w5d for:    * Preeclampsia, severe, third trimester    Continue Magnesium Sulfate for seizure prophylaxis  2nd dose Betamethasone at 1800 hrs    Not on antihypertensives at this time  Monitor BP closely    Patient with multiple complaints about her gonzalez - will trial removal and use bed pan. Should pressures worsen or patient be unable to use bed pan / or we cannot obtain accurate output then gonzalez will be replaced.    As BP is stable at this time and patient is stable will allow to have a light snack then NPO w exception of clear liquids.    Likely IOL at 34 weeks should patient continue to be stable - expedite delivery for maternal or fetal indications        33 weeks gestation of pregnancy    33w5d     B-positive  Rubella immune  GBS unknown - will obtain GBS intrapartum swab    Cervix /-2 on admission - /-2 this am (checked due to patient complaints of pain  - likely was bladder spasm with gonzalez)            Chava Conley MD  Obstetrics  [facility] -  Labor and Delivery

## 2024-09-21 NOTE — HOSPITAL COURSE
HD#2    at 33w5d admitted  with Preeclampsia with severe features.    Patient is s/p BMZ#1 at 1800hrs  (dose #2 due  evening at 1800)  On Magnesium sulfate IV for seizure prophylaxis    Reports some heartburn - Pepcid IV 20mg BID ordered    Denies VB, LOF or rhythmic Ctx  Reports fetal movement    Does report some intermittent headache - none at time of evaluation  Denies blurry vision or RUQ/epigastric pain     She indicates she has had some trouble breathing but feels better today. She indicates it feels as if the baby is pushing upward. She denies any H/A, blurred vision or dizziness today. 12 pound weight gain noted since yesterday. Pt was induced with pitocin to deliver vaginally a female  with Apgars 8 and 9 weighting 4 pounds 7.6 ounces. She desires to breast pump. Baby is in NICU.    2024 Blood pressures were labile and OB hospitalist was consulted. She is currently on Labetalol 300 mg po BID. She continues to breastfeed. She did receive one unit of PRBC and had a reactions- second unit of blood was not given.

## 2024-09-21 NOTE — SUBJECTIVE & OBJECTIVE
Obstetric HPI:  Patient reports irregular, most not palpated contractions, active fetal movement, absent vaginal bleeding , absent loss of fluid      Objective:     Vital Signs (Most Recent):  Temp: 98.2 °F (36.8 °C) (09/21/24 0005)  Pulse: 93 (09/21/24 0945)  Resp: 18 (09/20/24 2118)  BP: 119/85 (09/21/24 0933)  SpO2: 100 % (09/21/24 0945) Vital Signs (24h Range):  Temp:  [98 °F (36.7 °C)-98.2 °F (36.8 °C)] 98.2 °F (36.8 °C)  Pulse:  [] 93  Resp:  [17-19] 18  SpO2:  [90 %-100 %] 100 %  BP: (113-193)/() 119/85     Vitals:    09/21/24 0548 09/21/24 0603 09/21/24 0618 09/21/24 0633   BP: 129/86 131/87 139/89 (!) 135/90    09/21/24 0649 09/21/24 0704 09/21/24 0718 09/21/24 0733   BP: 117/77 118/67 121/70 117/70    09/21/24 0833 09/21/24 0933   BP: 113/75 119/85           Weight: 73.5 kg (162 lb)  Body mass index is 26.96 kg/m².    FHT: 120 Cat 1 (reassuring)  TOCO:  Q irregular minutes      Intake/Output Summary (Last 24 hours) at 9/21/2024 1123  Last data filed at 9/21/2024 1055  Gross per 24 hour   Intake --   Output 1150 ml   Net -1150 ml       Cervical Exam:  Dilation:  1  Effacement:  80  Station: -2  Presentation: Vertex     Significant Labs:  Recent Lab Results         09/21/24  0439   09/20/24  1631   09/20/24  1623        Albumin/Globulin Ratio   0.7         Albumin   2.8         ALP   134         ALT   15         Anion Gap   11         Appearance, UA     Turbid       AST   17         Bacteria, UA     Many       Baso #   0.05         Basophil %   0.7         Bilirubin (UA)     Negative       BILIRUBIN TOTAL   0.9         BUN   8         BUN/CREAT RATIO   10         Calcium   8.7         Chloride   107         CO2   23         Color, UA     Yellow       Creatinine   0.84         Urine Creatinine     120       Differential Method   Auto         eGFR   98         Eos #   0.06         Eos %   0.8         Globulin, Total   4.0         Glucose   63         Glucose, UA     Normal       Hematocrit   35.6          Hemoglobin   11.2         Hypo   Few         Immature Grans (Abs)   0.05         Immature Granulocytes   0.7         Ketones, UA     Negative       Leukocyte Esterase, UA     Moderate       Lymph #   1.89         Lymph %   24.6         Magnesium  6.6           MCH   25.1         MCHC   31.5         MCV   79.6         Microcytosis   Few         Mono #   0.68         Mono %   8.9         MPV   13.7         Mucous     Occasional       Neutrophils, Abs   4.94         Neutrophils Relative   64.3         NITRITE UA     Negative       nRBC   0.0         NUCLEATED RBC ABSOLUTE   0.00         Blood, UA     Negative       Ovalocytes   Few         pH, UA     7.5       PLATELET MORPHOLOGY   Few Large Platelets         Platelet Count   170         Poly   Few         Potassium   3.7         PROTEIN TOTAL   6.8         Protein, UA     200       Urine Protein     250.9       Protein/Creat Ratio     2.091       RBC   4.47         RBC, UA     3       RDW   13.6         Sodium   137         Spec Grav UA     1.016       Squamous Epithelial Cells, UA     Many       Uric Acid   5.8         Urine Culture     No Growth To Date  [P]       UROBILINOGEN UA     4       WBC, UA     14       WBC   7.67                  [P] - Preliminary Result               Physical Exam:   Constitutional: She is oriented to person, place, and time. She appears well-developed and well-nourished.    HENT:   Head: Normocephalic and atraumatic.    Eyes: Pupils are equal, round, and reactive to light.     Cardiovascular:  Normal rate and regular rhythm.      Exam reveals no edema.        Pulmonary/Chest: Effort normal and breath sounds normal. No respiratory distress.        Abdominal: Soft. Bowel sounds are normal. She exhibits no distension and no mass. There is no abdominal tenderness. There is no rebound and no guarding.   Gravid, soft - uterus nontender     Genitourinary:    Pelvic exam was performed with patient supine.   The external female genitalia  was normal.   No external genitalia lesions identified,Genitalia hair distrobution normal .             Musculoskeletal: Normal range of motion.       Neurological: She is alert and oriented to person, place, and time. She has normal reflexes. She displays normal reflexes. She exhibits normal muscle tone.    Skin: Skin is warm and dry. No rash noted. No erythema.    Psychiatric: She has a normal mood and affect. Her behavior is normal.       Review of Systems   Constitutional:  Negative for chills and fever.   Eyes:  Negative for visual disturbance.   Respiratory:  Negative for cough and shortness of breath.    Cardiovascular:  Negative for chest pain, palpitations and leg swelling.   Gastrointestinal:  Positive for reflux (added pepcid). Negative for abdominal pain, blood in stool, constipation, diarrhea, nausea and vomiting.   Genitourinary:  Negative for dysmenorrhea, dyspareunia, dysuria, flank pain, frequency, genital sores, hematuria, pelvic pain, urgency, vaginal bleeding, vaginal discharge, vaginal pain and vaginal odor.   Musculoskeletal:  Negative for back pain.   Neurological:  Positive for headaches (intermittent). Negative for vertigo, seizures, syncope and numbness.   Psychiatric/Behavioral:  Negative for depression. The patient is not nervous/anxious.

## 2024-09-21 NOTE — ASSESSMENT & PLAN NOTE
Continue Magnesium Sulfate for seizure prophylaxis  2nd dose Betamethasone at 1800 hrs    Not on antihypertensives at this time  Monitor BP closely    Patient with multiple complaints about her gonzalez - will trial removal and use bed pan. Should pressures worsen or patient be unable to use bed pan / or we cannot obtain accurate output then gonzalez will be replaced.    As BP is stable at this time and patient is stable will allow to have a light snack then NPO w exception of clear liquids.    Likely IOL at 34 weeks should patient continue to be stable - expedite delivery for maternal or fetal indications

## 2024-09-22 LAB
ALBUMIN SERPL BCP-MCNC: 2.7 G/DL (ref 3.5–5)
ALBUMIN/GLOB SERPL: 0.8 {RATIO}
ALP SERPL-CCNC: 113 U/L (ref 37–98)
ALT SERPL W P-5'-P-CCNC: 13 U/L (ref 13–56)
ANION GAP SERPL CALCULATED.3IONS-SCNC: 12 MMOL/L (ref 7–16)
AST SERPL W P-5'-P-CCNC: 16 U/L (ref 15–37)
BASOPHILS # BLD AUTO: 0.01 K/UL (ref 0–0.2)
BASOPHILS NFR BLD AUTO: 0.1 % (ref 0–1)
BILIRUB SERPL-MCNC: 0.6 MG/DL (ref ?–1.2)
BUN SERPL-MCNC: 11 MG/DL (ref 7–18)
BUN/CREAT SERPL: 12 (ref 6–20)
CALCIUM SERPL-MCNC: 7.8 MG/DL (ref 8.5–10.1)
CHLORIDE SERPL-SCNC: 104 MMOL/L (ref 98–107)
CO2 SERPL-SCNC: 24 MMOL/L (ref 21–32)
CREAT SERPL-MCNC: 0.92 MG/DL (ref 0.55–1.02)
DIFFERENTIAL METHOD BLD: ABNORMAL
EGFR (NO RACE VARIABLE) (RUSH/TITUS): 88 ML/MIN/1.73M2
EOSINOPHIL # BLD AUTO: 0 K/UL (ref 0–0.5)
EOSINOPHIL NFR BLD AUTO: 0 % (ref 1–4)
ERYTHROCYTE [DISTWIDTH] IN BLOOD BY AUTOMATED COUNT: 13.9 % (ref 11.5–14.5)
GLOBULIN SER-MCNC: 3.2 G/DL (ref 2–4)
GLUCOSE SERPL-MCNC: 112 MG/DL (ref 74–106)
HCT VFR BLD AUTO: 32.3 % (ref 38–47)
HGB BLD-MCNC: 10.2 G/DL (ref 12–16)
IMM GRANULOCYTES # BLD AUTO: 0.12 K/UL (ref 0–0.04)
IMM GRANULOCYTES NFR BLD: 0.9 % (ref 0–0.4)
LYMPHOCYTES # BLD AUTO: 0.98 K/UL (ref 1–4.8)
LYMPHOCYTES NFR BLD AUTO: 7.3 % (ref 27–41)
MAGNESIUM SERPL-MCNC: 6.3 MG/DL (ref 1.7–2.3)
MCH RBC QN AUTO: 25.3 PG (ref 27–31)
MCHC RBC AUTO-ENTMCNC: 31.6 G/DL (ref 32–36)
MCV RBC AUTO: 80.1 FL (ref 80–96)
MONOCYTES # BLD AUTO: 0.4 K/UL (ref 0–0.8)
MONOCYTES NFR BLD AUTO: 3 % (ref 2–6)
MPC BLD CALC-MCNC: 13.6 FL (ref 9.4–12.4)
NEUTROPHILS # BLD AUTO: 11.92 K/UL (ref 1.8–7.7)
NEUTROPHILS NFR BLD AUTO: 88.7 % (ref 53–65)
NRBC # BLD AUTO: 0 X10E3/UL
NRBC, AUTO (.00): 0 %
PLATELET # BLD AUTO: 178 K/UL (ref 150–400)
POTASSIUM SERPL-SCNC: 4.8 MMOL/L (ref 3.5–5.1)
PROT SERPL-MCNC: 5.9 G/DL (ref 6.4–8.2)
RBC # BLD AUTO: 4.03 M/UL (ref 4.2–5.4)
SODIUM SERPL-SCNC: 135 MMOL/L (ref 136–145)
UA COMPLETE W REFLEX CULTURE PNL UR: NORMAL
WBC # BLD AUTO: 13.43 K/UL (ref 4.5–11)

## 2024-09-22 PROCEDURE — 85025 COMPLETE CBC W/AUTO DIFF WBC: CPT | Performed by: OBSTETRICS & GYNECOLOGY

## 2024-09-22 PROCEDURE — 80053 COMPREHEN METABOLIC PANEL: CPT | Performed by: OBSTETRICS & GYNECOLOGY

## 2024-09-22 PROCEDURE — 36415 COLL VENOUS BLD VENIPUNCTURE: CPT | Performed by: OBSTETRICS & GYNECOLOGY

## 2024-09-22 PROCEDURE — 83735 ASSAY OF MAGNESIUM: CPT | Performed by: OBSTETRICS & GYNECOLOGY

## 2024-09-22 PROCEDURE — 25000003 PHARM REV CODE 250: Performed by: OBSTETRICS & GYNECOLOGY

## 2024-09-22 PROCEDURE — 11000001 HC ACUTE MED/SURG PRIVATE ROOM

## 2024-09-22 PROCEDURE — 63600175 PHARM REV CODE 636 W HCPCS: Performed by: OBSTETRICS & GYNECOLOGY

## 2024-09-22 RX ADMIN — SODIUM CHLORIDE, POTASSIUM CHLORIDE, SODIUM LACTATE AND CALCIUM CHLORIDE 75 ML/HR: 600; 310; 30; 20 INJECTION, SOLUTION INTRAVENOUS at 12:09

## 2024-09-22 RX ADMIN — FAMOTIDINE 20 MG: 10 INJECTION, SOLUTION INTRAVENOUS at 12:09

## 2024-09-22 NOTE — PLAN OF CARE
Problem:  Fall Injury Risk  Goal: Absence of Fall, Infant Drop and Related Injury  2024 1041 by Carri Campuzano RN  Outcome: Progressing  2024 1039 by Carri Campuzano RN  Outcome: Progressing     Problem: Adult Inpatient Plan of Care  Goal: Plan of Care Review  2024 1041 by Carri Campuzano RN  Outcome: Progressing  2024 1039 by Carri Campuzano RN  Outcome: Progressing  Goal: Patient-Specific Goal (Individualized)  2024 1041 by Carri Campuzano RN  Outcome: Progressing  2024 1039 by Carri Campuzano RN  Outcome: Progressing  Goal: Absence of Hospital-Acquired Illness or Injury  2024 1041 by Carri Campuzano RN  Outcome: Progressing  2024 1039 by Carri Campuzano RN  Outcome: Progressing  Goal: Optimal Comfort and Wellbeing  2024 1041 by Carri Campuzano RN  Outcome: Progressing  2024 1039 by Carri Campuzano RN  Outcome: Progressing  Goal: Readiness for Transition of Care  2024 1041 by Carri Campuzano RN  Outcome: Progressing  2024 1039 by Carri Campuzano RN  Outcome: Progressing     Problem: Hypertensive Disorders in Pregnancy  Goal: Patient-Fetal Stabilization  Outcome: Progressing

## 2024-09-22 NOTE — PROGRESS NOTES
[facility] -  Labor and Delivery  Obstetrics  Antepartum Progress Note    Patient Name: Alana Hdez  MRN: [id number]  Admission Date: 9/20/2024  Hospital Length of Stay: 2 days  Attending Physician: Fani Mitchell MD  Primary Care Provider: Arcelia, Primary Doctor    Subjective:     Principal Problem:Preeclampsia, severe, third trimester    HPI: Patient admitted on 09/20 for pre-ecclampsia with severe features. MagSO4 now 1 gm/hr after level of 8.1 yesterday. She denies HA, RUQ pain and contractions have decreased too. NICU talked to patient regarding delivery at 34 weeks. BP's controlled with Magnesium Sulfate    Obstetric HPI:  Patient reports None contractions, active fetal movement, absent vaginal bleeding , absent loss of fluid      Objective:     Vital Signs (Most Recent):  Temp: 97.8 °F (36.6 °C) (09/22/24 0724)  Pulse: 98 (09/22/24 1057)  Resp: 17 (09/22/24 0724)  BP: 129/83 (09/22/24 1054)  SpO2: 100 % (09/22/24 1056) Vital Signs (24h Range):  Temp:  [97.5 °F (36.4 °C)-97.8 °F (36.6 °C)] 97.8 °F (36.6 °C)  Pulse:  [] 98  Resp:  [17-19] 17  SpO2:  [89 %-100 %] 100 %  BP: (114-137)/(59-94) 129/83     Weight: 77.2 kg (170 lb 1.6 oz)  Body mass index is 28.31 kg/m².    FHT: 135 BPMCat 1 (reassuring)  TOCO:  Irregular contractions      Intake/Output Summary (Last 24 hours) at 9/22/2024 1115  Last data filed at 9/22/2024 0920  Gross per 24 hour   Intake --   Output 1000 ml   Net -1000 ml       Physical Exam    Cervical Exam:  Dilation:  1  Effacement:  80  Station: -1  Presentation: Vertex     Significant Labs:  Recent Lab Results         09/22/24  0432   09/21/24  1609        Albumin/Globulin Ratio 0.8   0.7       Albumin 2.7   2.7          123       ALT 13   16       Anion Gap 12   18       AST 16   20       Baso # 0.01   0.01       Basophil % 0.1   0.1       BILIRUBIN TOTAL 0.6   0.9       BUN 11   10       BUN/CREAT RATIO 12   12       Calcium 7.8   7.9       Chloride 104   102        CO2 24   18       Creatinine 0.92   0.85       Crenated RBC's   2+       Differential Method Auto   Auto       eGFR 88   96       Eos # 0.00   0.00       Eos % 0.0   0.0       Globulin, Total 3.2   4.1       Glucose 112   78       Hematocrit 32.3   36.8       Hemoglobin 10.2   11.3       Immature Grans (Abs) 0.12   0.09       Immature Granulocytes 0.9   0.7       Lymph # 0.98   1.19       Lymph % 7.3   9.5       Magnesium  6.3   8.1       MCH 25.3   25.1       MCHC 31.6   30.7       MCV 80.1   81.6       Microcytosis   1+       Mono # 0.40   0.50       Mono % 3.0   4.0       MPV 13.6   13.7       Neutrophils, Abs 11.92   10.73       Neutrophils Relative 88.7   85.7       nRBC 0.0   0.0       NUCLEATED RBC ABSOLUTE 0.00   0.00       Ovalocytes   Few       PLATELET MORPHOLOGY   Large & Giant Platelets       Platelet Count 178   181       Poly   Few       Potassium 4.8   4.3       PROTEIN TOTAL 5.9   6.8       RBC 4.03   4.51       RDW 13.9   14.0       Sodium 135   134       WBC 13.43   12.52               Assessment/Plan:     29 y.o. female  at 33w6d for:    Active Diagnoses:    Diagnosis Date Noted POA    PRINCIPAL PROBLEM:  Preeclampsia, severe, third trimester [O14.13] 2024 Yes    33 weeks gestation of pregnancy [Z3A.33] 2024 Not Applicable      Problems Resolved During this Admission:     Continue current care. Patient will be managed tomorrow by her own team and most likely will be induced at 34 weeks.      Fani Mitchell MD  Obstetrics  [facility] -  Labor and Delivery

## 2024-09-23 LAB — MAGNESIUM SERPL-MCNC: 4.5 MG/DL (ref 1.7–2.3)

## 2024-09-23 PROCEDURE — 36415 COLL VENOUS BLD VENIPUNCTURE: CPT | Performed by: ADVANCED PRACTICE MIDWIFE

## 2024-09-23 PROCEDURE — 63600175 PHARM REV CODE 636 W HCPCS: Performed by: OBSTETRICS & GYNECOLOGY

## 2024-09-23 PROCEDURE — 11000001 HC ACUTE MED/SURG PRIVATE ROOM

## 2024-09-23 PROCEDURE — 25000003 PHARM REV CODE 250: Performed by: ADVANCED PRACTICE MIDWIFE

## 2024-09-23 PROCEDURE — 25000003 PHARM REV CODE 250: Performed by: OBSTETRICS & GYNECOLOGY

## 2024-09-23 PROCEDURE — 83735 ASSAY OF MAGNESIUM: CPT | Performed by: ADVANCED PRACTICE MIDWIFE

## 2024-09-23 RX ORDER — NIFEDIPINE 10 MG/1
10 CAPSULE ORAL EVERY 4 HOURS
Status: DISCONTINUED | OUTPATIENT
Start: 2024-09-23 | End: 2024-09-24

## 2024-09-23 RX ORDER — LABETALOL 100 MG/1
100 TABLET, FILM COATED ORAL EVERY 12 HOURS
Status: DISCONTINUED | OUTPATIENT
Start: 2024-09-23 | End: 2024-09-26

## 2024-09-23 RX ADMIN — ONDANSETRON 4 MG: 2 INJECTION INTRAMUSCULAR; INTRAVENOUS at 09:09

## 2024-09-23 RX ADMIN — NIFEDIPINE 10 MG: 10 CAPSULE ORAL at 08:09

## 2024-09-23 RX ADMIN — SENNOSIDES AND DOCUSATE SODIUM 1 TABLET: 50; 8.6 TABLET ORAL at 12:09

## 2024-09-23 RX ADMIN — ACETAMINOPHEN 650 MG: 325 TABLET ORAL at 09:09

## 2024-09-23 RX ADMIN — FAMOTIDINE 20 MG: 10 INJECTION, SOLUTION INTRAVENOUS at 09:09

## 2024-09-23 RX ADMIN — MAGNESIUM SULFATE HEPTAHYDRATE 1 G/HR: 40 INJECTION, SOLUTION INTRAVENOUS at 06:09

## 2024-09-23 RX ADMIN — SODIUM CHLORIDE, POTASSIUM CHLORIDE, SODIUM LACTATE AND CALCIUM CHLORIDE: 600; 310; 30; 20 INJECTION, SOLUTION INTRAVENOUS at 12:09

## 2024-09-23 RX ADMIN — Medication 1 TABLET: at 08:09

## 2024-09-23 RX ADMIN — LABETALOL HYDROCHLORIDE 100 MG: 100 TABLET, FILM COATED ORAL at 09:09

## 2024-09-23 RX ADMIN — LABETALOL HYDROCHLORIDE 100 MG: 100 TABLET, FILM COATED ORAL at 08:09

## 2024-09-23 RX ADMIN — SODIUM CHLORIDE, POTASSIUM CHLORIDE, SODIUM LACTATE AND CALCIUM CHLORIDE: 600; 310; 30; 20 INJECTION, SOLUTION INTRAVENOUS at 01:09

## 2024-09-23 RX ADMIN — FAMOTIDINE 20 MG: 10 INJECTION, SOLUTION INTRAVENOUS at 08:09

## 2024-09-23 NOTE — PROVIDER PROGRESS NOTES - EMERGENCY DEPT.
[facility] -  Labor and Delivery  Obstetrics  Antepartum Progress Note    Patient Name: Alana Hdez  MRN: [id number]  Admission Date: 2024  Hospital Length of Stay: 3 days  Attending Physician: Fani Mitchell MD  Primary Care Provider: Arcelia, Primary Doctor    Subjective:     Principal Problem:Preeclampsia, severe, third trimester    HPI: Pt is a  that has been on magnesium infusing at 1 gram per hour. She is stable and is doing well.     Obstetric HPI:  Patient reports None contractions, active fetal movement, absent vaginal bleeding , absent loss of fluid      Objective:     Vital Signs (Most Recent):  Temp: 97.2 °F (36.2 °C) (24)  Pulse: 78 (246)  Resp: 17 (24)  BP: (!) 148/98 (2436)  SpO2: 100 % (24) Vital Signs (24h Range):  Temp:  [97.2 °F (36.2 °C)-98.2 °F (36.8 °C)] 97.2 °F (36.2 °C)  Pulse:  [] 78  Resp:  [17-19] 17  SpO2:  [90 %-100 %] 100 %  BP: (121-159)/() 148/98     Weight: 77.2 kg (170 lb 1.6 oz)  Body mass index is 28.31 kg/m².    FHT: 120s Cat 1 (reassuring)  TOCO:  none      Intake/Output Summary (Last 24 hours) at 2024 0832  Last data filed at 2024 0740  Gross per 24 hour   Intake 1186.67 ml   Output 2600 ml   Net -1413.33 ml       Physical Exam    Cervical Exam:  Dilation:  1  Effacement:  50%  Station: -3  Presentation: Vertex     Significant Labs:  Recent Lab Results       None            Assessment/Plan:     29 y.o. female  at 34w0d for:    Active Diagnoses:    Diagnosis Date Noted POA    PRINCIPAL PROBLEM:  Preeclampsia, severe, third trimester [O14.13] 2024 Yes    33 weeks gestation of pregnancy [Z3A.33] 2024 Not Applicable      Problems Resolved During this Admission:       Stop magnesium sulfate today  Start procardia 10 mg po every 4 hours  Discussed possible DC home with pre eclamptic precautions  Questions answered to desired level of satisfaction  Verbalized  understanding to all instructions and information        Marissa Hutchins CNM  Obstetrics  [facility] -  Labor and Delivery

## 2024-09-23 NOTE — PROCEDURES
OB ultrasound Note:    BPD- 35 weeks 0 day  HC-35 weeks 0 day  AC- 32 weeks 4 day  FL- 34 weeks 5 day    TERRY- 9.35 cm    Fetal heart rate:  145 bpm    Fetal position- vertex  Placental location- anterior    Impression-    ANABELLA April 26, 2024  Estimated gestational age 34 week 2 day  EFW 2250 g (4 lb 15 oz)  Pctl ( EFW) 34 th percentile      Biophysical Profile:    Fetal Movements- 2  Fetal breathing- 2  Fetal Tone- 2  Amniotic Fluid Volume- 2    Total - 8    Cervical length 3.3 cm

## 2024-09-23 NOTE — PROGRESS NOTES
"Called to room to discuss plan of care with patient. I was informed earlier that patient had experienced a drop in blood pressures after taking Procardia 10 mg po and labetalol 100 mg po as well as patient refusing a CT scan for an altered level of consciousness. I asked patient what were her concerns. Pt indicated she is "taking too much medications and is feeling drugged up". I asked patient what other medications has she taken since being given the procardia and labetalol after stopping the magnesium sulfate. She indicated she has not taken any other medications. I asked the patient why did she refuse the CT scan, she stated "ain't nothing wrong with my head. They didn't put anything on my breast to keep me from getting the radiation". I explained her abdomen was shielded to protect her baby. She stated "I don't need that" (referring to the CT scan). I explained to the patient based on the information about her behavior, headache, and level of conscious changes- I consulted with Dr. Monroy and a CT was recommended- pt refused. Patient's mother indicated she was concerned because her daughter was given too much medication. I ask the female significant other who identified herself as the patient's mother to explain her concerns for patient receiving "too much medicine"- she states patient received demerol on Friday and was offered zofran for n/v. Pt refused the zofran because her head was hurting and she (the mother) couldn't take demerol somewhere in her past as an option for medication and she thinks the patient is receiving too much medication. I explained to the patient's mother s/s of preeclampsia includes headache, n/v, right upper quadrant pain, increased swelling to hands/feet/face, and visual disturbances. I indicated that the patient was also having contractions and that the medications were given on Friday. As of today, she has not been given any demerol with nothing other than Procardia and labetalol. " "The patient's mother stated "that's too much medicine". I discussed the risks and benefits of not taking medication to control blood pressure increased blood pressure, abruption, fetal demise, maternal and/or fetal/ death, or eclampsia with possibility of taking blood pressure meds post delivery. I discussed risk of  delivery if not taking medications to prevent  delivery to include fetal lengthy hospital stay, IV fluid therapy, mechanical ventilation, fetus being transferred to higher level of care at another facility. The patient then ask, "what if I want the baby out". I discussed with patient, male significant other, and pt's mother that at this point her blood pressures are stable and baby is stable. After consultation with Dr. Monroy, it is not indicated at this time to proceed with an induction. Pt's mother indicated that someone over the weekend told her (the patient) she would be induced today and they said because it was "severe she would have her baby today". I indicated at this point delivery isn't warranted with BP, pt, and baby being stable. Pt's mother indicates the patient has "a lot of swelling in her legs". SCDs were removed and legs evaluated- trace of edema noted to both lower extremities, moves all extremities well, no increased warmth noted to back of legs.    Pt was concerned that her other pregnancy was "nothing like this". It was noted that this pregnancy is with a different father than the previous pregnancy. I did discuss this was a risk factor for pre eclampsia. Pt verbalized understanding.    After lengthy discussion, pt refused CT scan, agreed to monitor blood pressures with po antihypertensive medications and agreed with plan to prevent  labor. I did discuss with all present parties importance of monitoring pt's response and tolerance to medications prior to being discharged home. I discussed s/s of pre eclampsia and eclampsia as well as risks and benefits " of taking medications vs. Not taking medications to stabilize blood pressures and prevent  delivery. All parties verbalized understanding with numerous questions asked, I answered each question to their desired level of satisfaction and patient agreed with plan to take medications and monitor status.

## 2024-09-23 NOTE — PLAN OF CARE
Problem:  Fall Injury Risk  Goal: Absence of Fall, Infant Drop and Related Injury  Outcome: Progressing     Problem: Adult Inpatient Plan of Care  Goal: Plan of Care Review  Outcome: Progressing  Goal: Patient-Specific Goal (Individualized)  Outcome: Progressing  Goal: Absence of Hospital-Acquired Illness or Injury  Outcome: Progressing  Goal: Optimal Comfort and Wellbeing  Outcome: Progressing  Goal: Readiness for Transition of Care  Outcome: Progressing     Problem: Hypertensive Disorders in Pregnancy  Goal: Patient-Fetal Stabilization  Outcome: Progressing

## 2024-09-24 ENCOUNTER — ANESTHESIA (OUTPATIENT)
Dept: OBSTETRICS AND GYNECOLOGY | Facility: HOSPITAL | Age: 29
End: 2024-09-24
Payer: MEDICAID

## 2024-09-24 ENCOUNTER — ANESTHESIA EVENT (OUTPATIENT)
Dept: OBSTETRICS AND GYNECOLOGY | Facility: HOSPITAL | Age: 29
End: 2024-09-24
Payer: MEDICAID

## 2024-09-24 PROBLEM — Z3A.34 34 WEEKS GESTATION OF PREGNANCY: Status: ACTIVE | Noted: 2024-09-24

## 2024-09-24 LAB
ALBUMIN SERPL BCP-MCNC: 2.4 G/DL (ref 3.5–5)
ALBUMIN/GLOB SERPL: 0.8 {RATIO}
ALP SERPL-CCNC: 110 U/L (ref 37–98)
ALT SERPL W P-5'-P-CCNC: 19 U/L (ref 13–56)
ANION GAP SERPL CALCULATED.3IONS-SCNC: 11 MMOL/L (ref 7–16)
ANISOCYTOSIS BLD QL SMEAR: ABNORMAL
AST SERPL W P-5'-P-CCNC: 24 U/L (ref 15–37)
BASOPHILS # BLD AUTO: 0.03 K/UL (ref 0–0.2)
BASOPHILS NFR BLD AUTO: 0.3 % (ref 0–1)
BILIRUB SERPL-MCNC: 0.5 MG/DL (ref ?–1.2)
BUN SERPL-MCNC: 18 MG/DL (ref 7–18)
BUN/CREAT SERPL: 21 (ref 6–20)
CALCIUM SERPL-MCNC: 8.3 MG/DL (ref 8.5–10.1)
CHLORIDE SERPL-SCNC: 109 MMOL/L (ref 98–107)
CO2 SERPL-SCNC: 24 MMOL/L (ref 21–32)
CREAT SERPL-MCNC: 0.87 MG/DL (ref 0.55–1.02)
DIFFERENTIAL METHOD BLD: ABNORMAL
EGFR (NO RACE VARIABLE) (RUSH/TITUS): 94 ML/MIN/1.73M2
EOSINOPHIL # BLD AUTO: 0.02 K/UL (ref 0–0.5)
EOSINOPHIL NFR BLD AUTO: 0.2 % (ref 1–4)
ERYTHROCYTE [DISTWIDTH] IN BLOOD BY AUTOMATED COUNT: 13.9 % (ref 11.5–14.5)
GLOBULIN SER-MCNC: 2.9 G/DL (ref 2–4)
GLUCOSE SERPL-MCNC: 108 MG/DL (ref 74–106)
HCT VFR BLD AUTO: 32.1 % (ref 38–47)
HGB BLD-MCNC: 10 G/DL (ref 12–16)
IMM GRANULOCYTES # BLD AUTO: 0.23 K/UL (ref 0–0.04)
IMM GRANULOCYTES NFR BLD: 2.1 % (ref 0–0.4)
INDIRECT COOMBS: NORMAL
LYMPHOCYTES # BLD AUTO: 1.98 K/UL (ref 1–4.8)
LYMPHOCYTES NFR BLD AUTO: 18.1 % (ref 27–41)
MCH RBC QN AUTO: 25.3 PG (ref 27–31)
MCHC RBC AUTO-ENTMCNC: 31.2 G/DL (ref 32–36)
MCV RBC AUTO: 81.3 FL (ref 80–96)
MONOCYTES # BLD AUTO: 1 K/UL (ref 0–0.8)
MONOCYTES NFR BLD AUTO: 9.1 % (ref 2–6)
MPC BLD CALC-MCNC: 13.1 FL (ref 9.4–12.4)
NEUTROPHILS # BLD AUTO: 7.69 K/UL (ref 1.8–7.7)
NEUTROPHILS NFR BLD AUTO: 70.2 % (ref 53–65)
NRBC # BLD AUTO: 0.02 X10E3/UL
NRBC, AUTO (.00): 0.2 %
PLATELET # BLD AUTO: 157 K/UL (ref 150–400)
PLATELET MORPHOLOGY: ABNORMAL
POTASSIUM SERPL-SCNC: 3.8 MMOL/L (ref 3.5–5.1)
PROT SERPL-MCNC: 5.3 G/DL (ref 6.4–8.2)
RBC # BLD AUTO: 3.95 M/UL (ref 4.2–5.4)
RH BLD: NORMAL
SODIUM SERPL-SCNC: 140 MMOL/L (ref 136–145)
SPECIMEN OUTDATE: NORMAL
SYPHILIS AB INTERPRETATION: NORMAL
URATE SERPL-MCNC: 6.6 MG/DL (ref 2.6–6)
WBC # BLD AUTO: 10.95 K/UL (ref 4.5–11)

## 2024-09-24 PROCEDURE — 25000003 PHARM REV CODE 250: Performed by: ANESTHESIOLOGY

## 2024-09-24 PROCEDURE — 51702 INSERT TEMP BLADDER CATH: CPT

## 2024-09-24 PROCEDURE — 84550 ASSAY OF BLOOD/URIC ACID: CPT | Performed by: OBSTETRICS & GYNECOLOGY

## 2024-09-24 PROCEDURE — 80053 COMPREHEN METABOLIC PANEL: CPT | Performed by: OBSTETRICS & GYNECOLOGY

## 2024-09-24 PROCEDURE — 86900 BLOOD TYPING SEROLOGIC ABO: CPT | Performed by: OBSTETRICS & GYNECOLOGY

## 2024-09-24 PROCEDURE — 10907ZC DRAINAGE OF AMNIOTIC FLUID, THERAPEUTIC FROM PRODUCTS OF CONCEPTION, VIA NATURAL OR ARTIFICIAL OPENING: ICD-10-PCS | Performed by: ADVANCED PRACTICE MIDWIFE

## 2024-09-24 PROCEDURE — 25000003 PHARM REV CODE 250: Performed by: ADVANCED PRACTICE MIDWIFE

## 2024-09-24 PROCEDURE — 11000001 HC ACUTE MED/SURG PRIVATE ROOM

## 2024-09-24 PROCEDURE — 72100002 HC LABOR CARE, 1ST 8 HOURS

## 2024-09-24 PROCEDURE — 86780 TREPONEMA PALLIDUM: CPT | Performed by: ADVANCED PRACTICE MIDWIFE

## 2024-09-24 PROCEDURE — 63600175 PHARM REV CODE 636 W HCPCS: Performed by: OBSTETRICS & GYNECOLOGY

## 2024-09-24 PROCEDURE — 36415 COLL VENOUS BLD VENIPUNCTURE: CPT | Performed by: ADVANCED PRACTICE MIDWIFE

## 2024-09-24 PROCEDURE — 85025 COMPLETE CBC W/AUTO DIFF WBC: CPT | Performed by: OBSTETRICS & GYNECOLOGY

## 2024-09-24 PROCEDURE — 63600175 PHARM REV CODE 636 W HCPCS: Performed by: ADVANCED PRACTICE MIDWIFE

## 2024-09-24 PROCEDURE — 88307 TISSUE EXAM BY PATHOLOGIST: CPT | Mod: TC,SUR | Performed by: OBSTETRICS & GYNECOLOGY

## 2024-09-24 PROCEDURE — 25000003 PHARM REV CODE 250: Performed by: OBSTETRICS & GYNECOLOGY

## 2024-09-24 PROCEDURE — 59410 OBSTETRICAL CARE: CPT | Mod: TH,,, | Performed by: ADVANCED PRACTICE MIDWIFE

## 2024-09-24 PROCEDURE — 63600175 PHARM REV CODE 636 W HCPCS: Performed by: ANESTHESIOLOGY

## 2024-09-24 PROCEDURE — 86901 BLOOD TYPING SEROLOGIC RH(D): CPT | Performed by: OBSTETRICS & GYNECOLOGY

## 2024-09-24 PROCEDURE — 3E033VJ INTRODUCTION OF OTHER HORMONE INTO PERIPHERAL VEIN, PERCUTANEOUS APPROACH: ICD-10-PCS | Performed by: ADVANCED PRACTICE MIDWIFE

## 2024-09-24 PROCEDURE — 62326 NJX INTERLAMINAR LMBR/SAC: CPT | Mod: AA | Performed by: ANESTHESIOLOGY

## 2024-09-24 PROCEDURE — C1751 CATH, INF, PER/CENT/MIDLINE: HCPCS | Performed by: ANESTHESIOLOGY

## 2024-09-24 PROCEDURE — 88307 TISSUE EXAM BY PATHOLOGIST: CPT | Mod: 26,,, | Performed by: PATHOLOGY

## 2024-09-24 PROCEDURE — 36415 COLL VENOUS BLD VENIPUNCTURE: CPT | Performed by: OBSTETRICS & GYNECOLOGY

## 2024-09-24 PROCEDURE — 72200006 HC VAGINAL DELIVERY LEVEL III

## 2024-09-24 RX ORDER — OXYTOCIN-SODIUM CHLORIDE 0.9% IV SOLN 30 UNIT/500ML 30-0.9/5 UT/ML-%
10 SOLUTION INTRAVENOUS ONCE AS NEEDED
Status: DISCONTINUED | OUTPATIENT
Start: 2024-09-24 | End: 2024-09-24

## 2024-09-24 RX ORDER — CALCIUM CARBONATE 200(500)MG
500 TABLET,CHEWABLE ORAL 3 TIMES DAILY PRN
Status: DISCONTINUED | OUTPATIENT
Start: 2024-09-24 | End: 2024-09-24

## 2024-09-24 RX ORDER — OXYCODONE HYDROCHLORIDE 5 MG/1
10 TABLET ORAL EVERY 4 HOURS PRN
Status: CANCELLED | OUTPATIENT
Start: 2024-09-24 | End: 2024-09-25

## 2024-09-24 RX ORDER — MISOPROSTOL 200 UG/1
800 TABLET ORAL ONCE AS NEEDED
Status: DISCONTINUED | OUTPATIENT
Start: 2024-09-24 | End: 2024-09-24

## 2024-09-24 RX ORDER — OXYTOCIN-SODIUM CHLORIDE 0.9% IV SOLN 30 UNIT/500ML 30-0.9/5 UT/ML-%
95 SOLUTION INTRAVENOUS ONCE AS NEEDED
Status: DISCONTINUED | OUTPATIENT
Start: 2024-09-24 | End: 2024-09-25

## 2024-09-24 RX ORDER — ACETAMINOPHEN AND CODEINE PHOSPHATE 300; 30 MG/1; MG/1
2 TABLET ORAL EVERY 4 HOURS PRN
Status: DISCONTINUED | OUTPATIENT
Start: 2024-09-24 | End: 2024-09-27 | Stop reason: HOSPADM

## 2024-09-24 RX ORDER — ACETAMINOPHEN 325 MG/1
650 TABLET ORAL EVERY 6 HOURS SCHEDULED
Status: DISCONTINUED | OUTPATIENT
Start: 2024-09-24 | End: 2024-09-24

## 2024-09-24 RX ORDER — SODIUM CHLORIDE 0.9 % (FLUSH) 0.9 %
3 SYRINGE (ML) INJECTION EVERY 8 HOURS PRN
Status: DISCONTINUED | OUTPATIENT
Start: 2024-09-24 | End: 2024-09-27 | Stop reason: HOSPADM

## 2024-09-24 RX ORDER — CARBOPROST TROMETHAMINE 250 UG/ML
250 INJECTION, SOLUTION INTRAMUSCULAR
Status: DISCONTINUED | OUTPATIENT
Start: 2024-09-24 | End: 2024-09-27 | Stop reason: HOSPADM

## 2024-09-24 RX ORDER — OXYTOCIN-SODIUM CHLORIDE 0.9% IV SOLN 30 UNIT/500ML 30-0.9/5 UT/ML-%
95 SOLUTION INTRAVENOUS CONTINUOUS PRN
Status: DISCONTINUED | OUTPATIENT
Start: 2024-09-24 | End: 2024-09-24

## 2024-09-24 RX ORDER — METHYLERGONOVINE MALEATE 0.2 MG/ML
200 INJECTION INTRAVENOUS ONCE AS NEEDED
Status: DISCONTINUED | OUTPATIENT
Start: 2024-09-24 | End: 2024-09-24

## 2024-09-24 RX ORDER — HYDROCORTISONE 25 MG/G
CREAM TOPICAL 3 TIMES DAILY PRN
Status: DISCONTINUED | OUTPATIENT
Start: 2024-09-24 | End: 2024-09-27 | Stop reason: HOSPADM

## 2024-09-24 RX ORDER — LANOLIN ALCOHOL/MO/W.PET/CERES
1 CREAM (GRAM) TOPICAL 2 TIMES DAILY
Status: DISCONTINUED | OUTPATIENT
Start: 2024-09-24 | End: 2024-09-27 | Stop reason: HOSPADM

## 2024-09-24 RX ORDER — TRANEXAMIC ACID 10 MG/ML
1000 INJECTION, SOLUTION INTRAVENOUS EVERY 30 MIN PRN
Status: DISCONTINUED | OUTPATIENT
Start: 2024-09-24 | End: 2024-09-24

## 2024-09-24 RX ORDER — ACETAMINOPHEN 325 MG/1
650 TABLET ORAL EVERY 6 HOURS
Status: CANCELLED | OUTPATIENT
Start: 2024-09-24 | End: 2024-09-25

## 2024-09-24 RX ORDER — OXYTOCIN 10 [USP'U]/ML
10 INJECTION, SOLUTION INTRAMUSCULAR; INTRAVENOUS ONCE AS NEEDED
Status: DISCONTINUED | OUTPATIENT
Start: 2024-09-24 | End: 2024-09-27 | Stop reason: HOSPADM

## 2024-09-24 RX ORDER — CARBOPROST TROMETHAMINE 250 UG/ML
250 INJECTION, SOLUTION INTRAMUSCULAR
Status: DISCONTINUED | OUTPATIENT
Start: 2024-09-24 | End: 2024-09-24

## 2024-09-24 RX ORDER — OXYTOCIN-SODIUM CHLORIDE 0.9% IV SOLN 30 UNIT/500ML 30-0.9/5 UT/ML-%
95 SOLUTION INTRAVENOUS CONTINUOUS PRN
Status: DISCONTINUED | OUTPATIENT
Start: 2024-09-24 | End: 2024-09-25

## 2024-09-24 RX ORDER — DIPHENOXYLATE HYDROCHLORIDE AND ATROPINE SULFATE 2.5; .025 MG/1; MG/1
2 TABLET ORAL EVERY 6 HOURS PRN
Status: DISCONTINUED | OUTPATIENT
Start: 2024-09-24 | End: 2024-09-24

## 2024-09-24 RX ORDER — DIPHENHYDRAMINE HCL 25 MG
25 CAPSULE ORAL EVERY 4 HOURS PRN
Status: DISCONTINUED | OUTPATIENT
Start: 2024-09-24 | End: 2024-09-27 | Stop reason: HOSPADM

## 2024-09-24 RX ORDER — PRENATAL WITH FERROUS FUM AND FOLIC ACID 3080; 920; 120; 400; 22; 1.84; 3; 20; 10; 1; 12; 200; 27; 25; 2 [IU]/1; [IU]/1; MG/1; [IU]/1; MG/1; MG/1; MG/1; MG/1; MG/1; MG/1; UG/1; MG/1; MG/1; MG/1; MG/1
1 TABLET ORAL DAILY
Status: DISCONTINUED | OUTPATIENT
Start: 2024-09-24 | End: 2024-09-27 | Stop reason: HOSPADM

## 2024-09-24 RX ORDER — SIMETHICONE 80 MG
1 TABLET,CHEWABLE ORAL 4 TIMES DAILY PRN
Status: DISCONTINUED | OUTPATIENT
Start: 2024-09-24 | End: 2024-09-24

## 2024-09-24 RX ORDER — MISOPROSTOL 200 UG/1
800 TABLET ORAL ONCE AS NEEDED
Status: DISCONTINUED | OUTPATIENT
Start: 2024-09-24 | End: 2024-09-27 | Stop reason: HOSPADM

## 2024-09-24 RX ORDER — ONDANSETRON 4 MG/1
8 TABLET, ORALLY DISINTEGRATING ORAL EVERY 8 HOURS PRN
Status: DISCONTINUED | OUTPATIENT
Start: 2024-09-24 | End: 2024-09-27 | Stop reason: HOSPADM

## 2024-09-24 RX ORDER — LIDOCAINE HYDROCHLORIDE 20 MG/ML
INJECTION, SOLUTION EPIDURAL; INFILTRATION; INTRACAUDAL; PERINEURAL
Status: COMPLETED | OUTPATIENT
Start: 2024-09-24 | End: 2024-09-24

## 2024-09-24 RX ORDER — FENTANYL/ROPIVACAINE/NS/PF 2MCG/ML-.2
PLASTIC BAG, INJECTION (ML) INJECTION CONTINUOUS
Status: DISCONTINUED | OUTPATIENT
Start: 2024-09-24 | End: 2024-09-24

## 2024-09-24 RX ORDER — IBUPROFEN 800 MG/1
800 TABLET ORAL EVERY 8 HOURS PRN
Status: DISCONTINUED | OUTPATIENT
Start: 2024-09-24 | End: 2024-09-27 | Stop reason: HOSPADM

## 2024-09-24 RX ORDER — SODIUM CHLORIDE, SODIUM LACTATE, POTASSIUM CHLORIDE, CALCIUM CHLORIDE 600; 310; 30; 20 MG/100ML; MG/100ML; MG/100ML; MG/100ML
INJECTION, SOLUTION INTRAVENOUS CONTINUOUS
Status: DISCONTINUED | OUTPATIENT
Start: 2024-09-24 | End: 2024-09-24

## 2024-09-24 RX ORDER — ONDANSETRON HYDROCHLORIDE 2 MG/ML
4 INJECTION, SOLUTION INTRAVENOUS EVERY 6 HOURS PRN
Status: CANCELLED | OUTPATIENT
Start: 2024-09-24 | End: 2024-09-25

## 2024-09-24 RX ORDER — DOCUSATE SODIUM 100 MG/1
200 CAPSULE, LIQUID FILLED ORAL 2 TIMES DAILY PRN
Status: DISCONTINUED | OUTPATIENT
Start: 2024-09-24 | End: 2024-09-27 | Stop reason: HOSPADM

## 2024-09-24 RX ORDER — ONDANSETRON 4 MG/1
8 TABLET, ORALLY DISINTEGRATING ORAL EVERY 8 HOURS PRN
Status: DISCONTINUED | OUTPATIENT
Start: 2024-09-24 | End: 2024-09-24

## 2024-09-24 RX ORDER — LIDOCAINE HYDROCHLORIDE 20 MG/ML
10 INJECTION, SOLUTION EPIDURAL; INFILTRATION; INTRACAUDAL; PERINEURAL EVERY 5 MIN PRN
Status: DISCONTINUED | OUTPATIENT
Start: 2024-09-24 | End: 2024-09-24

## 2024-09-24 RX ORDER — ONDANSETRON HYDROCHLORIDE 2 MG/ML
4 INJECTION, SOLUTION INTRAVENOUS EVERY 6 HOURS PRN
Status: DISCONTINUED | OUTPATIENT
Start: 2024-09-24 | End: 2024-09-24

## 2024-09-24 RX ORDER — EPHEDRINE SULFATE 50 MG/ML
10 INJECTION, SOLUTION INTRAVENOUS ONCE
Status: DISCONTINUED | OUTPATIENT
Start: 2024-09-24 | End: 2024-09-24

## 2024-09-24 RX ORDER — OXYCODONE HYDROCHLORIDE 5 MG/1
5 TABLET ORAL EVERY 4 HOURS PRN
Status: CANCELLED | OUTPATIENT
Start: 2024-09-24 | End: 2024-09-25

## 2024-09-24 RX ORDER — OXYTOCIN-SODIUM CHLORIDE 0.9% IV SOLN 30 UNIT/500ML 30-0.9/5 UT/ML-%
95 SOLUTION INTRAVENOUS ONCE AS NEEDED
Status: DISCONTINUED | OUTPATIENT
Start: 2024-09-24 | End: 2024-09-24

## 2024-09-24 RX ORDER — OXYTOCIN-SODIUM CHLORIDE 0.9% IV SOLN 30 UNIT/500ML 30-0.9/5 UT/ML-%
0-32 SOLUTION INTRAVENOUS CONTINUOUS
Status: DISCONTINUED | OUTPATIENT
Start: 2024-09-24 | End: 2024-09-24

## 2024-09-24 RX ORDER — OXYTOCIN-SODIUM CHLORIDE 0.9% IV SOLN 30 UNIT/500ML 30-0.9/5 UT/ML-%
20 SOLUTION INTRAVENOUS ONCE AS NEEDED
Status: DISCONTINUED | OUTPATIENT
Start: 2024-09-24 | End: 2024-09-25

## 2024-09-24 RX ORDER — TRANEXAMIC ACID 10 MG/ML
1000 INJECTION, SOLUTION INTRAVENOUS EVERY 30 MIN PRN
Status: DISCONTINUED | OUTPATIENT
Start: 2024-09-24 | End: 2024-09-27 | Stop reason: HOSPADM

## 2024-09-24 RX ORDER — SIMETHICONE 80 MG
1 TABLET,CHEWABLE ORAL EVERY 6 HOURS PRN
Status: DISCONTINUED | OUTPATIENT
Start: 2024-09-24 | End: 2024-09-27 | Stop reason: HOSPADM

## 2024-09-24 RX ORDER — OXYTOCIN 10 [USP'U]/ML
10 INJECTION, SOLUTION INTRAMUSCULAR; INTRAVENOUS ONCE AS NEEDED
Status: DISCONTINUED | OUTPATIENT
Start: 2024-09-24 | End: 2024-09-24

## 2024-09-24 RX ORDER — MISOPROSTOL 200 UG/1
800 TABLET ORAL ONCE AS NEEDED
Status: COMPLETED | OUTPATIENT
Start: 2024-09-24 | End: 2024-09-24

## 2024-09-24 RX ORDER — ACETAMINOPHEN AND CODEINE PHOSPHATE 300; 30 MG/1; MG/1
1 TABLET ORAL EVERY 4 HOURS PRN
Status: DISCONTINUED | OUTPATIENT
Start: 2024-09-24 | End: 2024-09-27 | Stop reason: HOSPADM

## 2024-09-24 RX ORDER — SODIUM CHLORIDE 9 MG/ML
INJECTION, SOLUTION INTRAVENOUS
Status: DISCONTINUED | OUTPATIENT
Start: 2024-09-24 | End: 2024-09-24

## 2024-09-24 RX ORDER — DIPHENOXYLATE HYDROCHLORIDE AND ATROPINE SULFATE 2.5; .025 MG/1; MG/1
2 TABLET ORAL EVERY 6 HOURS PRN
Status: DISCONTINUED | OUTPATIENT
Start: 2024-09-24 | End: 2024-09-27 | Stop reason: HOSPADM

## 2024-09-24 RX ORDER — METHYLERGONOVINE MALEATE 0.2 MG/ML
200 INJECTION INTRAVENOUS ONCE AS NEEDED
Status: DISCONTINUED | OUTPATIENT
Start: 2024-09-24 | End: 2024-09-27 | Stop reason: HOSPADM

## 2024-09-24 RX ORDER — MUPIROCIN 20 MG/G
OINTMENT TOPICAL
Status: DISCONTINUED | OUTPATIENT
Start: 2024-09-24 | End: 2024-09-24

## 2024-09-24 RX ORDER — KETOROLAC TROMETHAMINE 30 MG/ML
30 INJECTION, SOLUTION INTRAMUSCULAR; INTRAVENOUS EVERY 6 HOURS
Status: CANCELLED | OUTPATIENT
Start: 2024-09-24 | End: 2024-09-25

## 2024-09-24 RX ORDER — OXYTOCIN-SODIUM CHLORIDE 0.9% IV SOLN 30 UNIT/500ML 30-0.9/5 UT/ML-%
10 SOLUTION INTRAVENOUS ONCE AS NEEDED
Status: COMPLETED | OUTPATIENT
Start: 2024-09-24 | End: 2024-09-24

## 2024-09-24 RX ORDER — DIPHENHYDRAMINE HYDROCHLORIDE 50 MG/ML
25 INJECTION INTRAMUSCULAR; INTRAVENOUS EVERY 4 HOURS PRN
Status: DISCONTINUED | OUTPATIENT
Start: 2024-09-24 | End: 2024-09-27 | Stop reason: HOSPADM

## 2024-09-24 RX ORDER — LIDOCAINE HYDROCHLORIDE 10 MG/ML
10 INJECTION, SOLUTION INFILTRATION; PERINEURAL ONCE AS NEEDED
Status: DISCONTINUED | OUTPATIENT
Start: 2024-09-24 | End: 2024-09-24

## 2024-09-24 RX ADMIN — LABETALOL HYDROCHLORIDE 100 MG: 100 TABLET, FILM COATED ORAL at 08:09

## 2024-09-24 RX ADMIN — MISOPROSTOL 800 MCG: 200 TABLET ORAL at 01:09

## 2024-09-24 RX ADMIN — DOCUSATE SODIUM 200 MG: 100 CAPSULE, LIQUID FILLED ORAL at 09:09

## 2024-09-24 RX ADMIN — AMPICILLIN SODIUM 2 G: 2 INJECTION, POWDER, FOR SOLUTION INTRAMUSCULAR; INTRAVENOUS at 09:09

## 2024-09-24 RX ADMIN — FAMOTIDINE 20 MG: 10 INJECTION, SOLUTION INTRAVENOUS at 09:09

## 2024-09-24 RX ADMIN — LABETALOL HYDROCHLORIDE 100 MG: 100 TABLET, FILM COATED ORAL at 09:09

## 2024-09-24 RX ADMIN — OXYTOCIN 2 MILLI-UNITS/MIN: 10 INJECTION INTRAVENOUS at 09:09

## 2024-09-24 RX ADMIN — SODIUM CHLORIDE, POTASSIUM CHLORIDE, SODIUM LACTATE AND CALCIUM CHLORIDE: 600; 310; 30; 20 INJECTION, SOLUTION INTRAVENOUS at 11:09

## 2024-09-24 RX ADMIN — LABETALOL HYDROCHLORIDE 20 MG: 5 INJECTION, SOLUTION INTRAVENOUS at 02:09

## 2024-09-24 RX ADMIN — SODIUM CHLORIDE, POTASSIUM CHLORIDE, SODIUM LACTATE AND CALCIUM CHLORIDE: 600; 310; 30; 20 INJECTION, SOLUTION INTRAVENOUS at 02:09

## 2024-09-24 RX ADMIN — OXYTOCIN-SODIUM CHLORIDE 0.9% IV SOLN 30 UNIT/500ML 10 UNITS: 30-0.9/5 SOLUTION at 12:09

## 2024-09-24 RX ADMIN — NIFEDIPINE 10 MG: 10 CAPSULE ORAL at 06:09

## 2024-09-24 RX ADMIN — ROPIVACAINE HYDROCHLORIDE 500 MCG: 10 INJECTION, SOLUTION EPIDURAL at 12:09

## 2024-09-24 RX ADMIN — NIFEDIPINE 10 MG: 10 CAPSULE ORAL at 01:09

## 2024-09-24 RX ADMIN — LIDOCAINE HYDROCHLORIDE 10 ML: 20 INJECTION, SOLUTION INTRAVENOUS at 12:09

## 2024-09-24 RX ADMIN — FERROUS SULFATE TAB 325 MG (65 MG ELEMENTAL FE) 1 EACH: 325 (65 FE) TAB at 08:09

## 2024-09-24 NOTE — LACTATION NOTE
This note was copied from a baby's chart.  Breastfeeding rounds done, mom has personal pump for pumping, mom given wash basin, dish soap, and steam bag from cleaning, mom instructed on cleaning, mom given guide to pumping in NICU, mom denies questions or concerns, will call with any needs

## 2024-09-24 NOTE — ASSESSMENT & PLAN NOTE
Continue Magnesium Sulfate for seizure prophylaxis  2nd dose Betamethasone at 1800 hrs    Not on antihypertensives at this time  Monitor BP closely    Patient with multiple complaints about her gonzalez - will trial removal and use bed pan. Should pressures worsen or patient be unable to use bed pan / or we cannot obtain accurate output then gonzalez will be replaced.    As BP is stable at this time and patient is stable will allow to have a light snack then NPO w exception of clear liquids.    Likely IOL at 34 weeks should patient continue to be stable - expedite delivery for maternal or fetal indications    9/24/2024 Proceed with pitocin induction of labor . Risks and benefits discussed. Verbalized understanding

## 2024-09-24 NOTE — SUBJECTIVE & OBJECTIVE
Obstetric HPI:  Patient reports irregular contractions, active fetal movement, absent vaginal bleeding , absent loss of fluid      Objective:     Vital Signs (Most Recent):  Temp: 98.5 °F (36.9 °C) (09/24/24 0740)  Pulse: 96 (09/24/24 0740)  Resp: 16 (09/24/24 0329)  BP: 127/80 (09/24/24 0607)  SpO2: 98 % (09/24/24 0740) Vital Signs (24h Range):  Temp:  [97.8 °F (36.6 °C)-98.8 °F (37.1 °C)] 98.5 °F (36.9 °C)  Pulse:  [] 96  Resp:  [16-18] 16  SpO2:  [96 %-100 %] 98 %  BP: ()/() 127/80     Weight: 82.7 kg (182 lb 6.4 oz)  Body mass index is 30.35 kg/m².    FHT: 130s Cat 1 (reassuring)  TOCO:  Q 3-10 minutes      Intake/Output Summary (Last 24 hours) at 9/24/2024 0840  Last data filed at 9/24/2024 0727  Gross per 24 hour   Intake 3554.56 ml   Output 800 ml   Net 2754.56 ml       Cervical Exam:  Dilation:  1  Effacement:  50%  Station: -2  Presentation: Vertex     Significant Labs:  Recent Lab Results         09/24/24  0703   09/23/24  1248        Albumin/Globulin Ratio 0.8         Albumin 2.4                  ALT 19         Anion Gap 11         Aniso Few         AST 24         Baso # 0.03         Basophil % 0.3         BILIRUBIN TOTAL 0.5         BUN 18         BUN/CREAT RATIO 21         Calcium 8.3         Chloride 109         CO2 24         Creatinine 0.87         Differential Method Auto         eGFR 94         Eos # 0.02         Eos % 0.2         Globulin, Total 2.9         Glucose 108         Hematocrit 32.1         Hemoglobin 10.0         Immature Grans (Abs) 0.23         Immature Granulocytes 2.1         Lymph # 1.98         Lymph % 18.1         Magnesium    4.5       MCH 25.3         MCHC 31.2         MCV 81.3         Mono # 1.00         Mono % 9.1         MPV 13.1         Neutrophils, Abs 7.69         Neutrophils Relative 70.2         nRBC 0.2         NUCLEATED RBC ABSOLUTE 0.02         PLATELET MORPHOLOGY Few Large Platelets         Platelet Count 157         Potassium 3.8          PROTEIN TOTAL 5.3         RBC 3.95         RDW 13.9         Sodium 140         Uric Acid 6.6         WBC 10.95                 Physical Exam:   Constitutional: She is oriented to person, place, and time. She appears well-developed and well-nourished.        Pulmonary/Chest: Effort normal.        Abdominal: Soft.             Musculoskeletal: Moves all extremeties.       Neurological: She is alert and oriented to person, place, and time.   Reflexes 3+ bilaterally, 2+ edema noted to bilaterally, moves all extremities well    Skin: Skin is warm and dry.    Psychiatric: She has a normal mood and affect. Her behavior is normal. Judgment and thought content normal.       Review of Systems

## 2024-09-24 NOTE — ANESTHESIA PREPROCEDURE EVALUATION
2024  Alana Hdez is a 29 y.o., female.      Pre-op Assessment    I have reviewed the Patient Summary Reports.       I have reviewed the Medications.     Review of Systems         Anesthesia Plan  Type of Anesthesia, risks & benefits discussed:    Anesthesia Type: Epidural  Intra-op Monitoring Plan: Standard ASA Monitors  Informed Consent: Informed consent signed with the Patient and all parties understand the risks and agree with anesthesia plan.  All questions answered.   ASA Score: 3    Ready For Surgery From Anesthesia Perspective.     .  No known anesthetic complications  NKDA    Hct 32  Platelets 157  Ca 8.3     at 34 weeks  Severe preeclampsia  Anxiety    Adequate ROM at neck    Plan is labor epidural

## 2024-09-24 NOTE — PLAN OF CARE
Problem:  Fall Injury Risk  Goal: Absence of Fall, Infant Drop and Related Injury  Outcome: Progressing     Problem: Adult Inpatient Plan of Care  Goal: Plan of Care Review  Outcome: Progressing  Goal: Patient-Specific Goal (Individualized)  Outcome: Progressing  Goal: Absence of Hospital-Acquired Illness or Injury  Outcome: Progressing  Goal: Optimal Comfort and Wellbeing  Outcome: Progressing  Goal: Readiness for Transition of Care  Outcome: Progressing     Problem: Hypertensive Disorders in Pregnancy  Goal: Patient-Fetal Stabilization  Outcome: Progressing     Problem: Infection  Goal: Absence of Infection Signs and Symptoms  Outcome: Progressing

## 2024-09-24 NOTE — PROGRESS NOTES
Consulted Dr. Monroy secondary to 12 pound weight gain in 24 hours, labs and patient contractions- agreed with plan to proceed with induction of labor. Pt and male significant other notified. Verbalized understanding to all instructions and information  Questions answered to desired level of satisfaction

## 2024-09-24 NOTE — L&D DELIVERY NOTE
"[facility] -  Labor and Delivery  Vaginal Delivery   Operative Note    SUMMARY     Normal spontaneous vaginal delivery of live infant, was placed on mothers abdomen for skin to skin and bulb suctioning performed.  Infant delivered position MALCOLM over perineum.  Nuchal cord: Yes, cord reduced at perineum.    Spontaneous delivery of placenta and IV pitocin given noting  with uterine atony- bimanual compression, cytotec 800 mcg and pitocin IV infusing. Uterus found to be contracted and hemostasis maintained .  1st degree laceration noted.  Patient tolerated delivery well. Sponge needle and lap counted correctly x2.    Indications: Preeclampsia, severe, third trimester  Pregnancy complicated by:   Patient Active Problem List   Diagnosis    Exposure to COVID-19 virus    Vaginal discharge    ANGELINE (generalized anxiety disorder)    33 weeks gestation of pregnancy    Preeclampsia, severe, third trimester    34 weeks gestation of pregnancy     (spontaneous vaginal delivery)     Admitting GA: 34w1d    Delivery Information for Rober Hdez    Birth information:  YOB: 2022   Time of birth: 12:54 PM   Sex: female   Head Delivery Date/Time: 2024 12:54 PM   Delivery type: Vaginal, Spontaneous   Gestational Age: 34w1d        Delivery Providers    Delivering clinician: Marissa Hutchins CNM           Measurements    Weight:  g  Weight (lbs): 4 lb 7.6 oz  Length: 43.2 cm  Length (in): 17"         Apgars    Living status: Living  Apgar Component Scores:  1 min.:  5 min.:  10 min.:  15 min.:  20 min.:    Skin color:  0  1       Heart rate:  2  2       Reflex irritability:  2  2       Muscle tone:  2  2       Respiratory effort:  2  2       Total:  8  9                Operative Delivery    Forceps attempted?: No  Vacuum extractor attempted?: No         Shoulder Dystocia    Shoulder dystocia present?: No           Presentation    Presentation: Vertex  Position: Right Occiput Anterior   "         Interventions/Resuscitation    Method: Bulb Suctioning       Cord    Vessels: 3 vessels  Complications: Nuchal  Nuchal Intervention: reduced  Nuchal Cord Description: loose nuchal cord  Number of Loops: 1  Delayed Cord Clamping?: No  Cord Blood Disposition: Lab  Gases Sent?: No  Stem Cell Collection (by MD): No       Placenta    Placenta delivery date/time: 2024 1258  Placenta removal: Spontaneous  Placenta appearance: Intact  Placenta disposition: Pathology           Labor Events:       labor: Yes     Labor Onset Date/Time: 2024 08:50     Dilation Complete Date/Time: 2024 12:40     Start Pushing Date/Time: 2024 12:51     Rupture Date/Time: 24  0850         Rupture type: ARM (Artificial Rupture)         Fluid Amount:       Fluid Color: Clear       steroids: Full Course     Antibiotics given for GBS: Yes     Induction: amniotomy;oxytocin     Indications for induction:  Severe Preeclampsia     Augmentation:       Indications for augmentation:       Labor complications: None     Additional complications:          Cervical ripening:                     Delivery:      Episiotomy: None     Indication for Episiotomy:       Perineal Lacerations: 1st Repaired:      Periurethral Laceration:   Repaired:     Labial Laceration:   Repaired:     Sulcus Laceration:   Repaired:     Vaginal Laceration: Yes Repaired: No   Cervical Laceration:   Repaired:     Repair suture: None     Repair # of packets:       Last Value - EBL - Nursing (mL):       Sum - EBL - Nursing (mL): 0     Last Value - EBL - Anesthesia (mL):      Calculated QBL (mL): 388      Running total QBL (mL): 669      Vaginal Sweep Performed: Yes     Surgicount Correct: Yes       Other providers:       Anesthesia    Method: Epidural          Details (if applicable):  Trial of Labor      Categorization:      Priority:     Indications for :     Incision Type:       Additional   information:  Forceps:    Vacuum:    Breech:    Observed anomalies    Other (Comments): Patient was found to be completely effaced and completely dilated at a +2 station. Patient subsequently delivered a female liveborn infant via spontaneous vaginal delivery. Baby's head was delivered in MALCOLM position. A Nuchal cord was noted and easily slipped over baby's head. Anterior and posterior shoulders were delivered with ease followed by the body and was delivered with ease. Mouth and nose bulb suctioned. The baby was placed onto maternal abdomen, umbilical cord was doubly clamped and cut. Cord blood was obtained and sent. The placenta was delivered spontaneously via Adelaida, noted intact with a three-vessel cord and normal cord insertion. The uterus was massaged- uterine atony noted. Bimanual compression, pitocin infusing via IV and cytotec 800 mcg given rectally and uterus found to be until firm and well contracted. Uterine sweep performed with small amount of clots removed. Hemostasis was found to be adequate. The perineum was noted with a first degree laceration- no repair needed, stable. All sponge lap and instrument counts were correct ×2. Baby and mother are in stable condition. Mother desires to breast feed

## 2024-09-24 NOTE — ANESTHESIA PROCEDURE NOTES
Epidural    Patient location during procedure: OB   Reason for block: primary anesthetic   Reason for block: labor analgesia requested by patient and obstetrician  Diagnosis: IUP   Start time: 9/24/2024 11:53 AM  Timeout: 9/24/2024 11:52 AM  End time: 9/24/2024 12:01 PM    Staffing  Performing Provider: Kvng Quinteros MD  Authorizing Provider: Kvng Quinteros MD    Staffing  Performed by: Kvng Quinteros MD  Authorized by: Kvng Quinteros MD        Preanesthetic Checklist  Completed: patient identified, pre-op evaluation, timeout performed, anesthesia consent given, hand hygiene performed and patient being monitored  Preparation  Patient position: sitting  Prep: Betadine  Reason for block: primary anesthetic   Epidural  Skin Anesthetic: lidocaine 1%  Administration type: single shot  Approach: midline  Interspace: L4-5    Injection technique: DUANE air  Needle and Epidural Catheter  Insertion Attempts: 1  Test dose: 3 mL of lidocaine 1.5% with Epi 1-to-200,000  Additional Documentation: negative aspiration for heme and CSF  Needle localization: anatomical landmarks  Assessment  Ease of block: easy  Additional Notes  Informed consent. Aseptic technique.   L4/5 epidural catheter. Standard PCEA.   No complications.         Medications:    Medications: LIDOcaine (PF) 20 mg/mL (2%) injection - Epidural   10 mL - 9/24/2024 12:00:00 PM

## 2024-09-24 NOTE — PROGRESS NOTES
[facility] -  Labor and Delivery  Obstetrics  Labor Progress Note    Patient Name: Alana Hdez  MRN: [id number]  Admission Date: 2024  Hospital Length of Stay: 4 days  Attending Physician: Kody Smiley MD  Primary Care Provider: Arcelia, Primary Doctor    Subjective:     Principal Problem:Preeclampsia, severe, third trimester    Hospital Course:    HD#2    at 33w5d admitted  with Preeclampsia with severe features.    Patient is s/p BMZ#1 at 1800hrs  (dose #2 due thgis evening at 1800)  On Magnesium sulfate IV for seizure prophylaxis    Reports some heartburn - Pepcid IV 20mg BID ordered    Denies VB, LOF or rhythmic Ctx  Reports fetal movement    Does report some intermittent headache - none at time of evaluation  Denies blurry vision or RUQ/epigastric pain     She indicates she has had some trouble breathing but feels better today. She indicates it feels as if the baby is pushing upward. She denies any H/A, blurred vision or dizziness today. 12 pound weight gain noted since yesterday.     Obstetric HPI:  Patient reports irregular contractions, active fetal movement, absent vaginal bleeding , absent loss of fluid      Objective:     Vital Signs (Most Recent):  Temp: 98.5 °F (36.9 °C) (24 0740)  Pulse: 96 (24 0740)  Resp: 16 (24 0329)  BP: 127/80 (24 0607)  SpO2: 98 % (24 0740) Vital Signs (24h Range):  Temp:  [97.8 °F (36.6 °C)-98.8 °F (37.1 °C)] 98.5 °F (36.9 °C)  Pulse:  [] 96  Resp:  [16-18] 16  SpO2:  [96 %-100 %] 98 %  BP: ()/() 127/80     Weight: 82.7 kg (182 lb 6.4 oz)  Body mass index is 30.35 kg/m².    FHT: 130s Cat 1 (reassuring)  TOCO:  Q 3-10 minutes      Intake/Output Summary (Last 24 hours) at 2024 0840  Last data filed at 2024 0727  Gross per 24 hour   Intake 3554.56 ml   Output 800 ml   Net 2754.56 ml       Cervical Exam:  Dilation:  1  Effacement:  50%  Station: -2  Presentation: Vertex      Significant Labs:  Recent Lab Results         24  0703   24  1248        Albumin/Globulin Ratio 0.8         Albumin 2.4                  ALT 19         Anion Gap 11         Aniso Few         AST 24         Baso # 0.03         Basophil % 0.3         BILIRUBIN TOTAL 0.5         BUN 18         BUN/CREAT RATIO 21         Calcium 8.3         Chloride 109         CO2 24         Creatinine 0.87         Differential Method Auto         eGFR 94         Eos # 0.02         Eos % 0.2         Globulin, Total 2.9         Glucose 108         Hematocrit 32.1         Hemoglobin 10.0         Immature Grans (Abs) 0.23         Immature Granulocytes 2.1         Lymph # 1.98         Lymph % 18.1         Magnesium    4.5       MCH 25.3         MCHC 31.2         MCV 81.3         Mono # 1.00         Mono % 9.1         MPV 13.1         Neutrophils, Abs 7.69         Neutrophils Relative 70.2         nRBC 0.2         NUCLEATED RBC ABSOLUTE 0.02         PLATELET MORPHOLOGY Few Large Platelets         Platelet Count 157         Potassium 3.8         PROTEIN TOTAL 5.3         RBC 3.95         RDW 13.9         Sodium 140         Uric Acid 6.6         WBC 10.95                 Physical Exam:   Constitutional: She is oriented to person, place, and time. She appears well-developed and well-nourished.        Pulmonary/Chest: Effort normal.        Abdominal: Soft.             Musculoskeletal: Moves all extremeties.       Neurological: She is alert and oriented to person, place, and time.   Reflexes 3+ bilaterally, 2+ edema noted to bilaterally, moves all extremities well    Skin: Skin is warm and dry.    Psychiatric: She has a normal mood and affect. Her behavior is normal. Judgment and thought content normal.       Review of Systems  Assessment/Plan:     29 y.o. female  at 34w1d for:    * Preeclampsia, severe, third trimester    Continue Magnesium Sulfate for seizure prophylaxis  2nd dose Betamethasone at 1800 hrs    Not  on antihypertensives at this time  Monitor BP closely    Patient with multiple complaints about her gonzalez - will trial removal and use bed pan. Should pressures worsen or patient be unable to use bed pan / or we cannot obtain accurate output then gonzalez will be replaced.    As BP is stable at this time and patient is stable will allow to have a light snack then NPO w exception of clear liquids.    Likely IOL at 34 weeks should patient continue to be stable - expedite delivery for maternal or fetal indications    9/24/2024 Proceed with pitocin induction of labor . Risks and benefits discussed. Verbalized understanding        34 weeks gestation of pregnancy  Antibiotics prophylaxis until delivery    33 weeks gestation of pregnancy    33w5d     B-positive  Rubella immune  GBS unknown - will obtain GBS intrapartum swab    Cervix 1/80/-2 on admission - 1/80/-2 this am (checked due to patient complaints of pain  - likely was bladder spasm with gonzalez)            Marissa Hutchins CNM  Obstetrics  [facility] -  Labor and Delivery

## 2024-09-24 NOTE — PLAN OF CARE
Problem:  Fall Injury Risk  Goal: Absence of Fall, Infant Drop and Related Injury  Outcome: Progressing     Problem: Adult Inpatient Plan of Care  Goal: Plan of Care Review  Outcome: Progressing  Goal: Patient-Specific Goal (Individualized)  Outcome: Progressing  Goal: Absence of Hospital-Acquired Illness or Injury  Outcome: Progressing  Goal: Optimal Comfort and Wellbeing  Outcome: Progressing  Goal: Readiness for Transition of Care  Outcome: Progressing     Problem: Hypertensive Disorders in Pregnancy  Goal: Patient-Fetal Stabilization  Outcome: Progressing     Problem: Infection  Goal: Absence of Infection Signs and Symptoms  Outcome: Progressing     Problem: Labor  Goal: Hemostasis  Outcome: Progressing  Goal: Stable Fetal Wellbeing  Outcome: Progressing  Goal: Effective Progression to Delivery  Outcome: Progressing  Goal: Absence of Infection Signs and Symptoms  Outcome: Progressing  Goal: Acceptable Pain Control  Outcome: Progressing  Goal: Normal Uterine Contraction Pattern  Outcome: Progressing

## 2024-09-24 NOTE — PLAN OF CARE
Problem:  Fall Injury Risk  Goal: Absence of Fall, Infant Drop and Related Injury  2024 1559 by Tomeka Rosenbaum RN  Outcome: Progressing  2024 1557 by Tomeka Rosenbaum RN  Outcome: Progressing     Problem: Adult Inpatient Plan of Care  Goal: Plan of Care Review  2024 1559 by Tomeka Rosenbaum RN  Outcome: Progressing  2024 1557 by Tomeka Rosenbaum RN  Outcome: Progressing  Goal: Patient-Specific Goal (Individualized)  2024 1559 by Tomeka Rosenbaum RN  Outcome: Progressing  2024 1557 by Tomeka Rosenbaum, RN  Outcome: Progressing  Goal: Absence of Hospital-Acquired Illness or Injury  2024 155 by Tomeka Rosenbaum RN  Outcome: Progressing  2024 1557 by Tomeka Rosenbaum RN  Outcome: Progressing  Goal: Optimal Comfort and Wellbeing  2024 1559 by Tomeka Rosenbaum RN  Outcome: Progressing  2024 1557 by Tomeka Rosenbaum RN  Outcome: Progressing  Goal: Readiness for Transition of Care  2024 1559 by Tomeka Rosenbaum RN  Outcome: Progressing  2024 1557 by Tomeka Rosenbaum RN  Outcome: Progressing     Problem: Hypertensive Disorders in Pregnancy  Goal: Patient-Fetal Stabilization  2024 1559 by Tomeka oRsenbaum RN  Outcome: Progressing  2024 1557 by Tomeka Rosenbaum RN  Outcome: Progressing     Problem: Infection  Goal: Absence of Infection Signs and Symptoms  2024 1559 by Tomeka Rosenbaum, RN  Outcome: Progressing  2024 1557 by Tomeka Rosenbaum, RN  Outcome: Progressing     Problem: Postpartum (Vaginal Delivery)  Goal: Successful Parent Role Transition  Outcome: Progressing  Goal: Hemostasis  Outcome: Progressing  Goal: Absence of Infection Signs and Symptoms  Outcome: Progressing  Goal: Anesthesia/Sedation Recovery  Outcome: Progressing  Goal: Optimal Pain Control and Function  Outcome: Progressing  Goal: Effective Urinary Elimination  Outcome: Progressing

## 2024-09-25 LAB
BASOPHILS # BLD AUTO: 0.07 K/UL (ref 0–0.2)
BASOPHILS NFR BLD AUTO: 0.4 % (ref 0–1)
CRENATED CELLS: ABNORMAL
DIFFERENTIAL METHOD BLD: ABNORMAL
EOSINOPHIL # BLD AUTO: 0.05 K/UL (ref 0–0.5)
EOSINOPHIL NFR BLD AUTO: 0.3 % (ref 1–4)
ERYTHROCYTE [DISTWIDTH] IN BLOOD BY AUTOMATED COUNT: 13.8 % (ref 11.5–14.5)
HCT VFR BLD AUTO: 26 % (ref 38–47)
HGB BLD-MCNC: 7.9 G/DL (ref 12–16)
IMM GRANULOCYTES # BLD AUTO: 0.24 K/UL (ref 0–0.04)
IMM GRANULOCYTES NFR BLD: 1.3 % (ref 0–0.4)
LYMPHOCYTES # BLD AUTO: 2.62 K/UL (ref 1–4.8)
LYMPHOCYTES NFR BLD AUTO: 14.4 % (ref 27–41)
MCH RBC QN AUTO: 25 PG (ref 27–31)
MCHC RBC AUTO-ENTMCNC: 30.4 G/DL (ref 32–36)
MCV RBC AUTO: 82.3 FL (ref 80–96)
MONOCYTES # BLD AUTO: 1.68 K/UL (ref 0–0.8)
MONOCYTES NFR BLD AUTO: 9.2 % (ref 2–6)
MPC BLD CALC-MCNC: 13.2 FL (ref 9.4–12.4)
NEUTROPHILS # BLD AUTO: 13.53 K/UL (ref 1.8–7.7)
NEUTROPHILS NFR BLD AUTO: 74.4 % (ref 53–65)
NRBC # BLD AUTO: 0.04 X10E3/UL
NRBC, AUTO (.00): 0.2 %
OVALOCYTES BLD QL SMEAR: ABNORMAL
PLATELET # BLD AUTO: 145 K/UL (ref 150–400)
PLATELET MORPHOLOGY: ABNORMAL
RBC # BLD AUTO: 3.16 M/UL (ref 4.2–5.4)
SCHISTOCYTES BLD QL AUTO: ABNORMAL
WBC # BLD AUTO: 18.19 K/UL (ref 4.5–11)

## 2024-09-25 PROCEDURE — 36415 COLL VENOUS BLD VENIPUNCTURE: CPT | Performed by: ADVANCED PRACTICE MIDWIFE

## 2024-09-25 PROCEDURE — 11000001 HC ACUTE MED/SURG PRIVATE ROOM

## 2024-09-25 PROCEDURE — 25000003 PHARM REV CODE 250: Performed by: ADVANCED PRACTICE MIDWIFE

## 2024-09-25 PROCEDURE — 85025 COMPLETE CBC W/AUTO DIFF WBC: CPT | Performed by: ADVANCED PRACTICE MIDWIFE

## 2024-09-25 RX ORDER — LABETALOL 100 MG/1
100 TABLET, FILM COATED ORAL EVERY 12 HOURS
Status: DISCONTINUED | OUTPATIENT
Start: 2024-09-25 | End: 2024-09-26

## 2024-09-25 RX ADMIN — FERROUS SULFATE TAB 325 MG (65 MG ELEMENTAL FE) 1 EACH: 325 (65 FE) TAB at 09:09

## 2024-09-25 RX ADMIN — LABETALOL HYDROCHLORIDE 100 MG: 100 TABLET, FILM COATED ORAL at 09:09

## 2024-09-25 RX ADMIN — Medication 1 TABLET: at 08:09

## 2024-09-25 RX ADMIN — ACETAMINOPHEN AND CODEINE PHOSPHATE 1 TABLET: 300; 30 TABLET ORAL at 11:09

## 2024-09-25 RX ADMIN — SIMETHICONE 80 MG: 80 TABLET, CHEWABLE ORAL at 11:09

## 2024-09-25 RX ADMIN — FERROUS SULFATE TAB 325 MG (65 MG ELEMENTAL FE) 1 EACH: 325 (65 FE) TAB at 08:09

## 2024-09-25 RX ADMIN — LABETALOL HYDROCHLORIDE 100 MG: 100 TABLET, FILM COATED ORAL at 08:09

## 2024-09-25 NOTE — PROGRESS NOTES
POSTPARTUM PROGRESS NOTE     Alana Hdez is a 29 y.o. female Postpartum D #1 status post SVed at 34w1d in a pregnancy complicated by pre eclampsia.   She is doing well this morning, and reports mild abdominal pain that is relieved by oral pain medications. Vaginal bleeding is mild and stable. She is voiding without difficulty and is ambulating. She has passed flatus. She denies nausea, vomiting, fever or chills. Patient does plan to breast feed.   Objective:      Temp:  [97.2 °F (36.2 °C)-99.5 °F (37.5 °C)] 99.4 °F (37.4 °C)  Pulse:  [75-97] 97  Resp:  [14-18] 16  SpO2:  [98 %-99 %] 98 %  BP: (129-151)/() 136/95     General:   Awake, alert, appears her stated age, no apparent distress   Lungs:   No evidence of respiratory distress. Quiet, unlabored breathing   Heart:   Regular rate and rhythm   Abdomen:  Appears nondistended. Soft. Nontender to palpation. Uterine fundus palpable at 2 below the umbilicus.   Extremities: No bilateral LE edema.      Lab Review  Lab Results   Component Value Date    WBC 18.19 (H) 2024    HGB 7.9 (L) 2024    HCT 26.0 (L) 2024    MCV 82.3 2024     (L) 2024        I/O    Intake/Output Summary (Last 24 hours) at 2024 1800  Last data filed at 2024 0330  Gross per 24 hour   Intake --   Output 1700 ml   Net -1700 ml        Assessment:     Alana Hdez is a 29 y.o.  now Postpartum D#1 s/p vacuum assisted vaginal delivery.     Patient Active Problem List   Diagnosis    Exposure to COVID-19 virus    Vaginal discharge    ANGELINE (generalized anxiety disorder)    33 weeks gestation of pregnancy    Preeclampsia, severe, third trimester    34 weeks gestation of pregnancy     (spontaneous vaginal delivery)        Plan:     1. Routine postpartum care:  - VS WNL and stable since delivery. Continue to monitor every 4 hours  - Lochia mild and stable - continue to monitor while inpatient  - Pain control achieved with PO  medication   - Regular diet  - Encourage ambulation  - Lactation counseling following for breast feeding support  - Contraception - to be discussed at postpartum visit with primary OB provider    Dispo: As patient meets milestones, will plan to discharge tomorrow if stable.    Marissa Hutchins DNP, CNM, WHNP-BC

## 2024-09-25 NOTE — ANESTHESIA POSTPROCEDURE EVALUATION
Anesthesia Post Evaluation    Patient: Alana Hdez    Procedure(s) Performed: * No procedures listed *    Final Anesthesia Type: epidural      Patient location during evaluation: labor & delivery  Post-procedure vital signs: reviewed and stable  Pain management: adequate  Airway patency: patent  CELESTINO mitigation strategies: Use of major conduction anesthesia (spinal/epidural) or peripheral nerve block  PONV status at discharge: No PONV  Anesthetic complications: no      Cardiovascular status: hemodynamically stable  Respiratory status: unassisted  Hydration status: euvolemic  Follow-up not needed.              Vitals Value Taken Time   /92 09/25/24 1115   Temp 37.5 °C (99.5 °F) 09/25/24 1115   Pulse 88 09/25/24 1115   Resp 16 09/25/24 1115   SpO2 99 % 09/25/24 1115         No case tracking events are documented in the log.      Pain/Douglas Score: Pain Rating Prior to Med Admin: 8 (9/24/2024 12:02 PM)

## 2024-09-25 NOTE — PLAN OF CARE
Problem:  Fall Injury Risk  Goal: Absence of Fall, Infant Drop and Related Injury  Outcome: Progressing     Problem: Adult Inpatient Plan of Care  Goal: Plan of Care Review  Outcome: Progressing  Goal: Patient-Specific Goal (Individualized)  Outcome: Progressing  Goal: Absence of Hospital-Acquired Illness or Injury  Outcome: Progressing  Goal: Optimal Comfort and Wellbeing  Outcome: Progressing  Goal: Readiness for Transition of Care  Outcome: Progressing     Problem: Hypertensive Disorders in Pregnancy  Goal: Patient-Fetal Stabilization  Outcome: Progressing     Problem: Infection  Goal: Absence of Infection Signs and Symptoms  Outcome: Progressing     Problem: Postpartum (Vaginal Delivery)  Goal: Successful Parent Role Transition  Outcome: Progressing  Goal: Hemostasis  Outcome: Progressing  Goal: Absence of Infection Signs and Symptoms  Outcome: Progressing  Goal: Anesthesia/Sedation Recovery  Outcome: Progressing  Goal: Optimal Pain Control and Function  Outcome: Progressing  Goal: Effective Urinary Elimination  Outcome: Progressing

## 2024-09-25 NOTE — PLAN OF CARE
[facility] -  Labor and Delivery  Initial Discharge Assessment       Primary Care Provider: No, Primary Doctor    Admission Diagnosis: Pregnant [Z34.90]    Admission Date: 9/20/2024  Expected Discharge Date:     Transition of Care Barriers: None    Payor: MEDICAID [place] / Plan: MEDICAID [place] [employer] [employer] / Product Type: Managed Medicaid /     Extended Emergency Contact Information  Primary Emergency Contact: Naomie Martinez  Address: [address] [place] [address] [place]  Mobile Phone: [phone]  Relation: Mother    Discharge Plan A: Home  Discharge Plan B: Home      [facility] - [place], [place] - [address]  [address]  [place] [address]  Phone: [phone] Fax: [phone]    [id number] - [place], [place] - [address] AT [facility] [address]  [address]  [place] [address]  Phone: [phone] Fax: [phone]      Initial Assessment (most recent)       Adult Discharge Assessment - 09/25/24 1005          Discharge Assessment    Assessment Type Discharge Planning Assessment     Confirmed/corrected address, phone number and insurance Yes     Confirmed Demographics Correct on Facesheet     Source of Information patient     Communicated ANABELLA with patient/caregiver Date not available/Unable to determine     People in Home child(luis), dependent     Do you expect to return to your current living situation? Yes     Do you have help at home or someone to help you manage your care at home? No     Prior to hospitilization cognitive status: Unable to Assess     Current cognitive status: Alert/Oriented     Walking or Climbing Stairs Difficulty no     Dressing/Bathing Difficulty no     Home Layout Able to live on 1st floor     Equipment Currently Used at Home none     Readmission within 30 days? No     Patient currently being followed by outpatient case management? No     Do you currently have service(s) that help  you manage your care at home? No     Do you take prescription medications? No     Do you have prescription coverage? Yes     Do you have any problems affording any of your prescribed medications? No     Who is going to help you get home at discharge? family     How do you get to doctors appointments? family or friend will provide;car, drives self     Are you on dialysis? No     Do you take coumadin? No     Discharge Plan A Home     Discharge Plan B Home     DME Needed Upon Discharge  none     Discharge Plan discussed with: Patient     Transition of Care Barriers None        Physical Activity    On average, how many days per week do you engage in moderate to strenuous exercise (like a brisk walk)? 0 days     On average, how many minutes do you engage in exercise at this level? 0 min        Financial Resource Strain    How hard is it for you to pay for the very basics like food, housing, medical care, and heating? Not hard at all        Housing Stability    In the last 12 months, was there a time when you were not able to pay the mortgage or rent on time? No     At any time in the past 12 months, were you homeless or living in a shelter (including now)? No        Transportation Needs    Has the lack of transportation kept you from medical appointments, meetings, work or from getting things needed for daily living? No        Food Insecurity    Within the past 12 months, you worried that your food would run out before you got the money to buy more. Never true     Within the past 12 months, the food you bought just didn't last and you didn't have money to get more. Never true        Stress    Do you feel stress - tense, restless, nervous, or anxious, or unable to sleep at night because your mind is troubled all the time - these days? Not at all        Social Isolation    How often do you feel lonely or isolated from those around you?  Never        Alcohol Use    Q1: How often do you have a drink containing alcohol? Never      Q2: How many drinks containing alcohol do you have on a typical day when you are drinking? Patient does not drink     Q3: How often do you have six or more drinks on one occasion? Never        Utilities    In the past 12 months has the electric, gas, oil, or water company threatened to shut off services in your home? No        Health Literacy    How often do you need to have someone help you when you read instructions, pamphlets, or other written material from your doctor or pharmacy? Never                      Spoke with mother now, rc'd consult d/t to infant in NICU. Mother and infant will return home when medically stable. Accepts [facility] at this time, sent referral for infant. Will follow dc needs as arise.

## 2024-09-26 PROBLEM — D64.9 NORMOCYTIC ANEMIA: Status: ACTIVE | Noted: 2024-09-26

## 2024-09-26 PROBLEM — D64.89 OTHER SPECIFIED ANEMIAS: Status: ACTIVE | Noted: 2024-09-26

## 2024-09-26 PROBLEM — R60.0 BILATERAL LOWER EXTREMITY EDEMA: Status: ACTIVE | Noted: 2024-09-26

## 2024-09-26 LAB
ALBUMIN SERPL BCP-MCNC: 2.2 G/DL (ref 3.5–5)
ALBUMIN/GLOB SERPL: 0.8 {RATIO}
ALP SERPL-CCNC: 89 U/L (ref 37–98)
ALT SERPL W P-5'-P-CCNC: 21 U/L (ref 13–56)
ANION GAP SERPL CALCULATED.3IONS-SCNC: 8 MMOL/L (ref 7–16)
APTT PPP: 26.3 SECONDS (ref 25.2–37.3)
AST SERPL W P-5'-P-CCNC: 20 U/L (ref 15–37)
BASOPHILS # BLD AUTO: 0.04 K/UL (ref 0–0.2)
BASOPHILS NFR BLD AUTO: 0.3 % (ref 0–1)
BILIRUB SERPL-MCNC: 0.3 MG/DL (ref ?–1.2)
BUN SERPL-MCNC: 13 MG/DL (ref 7–18)
BUN/CREAT SERPL: 16 (ref 6–20)
CALCIUM SERPL-MCNC: 8.6 MG/DL (ref 8.5–10.1)
CHLORIDE SERPL-SCNC: 107 MMOL/L (ref 98–107)
CO2 SERPL-SCNC: 27 MMOL/L (ref 21–32)
CREAT SERPL-MCNC: 0.79 MG/DL (ref 0.55–1.02)
DIFFERENTIAL METHOD BLD: ABNORMAL
EGFR (NO RACE VARIABLE) (RUSH/TITUS): 105 ML/MIN/1.73M2
EOSINOPHIL # BLD AUTO: 0.11 K/UL (ref 0–0.5)
EOSINOPHIL NFR BLD AUTO: 0.9 % (ref 1–4)
ERYTHROCYTE [DISTWIDTH] IN BLOOD BY AUTOMATED COUNT: 14.1 % (ref 11.5–14.5)
GLOBULIN SER-MCNC: 2.8 G/DL (ref 2–4)
GLUCOSE SERPL-MCNC: 84 MG/DL (ref 74–106)
HCT VFR BLD AUTO: 21.9 % (ref 38–47)
HGB BLD-MCNC: 6.9 G/DL (ref 12–16)
IMM GRANULOCYTES # BLD AUTO: 0.26 K/UL (ref 0–0.04)
IMM GRANULOCYTES NFR BLD: 2.1 % (ref 0–0.4)
INR BLD: 1.05
LYMPHOCYTES # BLD AUTO: 2.53 K/UL (ref 1–4.8)
LYMPHOCYTES NFR BLD AUTO: 20.4 % (ref 27–41)
MCH RBC QN AUTO: 25.9 PG (ref 27–31)
MCHC RBC AUTO-ENTMCNC: 31.5 G/DL (ref 32–36)
MCV RBC AUTO: 82.3 FL (ref 80–96)
MONOCYTES # BLD AUTO: 0.83 K/UL (ref 0–0.8)
MONOCYTES NFR BLD AUTO: 6.7 % (ref 2–6)
MPC BLD CALC-MCNC: 12.4 FL (ref 9.4–12.4)
NEUTROPHILS # BLD AUTO: 8.65 K/UL (ref 1.8–7.7)
NEUTROPHILS NFR BLD AUTO: 69.6 % (ref 53–65)
NRBC # BLD AUTO: 0.02 X10E3/UL
NRBC, AUTO (.00): 0.2 %
PLATELET # BLD AUTO: 148 K/UL (ref 150–400)
POTASSIUM SERPL-SCNC: 4.4 MMOL/L (ref 3.5–5.1)
PROT SERPL-MCNC: 5 G/DL (ref 6.4–8.2)
PROTHROMBIN TIME: 13.6 SECONDS (ref 11.7–14.7)
RBC # BLD AUTO: 2.66 M/UL (ref 4.2–5.4)
SODIUM SERPL-SCNC: 138 MMOL/L (ref 136–145)
URATE SERPL-MCNC: 7.4 MG/DL (ref 2.6–6)
WBC # BLD AUTO: 12.42 K/UL (ref 4.5–11)

## 2024-09-26 PROCEDURE — 36430 TRANSFUSION BLD/BLD COMPNT: CPT

## 2024-09-26 PROCEDURE — 36415 COLL VENOUS BLD VENIPUNCTURE: CPT | Performed by: ADVANCED PRACTICE MIDWIFE

## 2024-09-26 PROCEDURE — 85730 THROMBOPLASTIN TIME PARTIAL: CPT | Performed by: ADVANCED PRACTICE MIDWIFE

## 2024-09-26 PROCEDURE — 25000003 PHARM REV CODE 250: Performed by: ADVANCED PRACTICE MIDWIFE

## 2024-09-26 PROCEDURE — 80053 COMPREHEN METABOLIC PANEL: CPT | Performed by: ADVANCED PRACTICE MIDWIFE

## 2024-09-26 PROCEDURE — 85025 COMPLETE CBC W/AUTO DIFF WBC: CPT | Performed by: ADVANCED PRACTICE MIDWIFE

## 2024-09-26 PROCEDURE — 86923 COMPATIBILITY TEST ELECTRIC: CPT | Mod: 91 | Performed by: ADVANCED PRACTICE MIDWIFE

## 2024-09-26 PROCEDURE — 84550 ASSAY OF BLOOD/URIC ACID: CPT | Performed by: ADVANCED PRACTICE MIDWIFE

## 2024-09-26 PROCEDURE — 99222 1ST HOSP IP/OBS MODERATE 55: CPT | Mod: ,,, | Performed by: INTERNAL MEDICINE

## 2024-09-26 PROCEDURE — P9016 RBC LEUKOCYTES REDUCED: HCPCS | Performed by: ADVANCED PRACTICE MIDWIFE

## 2024-09-26 PROCEDURE — 11000001 HC ACUTE MED/SURG PRIVATE ROOM

## 2024-09-26 PROCEDURE — 85610 PROTHROMBIN TIME: CPT | Performed by: ADVANCED PRACTICE MIDWIFE

## 2024-09-26 PROCEDURE — 30233N1 TRANSFUSION OF NONAUTOLOGOUS RED BLOOD CELLS INTO PERIPHERAL VEIN, PERCUTANEOUS APPROACH: ICD-10-PCS | Performed by: ADVANCED PRACTICE MIDWIFE

## 2024-09-26 RX ORDER — LABETALOL 200 MG/1
200 TABLET, FILM COATED ORAL EVERY 12 HOURS
Status: DISCONTINUED | OUTPATIENT
Start: 2024-09-26 | End: 2024-09-26

## 2024-09-26 RX ORDER — HYDROCODONE BITARTRATE AND ACETAMINOPHEN 500; 5 MG/1; MG/1
TABLET ORAL
Status: DISCONTINUED | OUTPATIENT
Start: 2024-09-26 | End: 2024-09-27 | Stop reason: HOSPADM

## 2024-09-26 RX ORDER — LABETALOL 100 MG/1
100 TABLET, FILM COATED ORAL ONCE
Status: COMPLETED | OUTPATIENT
Start: 2024-09-27 | End: 2024-09-27

## 2024-09-26 RX ADMIN — FERROUS SULFATE TAB 325 MG (65 MG ELEMENTAL FE) 1 EACH: 325 (65 FE) TAB at 09:09

## 2024-09-26 RX ADMIN — Medication 1 TABLET: at 09:09

## 2024-09-26 RX ADMIN — LABETALOL HYDROCHLORIDE 200 MG: 200 TABLET, FILM COATED ORAL at 09:09

## 2024-09-26 RX ADMIN — DOCUSATE SODIUM 200 MG: 100 CAPSULE, LIQUID FILLED ORAL at 09:09

## 2024-09-26 RX ADMIN — DIPHENHYDRAMINE HYDROCHLORIDE 25 MG: 25 CAPSULE ORAL at 10:09

## 2024-09-26 RX ADMIN — SODIUM CHLORIDE: 9 INJECTION, SOLUTION INTRAVENOUS at 07:09

## 2024-09-26 RX ADMIN — LABETALOL HYDROCHLORIDE 100 MG: 100 TABLET, FILM COATED ORAL at 05:09

## 2024-09-26 NOTE — HPI
Ms. Hdez is a 29 Y O female with PMH of obesity, severe pre-eclampsia who was admitted under OB service on 9/20 with severe pre-eclampsia. She was induced for labor on 9/24 and had a normal vaginal delivery that day. Her BP has remained elevated through out which led to the primary team increasing her Labetalol dose from 100 mg BID to 200 mg BID. Patient was on nifedipine for a little while but it was stopped as patient was having some altered mental status. She is back to baseline mental status, her BP have started to improve and systolic BPs are now in 120-130's. She denies chest pain, shortness of breath. She has a mild generalized headache which is better than before. She has leg swelling ever since her last trimester and it is stable per the patient. She denies exertional dyspnea or orthopnea. No focal weakness or blurred vision.

## 2024-09-26 NOTE — PLAN OF CARE
Problem:  Fall Injury Risk  Goal: Absence of Fall, Infant Drop and Related Injury  Outcome: Progressing     Problem: Adult Inpatient Plan of Care  Goal: Plan of Care Review  Outcome: Progressing  Goal: Patient-Specific Goal (Individualized)  Outcome: Progressing  Goal: Absence of Hospital-Acquired Illness or Injury  Outcome: Progressing  Goal: Optimal Comfort and Wellbeing  Outcome: Progressing  Goal: Readiness for Transition of Care  Outcome: Progressing     Problem: Hypertensive Disorders in Pregnancy  Goal: Patient-Fetal Stabilization  Outcome: Progressing     Problem: Infection  Goal: Absence of Infection Signs and Symptoms  Outcome: Progressing     Problem: Postpartum (Vaginal Delivery)  Goal: Successful Parent Role Transition  Outcome: Progressing  Goal: Hemostasis  Outcome: Progressing  Goal: Absence of Infection Signs and Symptoms  Outcome: Progressing  Goal: Anesthesia/Sedation Recovery  Outcome: Progressing  Goal: Optimal Pain Control and Function  Outcome: Progressing  Goal: Effective Urinary Elimination  Outcome: Progressing   Preparing for discharge

## 2024-09-26 NOTE — SUBJECTIVE & OBJECTIVE
Past Medical History:   Diagnosis Date    ANGELINE (generalized anxiety disorder) 7/12/2023    Other specified anemias 9/26/2024    Postpartum hypertension 9/26/2024       History reviewed. No pertinent surgical history.    Review of patient's allergies indicates:  No Known Allergies    No current facility-administered medications on file prior to encounter.     Current Outpatient Medications on File Prior to Encounter   Medication Sig    PNV,calcium 72-iron-folic acid (PRENATAL VITAMIN PLUS LOW IRON) 27 mg iron- 1 mg Tab Take 1 tablet (1 each total) by mouth once daily.    ergocalciferol (ERGOCALCIFEROL) 50,000 unit Cap Take 50,000 Units by mouth every 7 days. (Patient not taking: Reported on 9/16/2024)     Family History    None       Tobacco Use    Smoking status: Never     Passive exposure: Never    Smokeless tobacco: Never   Substance and Sexual Activity    Alcohol use: Not Currently    Drug use: Never    Sexual activity: Yes     Partners: Male     Review of Systems   All other systems reviewed and are negative.    Objective:     Vital Signs (Most Recent):  Temp: 98.3 °F (36.8 °C) (09/26/24 1530)  Pulse: 96 (09/26/24 1530)  Resp: 20 (09/26/24 1530)  BP: 128/85 (09/26/24 1530)  SpO2: 97 % (09/26/24 1530) Vital Signs (24h Range):  Temp:  [97.8 °F (36.6 °C)-98.5 °F (36.9 °C)] 98.3 °F (36.8 °C)  Pulse:  [84-97] 96  Resp:  [18-24] 20  SpO2:  [97 %-99 %] 97 %  BP: (128-182)/() 128/85     Weight: 82.7 kg (182 lb 6.4 oz)  Body mass index is 30.35 kg/m².     Physical Exam  Vitals and nursing note reviewed.   Constitutional:       Appearance: Normal appearance.   HENT:      Head: Normocephalic and atraumatic.      Mouth/Throat:      Mouth: Mucous membranes are moist.   Eyes:      Extraocular Movements: Extraocular movements intact.      Pupils: Pupils are equal, round, and reactive to light.   Cardiovascular:      Rate and Rhythm: Normal rate and regular rhythm.   Pulmonary:      Effort: Pulmonary effort is normal.       Breath sounds: Normal breath sounds.   Abdominal:      General: Bowel sounds are normal.      Palpations: Abdomen is soft.   Musculoskeletal:         General: Normal range of motion.      Cervical back: Normal range of motion and neck supple.      Right lower leg: Edema present.      Left lower leg: Edema present.   Skin:     Coloration: Skin is pale.   Neurological:      General: No focal deficit present.      Mental Status: She is alert and oriented to person, place, and time. Mental status is at baseline.   Psychiatric:         Mood and Affect: Mood normal.         Behavior: Behavior normal.          Significant Labs: All pertinent labs within the past 24 hours have been reviewed.    Significant Imaging: I have reviewed all pertinent imaging results/findings within the past 24 hours.

## 2024-09-26 NOTE — NURSING
New orders from FRANTZ Hutchins CNM  0900am labetalol increased to 200mg with 100mg to be given now.

## 2024-09-26 NOTE — LACTATION NOTE
This note was copied from a baby's chart.  Breastfeeding rounds done, mom pumping, reports breast feel calderón today, mom states she was sized up in flange size from 21 to 24mm, nipples measured as a 24mm. Mom has blister to center of left nipple, mom states she is pumping 30-45 minutes at a 6 out of 10 on her pump, mom advised to pump 15-20 minutes, encouraged to try pumping with symphony pump to see if feel relief sooner, also recommended mom sign up with [employer] for double electric pump for home use while infant in NICU

## 2024-09-26 NOTE — PROGRESS NOTES
Labs reviewed with H/H 6.1/21- type and transfuse 2 units of PRBCs. Pt informed and agrees with plan of care.

## 2024-09-26 NOTE — ASSESSMENT & PLAN NOTE
Patient denies specific symptoms of chest pain, shortness of breath.  She has a very mild headache which is improved from before.   Started on labetalol 100 mg BID and dose increased to 200 mg BID for better BP control per primary.   Recommend to increase Labetalol dose to 300 mg BID if BP not improved over next 24-48 hours, can add nifedipine if BP remains uncontrolled afterwards.  Goal BP < 140/90.

## 2024-09-26 NOTE — CONSULTS
[facility] -  Labor and Delivery  [facility] Medicine  Consult Note    Patient Name: Alana Hdez  MRN: [id number]  Admission Date: 9/20/2024  Hospital Length of Stay: 6 days  Attending Physician: Kody Smiley MD   Primary Care Provider: Torsten Paniagua Doctor           Patient information was obtained from patient, past medical records, and primary team.     Inpatient consult to Internal Medicine  Consult performed by: Keivn Urena MD  Consult ordered by: Marissa Hutchins CNM        Subjective:     Principal Problem: Postpartum hypertension    Chief Complaint:   Chief Complaint   Patient presents with    Headache    Dizziness        HPI: Ms. Hdez is a 29 Y O female with PMH of obesity, severe pre-eclampsia who was admitted under OB service on 9/20 with severe pre-eclampsia. She was induced for labor on 9/24 and had a normal vaginal delivery that day. Her BP has remained elevated through out which led to the primary team increasing her Labetalol dose from 100 mg BID to 200 mg BID. Patient was on nifedipine for a little while but it was stopped as patient was having some altered mental status. She is back to baseline mental status, her BP have started to improve and systolic BPs are now in 120-130's. She denies chest pain, shortness of breath. She has a mild generalized headache which is better than before. She has leg swelling ever since her last trimester and it is stable per the patient. She denies exertional dyspnea or orthopnea. No focal weakness or blurred vision.     Past Medical History:   Diagnosis Date    ANGELINE (generalized anxiety disorder) 7/12/2023    Other specified anemias 9/26/2024    Postpartum hypertension 9/26/2024       History reviewed. No pertinent surgical history.    Review of patient's allergies indicates:  No Known Allergies    No current facility-administered medications on file prior to encounter.     Current Outpatient Medications on File Prior to Encounter    Medication Sig    PNV,calcium 72-iron-folic acid (PRENATAL VITAMIN PLUS LOW IRON) 27 mg iron- 1 mg Tab Take 1 tablet (1 each total) by mouth once daily.    ergocalciferol (ERGOCALCIFEROL) 50,000 unit Cap Take 50,000 Units by mouth every 7 days. (Patient not taking: Reported on 9/16/2024)     Family History    None       Tobacco Use    Smoking status: Never     Passive exposure: Never    Smokeless tobacco: Never   Substance and Sexual Activity    Alcohol use: Not Currently    Drug use: Never    Sexual activity: Yes     Partners: Male     Review of Systems   All other systems reviewed and are negative.    Objective:     Vital Signs (Most Recent):  Temp: 98.3 °F (36.8 °C) (09/26/24 1530)  Pulse: 96 (09/26/24 1530)  Resp: 20 (09/26/24 1530)  BP: 128/85 (09/26/24 1530)  SpO2: 97 % (09/26/24 1530) Vital Signs (24h Range):  Temp:  [97.8 °F (36.6 °C)-98.5 °F (36.9 °C)] 98.3 °F (36.8 °C)  Pulse:  [84-97] 96  Resp:  [18-24] 20  SpO2:  [97 %-99 %] 97 %  BP: (128-182)/() 128/85     Weight: 82.7 kg (182 lb 6.4 oz)  Body mass index is 30.35 kg/m².     Physical Exam  Vitals and nursing note reviewed.   Constitutional:       Appearance: Normal appearance.   HENT:      Head: Normocephalic and atraumatic.      Mouth/Throat:      Mouth: Mucous membranes are moist.   Eyes:      Extraocular Movements: Extraocular movements intact.      Pupils: Pupils are equal, round, and reactive to light.   Cardiovascular:      Rate and Rhythm: Normal rate and regular rhythm.   Pulmonary:      Effort: Pulmonary effort is normal.      Breath sounds: Normal breath sounds.   Abdominal:      General: Bowel sounds are normal.      Palpations: Abdomen is soft.   Musculoskeletal:         General: Normal range of motion.      Cervical back: Normal range of motion and neck supple.      Right lower leg: Edema present.      Left lower leg: Edema present.   Skin:     Coloration: Skin is pale.   Neurological:      General: No focal deficit present.       Mental Status: She is alert and oriented to person, place, and time. Mental status is at baseline.   Psychiatric:         Mood and Affect: Mood normal.         Behavior: Behavior normal.          Significant Labs: All pertinent labs within the past 24 hours have been reviewed.    Significant Imaging: I have reviewed all pertinent imaging results/findings within the past 24 hours.  Assessment/Plan:     * Postpartum hypertension  Patient denies specific symptoms of chest pain, shortness of breath.  She has a very mild headache which is improved from before.   Started on labetalol 100 mg BID and dose increased to 200 mg BID for better BP control per primary.   Recommend to increase Labetalol dose to 300 mg BID if BP not improved over next 24-48 hours, can add nifedipine if BP remains uncontrolled afterwards.  Goal BP < 140/90.         Normocytic anemia  Anemia is likely due to acute blood loss which was from vaginal delivery . Most recent hemoglobin and hematocrit are listed below.  Recent Labs     09/24/24  0703 09/25/24  0334 09/26/24  1216   HGB 10.0* 7.9* 6.9*   HCT 32.1* 26.0* 21.9*     Plan  - Monitor serial CBC: Daily  - Transfuse PRBC if patient becomes hemodynamically unstable, symptomatic or H/H drops below 7/21.  - Patient has received 2 units of PRBCs on 9/26  - Patient's anemia is currently worsening. Will continue current treatment  - Management per primary.     Bilateral lower extremity edema  Denies pain in lower extremities, orthopnea.  No concern for postpartum cardiomyopathy at this point.   Monitor for any worsening signs/symptoms.       34 weeks gestation of pregnancy  S/p NVD on 9/24.  Management per primary.       Preeclampsia, severe, third trimester  S/p vaginal delivery on 9/24.        VTE Risk Mitigation (From admission, onward)           Ordered     IP VTE LOW RISK PATIENT  Once         09/20/24 4361                        Thank you for your consult. I will follow-up with patient. Please  contact us if you have any additional questions.    SHARON HOWELL MD  Department of Hospital Medicine   [facility] -  Labor and Delivery

## 2024-09-26 NOTE — HOSPITAL COURSE
9/26- Seen in consultation, home medications, PMH reviewed. Case discussed with primary team.   9/27- BP improved yesterday morning but then went back up, had minor reaction to blood transfusion as well.

## 2024-09-26 NOTE — ASSESSMENT & PLAN NOTE
Denies pain in lower extremities, orthopnea.  No concern for postpartum cardiomyopathy at this point.   Monitor for any worsening signs/symptoms.

## 2024-09-26 NOTE — ASSESSMENT & PLAN NOTE
Continue Magnesium Sulfate for seizure prophylaxis  2nd dose Betamethasone at 1800 hrs    Not on antihypertensives at this time  Monitor BP closely    Patient with multiple complaints about her gonzalez - will trial removal and use bed pan. Should pressures worsen or patient be unable to use bed pan / or we cannot obtain accurate output then gonzalez will be replaced.    As BP is stable at this time and patient is stable will allow to have a light snack then NPO w exception of clear liquids.    Likely IOL at 34 weeks should patient continue to be stable - expedite delivery for maternal or fetal indications    9/24/2024 Proceed with pitocin induction of labor . Risks and benefits discussed. Verbalized understanding    9/26/2024 Internal medicine consult for evaluation of blood pressures with labetalol 200 mg po BID. Currently breast pumping due to baby in NICU

## 2024-09-26 NOTE — SUBJECTIVE & OBJECTIVE
Interval History: PPD 2    She is not doing well this morning. She is tolerating a regular diet without nausea or vomiting. She is voiding spontaneously. She is ambulating. She has passed flatus, and has a BM. Vaginal bleeding is mild. She denies fever or chills. Abdominal pain is mild and controlled with oral medications. She Is breastfeeding. She desires circumcision for her male baby: not applicable.    Objective:     Vital Signs (Most Recent):  Temp: 98.5 °F (36.9 °C) (09/26/24 0312)  Pulse: 97 (09/26/24 0908)  Resp: 18 (09/26/24 0312)  BP: (!) 136/90 (09/26/24 0911)  SpO2: 99 % (09/25/24 2110) Vital Signs (24h Range):  Temp:  [97.8 °F (36.6 °C)-99.4 °F (37.4 °C)] 98.5 °F (36.9 °C)  Pulse:  [92-97] 97  Resp:  [16-18] 18  SpO2:  [98 %-99 %] 99 %  BP: (136-150)/() 136/90     Weight: 82.7 kg (182 lb 6.4 oz)  Body mass index is 30.35 kg/m².    No intake or output data in the 24 hours ending 09/26/24 1153      Significant Labs:  Lab Results   Component Value Date    GROUPTRH B POS 09/24/2024    HEPBSAG Non-Reactive 04/08/2024     Recent Labs   Lab 09/25/24  0334   HGB 7.9*   HCT 26.0*       CBC:   Recent Labs   Lab 09/25/24 0334   WBC 18.19*   RBC 3.16*   HGB 7.9*   HCT 26.0*   *   MCV 82.3   MCH 25.0*   MCHC 30.4*     I have personallly reviewed all pertinent lab results from the last 24 hours.    Physical Exam:   Constitutional: She is oriented to person, place, and time. She appears well-developed and well-nourished.        Pulmonary/Chest: Effort normal.        Abdominal: Soft.     Genitourinary:    Genitourinary Comments: Uterus: firm, midline, 3 FB below umbilicus  Lochia: scant rubra noted  Perineum: healing well, no edema             Musculoskeletal: Moves all extremeties.      Comments: 1+ edema noted to both lower extremities.       Neurological: She is alert and oriented to person, place, and time.    Skin: Skin is warm and dry.    Psychiatric: She has a normal mood and affect. Her behavior  is normal. Judgment and thought content normal.       Review of Systems   All other systems reviewed and are negative.

## 2024-09-26 NOTE — PROGRESS NOTES
Keep a record of headaches on the Headache diary provided during visits  Give 11ml of Tylenol or Motrin for headache not relieved by rest or cool wash cloth or severe headache. Give nausea medication provided for nausea or vomiting every 8 hours as needed for no more than 2 doses in a 24 hour period    Migraine Headache in [address] [place] [address] Road [address]:   What is a migraine headache? A migraine is a severe headache. They are common in children. The pain can be so severe that it interferes with your child's daily activities. A migraine can last a few hours up to several days. The exact cause of migraines is not known. Migraine headaches often run in families. What are the warning signs that a migraine headache is about to start? Mood changes, irritability, or lack of interest in doing usual activities    Being more tired than usual or yawning more often    Food cravings or increased thirst    Visual changes (often called auras) such as blurred vision, colors, blind spots, or bright spots    Tingling or numbness in an arm or leg    What signs and symptoms may occur with a migraine headache? Pounding, pulsing, or throbbing pain on one or both sides of your child's head    Nausea, vomiting, or abdominal pain    Sensitivity to light or noise    Noise or ringing in your child's ears    Dizziness or confusion    What can trigger a migraine headache?    Stress, eye strain, oversleeping, or not getting enough sleep    Hormone changes during a monthly period or from birth control pills in adolescent girls    Skipping meals, going too long without eating, or not drinking enough liquids    Certain foods such as cheese, chocolate, citrus fruits, processed meats, and drinks that contain caffeine    Foods that contain gluten, nitrates, MSG, or artificial sweeteners    Direct sunlight, bright or flashing lights, loud noises, smoke, or strong smells    Heat, humidity, or changes in the weather    How is a migraine headache [facility] -  Labor and Delivery  Obstetrics  Postpartum Progress Note    Patient Name: Alana Hdez  MRN: [id number]  Admission Date: 2024  Hospital Length of Stay: 6 days  Attending Physician: Kody Smiley MD  Primary Care Provider: Arcelia, Primary Doctor    Subjective:     Principal Problem:Preeclampsia, severe, third trimester    Hospital Course:    HD#2    at 33w5d admitted  with Preeclampsia with severe features.    Patient is s/p BMZ#1 at 1800hrs  (dose #2 due thgis evening at 1800)  On Magnesium sulfate IV for seizure prophylaxis    Reports some heartburn - Pepcid IV 20mg BID ordered    Denies VB, LOF or rhythmic Ctx  Reports fetal movement    Does report some intermittent headache - none at time of evaluation  Denies blurry vision or RUQ/epigastric pain     She indicates she has had some trouble breathing but feels better today. She indicates it feels as if the baby is pushing upward. She denies any H/A, blurred vision or dizziness today. 12 pound weight gain noted since yesterday. Pt was induced with pitocin to deliver vaginally a female  with Apgars 8 and 9 weighting 4 pounds 7.6 ounces. She desires to breast pump. Baby is in NICU.    Interval History: PPD 2    She is not doing well this morning. She is tolerating a regular diet without nausea or vomiting. She is voiding spontaneously. She is ambulating. She has passed flatus, and has a BM. Vaginal bleeding is mild. She denies fever or chills. Abdominal pain is mild and controlled with oral medications. She Is breastfeeding. She desires circumcision for her male baby: not applicable.    Objective:     Vital Signs (Most Recent):  Temp: 98.5 °F (36.9 °C) (24)  Pulse: 97 (2408)  Resp: 18 (24)  BP: (!) 136/90 (2411)  SpO2: 99 % (24) Vital Signs (24h Range):  Temp:  [97.8 °F (36.6 °C)-99.4 °F (37.4 °C)] 98.5 °F (36.9 °C)  Pulse:  [92-97] 97  Resp:   [16-18] 18  SpO2:  [98 %-99 %] 99 %  BP: (136-150)/() 136/90     Weight: 82.7 kg (182 lb 6.4 oz)  Body mass index is 30.35 kg/m².    No intake or output data in the 24 hours ending 24 1153      Significant Labs:  Lab Results   Component Value Date    GROUPTRH B POS 2024    HEPBSAG Non-Reactive 2024     Recent Labs   Lab 24  0334   HGB 7.9*   HCT 26.0*       CBC:   Recent Labs   Lab 24  0334   WBC 18.19*   RBC 3.16*   HGB 7.9*   HCT 26.0*   *   MCV 82.3   MCH 25.0*   MCHC 30.4*     I have personallly reviewed all pertinent lab results from the last 24 hours.    Physical Exam:   Constitutional: She is oriented to person, place, and time. She appears well-developed and well-nourished.        Pulmonary/Chest: Effort normal.        Abdominal: Soft.     Genitourinary:    Genitourinary Comments: Uterus: firm, midline, 3 FB below umbilicus  Lochia: scant rubra noted  Perineum: healing well, no edema             Musculoskeletal: Moves all extremeties.      Comments: 1+ edema noted to both lower extremities.       Neurological: She is alert and oriented to person, place, and time.    Skin: Skin is warm and dry.    Psychiatric: She has a normal mood and affect. Her behavior is normal. Judgment and thought content normal.       Review of Systems   All other systems reviewed and are negative.    Assessment/Plan:     29 y.o. female  for:    * Preeclampsia, severe, third trimester    Continue Magnesium Sulfate for seizure prophylaxis  2nd dose Betamethasone at 1800 hrs    Not on antihypertensives at this time  Monitor BP closely    Patient with multiple complaints about her gonzalez - will trial removal and use bed pan. Should pressures worsen or patient be unable to use bed pan / or we cannot obtain accurate output then gonzalez will be replaced.    As BP is stable at this time and patient is stable will allow to have a light snack then NPO w exception of clear liquids.    Likely IOL at  diagnosed? Your child's healthcare provider will ask about your child's headaches. Have your child describe the pain and any other symptoms, such as nausea. Tell the provider if you think anything triggers the pain. The provider will also want to know if pain tends to start after your child eats or drinks anything. Tell the provider about any medical conditions your child has or that run in the family. Include any recent stressors that your child has had at home or school. Your child may also need any of the following:  A neurologic exam  may be done. Your child's healthcare provider will check how your child's pupils react to light. Your child's memory, hand grasp, and balance may also be tested. MRI or CT pictures  may be done to find the cause of your child's migraine headache. Your child may be given contrast liquid so his or her brain shows up better in the pictures. Tell the healthcare provider if your child has ever had an allergic reaction to contrast liquid. Do not let your child enter the MRI room with anything metal. Metal can cause serious injury. Tell the healthcare provider if your child has any metal in or on his or her body. How is a migraine headache treated? Migraines cannot be cured, but symptoms can be relieved. The goal of treatment is to manage your child's symptoms. Medicines  may be given to help your child manage migraines. Have your child take the medicine as soon as a migraine begins, or as directed. Healthcare providers can help you decide which medicines are right for your child. Your child may need to try more than one of the following to find what works best for him or her:    NSAIDs , such as ibuprofen, help decrease swelling, pain, and fever. This medicine is available with or without a doctor's order. NSAIDs can cause stomach bleeding or kidney problems in certain people. If your child takes blood thinner medicine, always ask if NSAIDs are safe for him or her.  Always read 34 weeks should patient continue to be stable - expedite delivery for maternal or fetal indications    9/24/2024 Proceed with pitocin induction of labor . Risks and benefits discussed. Verbalized understanding    9/26/2024 Internal medicine consult for evaluation of blood pressures with labetalol 200 mg po BID. Currently breast pumping due to baby in NICU      34 weeks gestation of pregnancy  Antibiotics prophylaxis until delivery    33 weeks gestation of pregnancy    33w5d     B-positive  Rubella immune  GBS unknown - will obtain GBS intrapartum swab    Cervix 1/80/-2 on admission - 1/80/-2 this am (checked due to patient complaints of pain  - likely was bladder spasm with gonzalez)      Internal medicine consult today to preeclampsia/labile blood pressures.     Disposition: As patient meets milestones, will plan to discharge possible in next 2 days.    Marissa Hutchins CNM  Obstetrics  [facility] -  Labor and Delivery       the medicine label and follow directions. Do not give these medicines to children younger than 6 months without direction from a healthcare provider. Acetaminophen  decreases pain and fever. It is available without a doctor's order. Ask how much to give your child and how often to give it. Follow directions. Read the labels of all other medicines your child uses to see if they also contain acetaminophen, or ask your child's doctor or pharmacist. Acetaminophen can cause liver damage if not taken correctly. Do not give aspirin to children younger than 18 years. Your child could develop Reye syndrome if he or she has the flu or a fever and takes aspirin. Reye syndrome can cause life-threatening brain and liver damage. Check your child's medicine labels for aspirin or salicylates. Medicines to help prevent migraine headaches  may be given if your child has migraines often. Antinausea medicine  may be given to calm your child's stomach and to help prevent vomiting. The medicine may also help relieve pain. Cognitive behavior therapy (CBT)  can help your child learn ways to manage and prevent migraines. A therapist can teach your child to relax and to reduce stress. Your child may learn ways to create healthy nutrition, activity, and sleep habits to prevent migraines. The therapist can also help your child manage conditions that can affect migraines, such as anxiety or depression. What can I do to manage my child's migraines? Have your child rest in a dark, quiet room. This will help decrease the pain. Sleep may also help relieve the pain. Apply ice to decrease pain. Use an ice pack, or put crushed ice in a plastic bag. Cover the ice pack with a towel and place it on your child's head. Apply ice for 15 to 20 minutes every hour. Apply heat to decrease pain and muscle spasms. Use a small towel dampened with warm water or a heating pad, or have your child sit in a warm bath.  Apply heat on the area for 20 to 30 minutes every 2 hours. You may alternate heat and ice. Keep a migraine record. Write down when your child's migraines start and stop. Keep track of how often the headaches happen. Ask your child to describe the pain and where it hurts. Write down the number of days that you gave your child pain medicine. If your child has caffeine, write down how often he or she has it. Write down anything else that seems to trigger the headaches. Bring this record with you each time your child sees his or her healthcare provider. How can I help my child prevent another migraine headache? Prevent a medicine overuse headache. Have your child take pain medicines only as long as directed. A medicine may be limited to a certain amount each month. Your child's healthcare provider can help you create a plan so your child gets a safe amount each month. Help your child get enough sleep. Your child should get 8 to 10 hours of sleep each night. Help your child create a sleep schedule. Have your child go to bed and wake up at the same times each day. It may be helpful for your child to do something relaxing before bed. Do not let your child watch television right before bed. Encourage your child to get be physically active. Regular physical activity may help to prevent a migraine headache and reduce the number of headaches. Most experts recommend 1 hour of physical activity each day. Help your child get at least 30 minutes of physical activity on most days of the week. Encourage your child to eat a variety of healthy foods. Have your child eat 3 meals and 1 to 2 snacks each day at regular times. Include healthy foods such as fruit, vegetables, whole-grain breads, low-fat dairy products, beans, lean meat, and fish. Do not let your child have foods or drinks that trigger his or her migraines. Encourage your child to drink plenty of liquids.   Your child's healthcare provider may recommend 8 to 12 cups each day. Make sure these drinks do not contain caffeine. Caffeine can trigger migraines and disrupt your child's sleep pattern. Help your child manage stress. Stress can trigger migraine headaches. It may helpful to allow your child time to relax after school each day. Consider decreasing the amount of activities your child is involved in if these activities are causing stress. Talk to your child's healthcare provider about other ways to decrease your child's stress. Talk to your adolescent about not smoking. Nicotine and other chemicals in cigarettes and cigars can trigger a headache or make it worse. Keep your child away from cigarette smoke. Secondhand smoke can also trigger a migraine headache or make it worse. Ask your adolescent's healthcare provider for information if he or she currently smokes and needs help to quit. E-cigarettes or smokeless tobacco still contain nicotine. Talk to your adolescent's healthcare provider before he or she uses these products. Call your local emergency number [phone] in the [address]) or have someone call if:   Your child has a seizure. When should I seek immediate care? Your child has a severe headache with a fever or a stiff neck. Your child has new problems with vision, balance, speech, or movement. Your child has weakness in an arm or leg, or he or she cannot move it. Your child's vomiting does not stop. Your child has migraine pain that is worse than usual.    Your child's migraine headaches do not improve or get worse, even with pain medicines. When should I call my child's doctor? Your child has headaches more often, or you notice a change in when he or she gets the headaches. Your child's migraines occur in the morning with or without vomiting. Your child's migraine pain wakes him or her up from sleep. Your child's migraine pain gets worse when he or she coughs, urinates, or has a bowel movement.     Your child has regular migraine pain in the same area. You notice a change in your child's personality. You have questions or concerns about your child's condition or care. CARE AGREEMENT:   You have the right to help plan your child's care. Learn about your child's health condition and how it may be treated. Discuss treatment options with your child's healthcare providers to decide what care you want for your child. The above information is an  only. It is not intended as medical advice for individual conditions or treatments. Talk to your doctor, nurse or pharmacist before following any medical regimen to see if it is safe and effective for you. © Copyright [facility] 2023 Information is for End User's use only and may not be sold, redistributed or otherwise used for commercial purposes.

## 2024-09-26 NOTE — ASSESSMENT & PLAN NOTE
Anemia is likely due to acute blood loss which was from vaginal delivery . Most recent hemoglobin and hematocrit are listed below.  Recent Labs     09/24/24  0703 09/25/24  0334 09/26/24  1216   HGB 10.0* 7.9* 6.9*   HCT 32.1* 26.0* 21.9*     Plan  - Monitor serial CBC: Daily  - Transfuse PRBC if patient becomes hemodynamically unstable, symptomatic or H/H drops below 7/21.  - Patient has received 2 units of PRBCs on 9/26  - Patient's anemia is currently worsening. Will continue current treatment  - Management per primary.

## 2024-09-27 VITALS
DIASTOLIC BLOOD PRESSURE: 91 MMHG | OXYGEN SATURATION: 99 % | BODY MASS INDEX: 28.19 KG/M2 | RESPIRATION RATE: 18 BRPM | SYSTOLIC BLOOD PRESSURE: 134 MMHG | HEART RATE: 98 BPM | TEMPERATURE: 99 F | WEIGHT: 169.19 LBS | HEIGHT: 65 IN

## 2024-09-27 LAB
BASOPHILS # BLD AUTO: 0.05 K/UL (ref 0–0.2)
BASOPHILS NFR BLD AUTO: 0.4 % (ref 0–1)
DHEA SERPL-MCNC: NORMAL
DIFFERENTIAL METHOD BLD: ABNORMAL
EOSINOPHIL # BLD AUTO: 0.16 K/UL (ref 0–0.5)
EOSINOPHIL NFR BLD AUTO: 1.2 % (ref 1–4)
ERYTHROCYTE [DISTWIDTH] IN BLOOD BY AUTOMATED COUNT: 14 % (ref 11.5–14.5)
ESTROGEN SERPL-MCNC: NORMAL PG/ML
HCT VFR BLD AUTO: 27.5 % (ref 38–47)
HGB BLD-MCNC: 8.7 G/DL (ref 12–16)
IMM GRANULOCYTES # BLD AUTO: 0.23 K/UL (ref 0–0.04)
IMM GRANULOCYTES NFR BLD: 1.7 % (ref 0–0.4)
INSULIN SERPL-ACNC: NORMAL U[IU]/ML
LAB AP CLINICAL INFORMATION: NORMAL
LAB AP GESTATIONAL AGE: NORMAL
LAB AP GROSS DESCRIPTION: NORMAL
LAB AP LABORATORY NOTES: NORMAL
LYMPHOCYTES # BLD AUTO: 2.42 K/UL (ref 1–4.8)
LYMPHOCYTES NFR BLD AUTO: 17.8 % (ref 27–41)
MCH RBC QN AUTO: 26 PG (ref 27–31)
MCHC RBC AUTO-ENTMCNC: 31.6 G/DL (ref 32–36)
MCV RBC AUTO: 82.1 FL (ref 80–96)
MONOCYTES # BLD AUTO: 0.94 K/UL (ref 0–0.8)
MONOCYTES NFR BLD AUTO: 6.9 % (ref 2–6)
MPC BLD CALC-MCNC: 12 FL (ref 9.4–12.4)
NEUTROPHILS # BLD AUTO: 9.78 K/UL (ref 1.8–7.7)
NEUTROPHILS NFR BLD AUTO: 72 % (ref 53–65)
NRBC # BLD AUTO: 0.04 X10E3/UL
NRBC, AUTO (.00): 0.3 %
PATH INTP: NORMAL
PLATELET # BLD AUTO: 158 K/UL (ref 150–400)
RBC # BLD AUTO: 3.35 M/UL (ref 4.2–5.4)
T3RU NFR SERPL: NORMAL %
WBC # BLD AUTO: 13.58 K/UL (ref 4.5–11)

## 2024-09-27 PROCEDURE — 85025 COMPLETE CBC W/AUTO DIFF WBC: CPT | Performed by: ADVANCED PRACTICE MIDWIFE

## 2024-09-27 PROCEDURE — 36415 COLL VENOUS BLD VENIPUNCTURE: CPT | Performed by: ADVANCED PRACTICE MIDWIFE

## 2024-09-27 PROCEDURE — 99232 SBSQ HOSP IP/OBS MODERATE 35: CPT | Mod: ,,, | Performed by: INTERNAL MEDICINE

## 2024-09-27 PROCEDURE — 25000003 PHARM REV CODE 250: Performed by: ADVANCED PRACTICE MIDWIFE

## 2024-09-27 RX ORDER — LABETALOL 300 MG/1
300 TABLET, FILM COATED ORAL EVERY 12 HOURS
Qty: 60 TABLET | Refills: 11 | Status: SHIPPED | OUTPATIENT
Start: 2024-09-27 | End: 2025-09-27

## 2024-09-27 RX ORDER — FERROUS SULFATE 325(65) MG
325 TABLET ORAL 2 TIMES DAILY
Qty: 60 TABLET | Refills: 6 | Status: SHIPPED | OUTPATIENT
Start: 2024-09-27

## 2024-09-27 RX ORDER — IBUPROFEN 800 MG/1
800 TABLET ORAL EVERY 8 HOURS PRN
Qty: 60 TABLET | Refills: 1 | Status: SHIPPED | OUTPATIENT
Start: 2024-09-27

## 2024-09-27 RX ADMIN — FERROUS SULFATE TAB 325 MG (65 MG ELEMENTAL FE) 1 EACH: 325 (65 FE) TAB at 09:09

## 2024-09-27 RX ADMIN — ACETAMINOPHEN AND CODEINE PHOSPHATE 1 TABLET: 300; 30 TABLET ORAL at 12:09

## 2024-09-27 RX ADMIN — HYDROCORTISONE 2.5%: 25 CREAM TOPICAL at 03:09

## 2024-09-27 RX ADMIN — LABETALOL HYDROCHLORIDE 300 MG: 200 TABLET, FILM COATED ORAL at 09:09

## 2024-09-27 RX ADMIN — LABETALOL HYDROCHLORIDE 100 MG: 100 TABLET, FILM COATED ORAL at 12:09

## 2024-09-27 RX ADMIN — Medication 1 TABLET: at 09:09

## 2024-09-27 NOTE — ASSESSMENT & PLAN NOTE
Patient denies specific symptoms of chest pain, shortness of breath.  She has a very mild headache which is improved from before.   Started on labetalol 100 mg BID and dose increased to 200 mg BID for better BP control per primary.   Labetalol dose increased to 300 mg BID as BP remained uncontrolled over last 24 hours.  If BP remains above goal later today, recommend adding nifedipine-XL 30 mg daily and ok to discharge home with close outpatient follow up.   Goal BP < 140/90.     Hospital Medicine Team will sign off, please call with an questions.

## 2024-09-27 NOTE — DISCHARGE INSTRUCTIONS
Topic Page  Nurse Date Time Comments   PP Education Booklet Given ---- ER 09/27/2024 7387 Discharge booklet given and discussion on topics done.    Skin to skin 18 ER     Rooming in 29 ER     Importance of Exclusive Breastfeeding for 6 mo 35 ER     Bleeding 6 ER     Incision Care 10 ER     Sex 11 ER     Pain Management 8 ER     Perineal Care 9 ER     Minimizing Engorgement 40 ER     Collection & Storage of BM 42-43 ER     S/S of BF Problems  ER     Hand Expression 42 ER     Maternal s/s breast problems 41 ER     Mgnt of Common BF Problems (engorgement, sore & cracked nipples) 40-41 ER     PPD/Anxiety 14-15 ER

## 2024-09-27 NOTE — PROGRESS NOTES
[facility] -  Labor and Delivery  Obstetrics  Postpartum Progress Note    Patient Name: Alana Hdez  MRN: [id number]  Admission Date: 2024  Hospital Length of Stay: 7 days  Attending Physician: Kody Smiley MD  Primary Care Provider: Arcelia, Primary Doctor    Subjective:     Principal Problem:Postpartum hypertension    Hospital Course:    HD#2    at 33w5d admitted  with Preeclampsia with severe features.    Patient is s/p BMZ#1 at 1800hrs  (dose #2 due thgis evening at 1800)  On Magnesium sulfate IV for seizure prophylaxis    Reports some heartburn - Pepcid IV 20mg BID ordered    Denies VB, LOF or rhythmic Ctx  Reports fetal movement    Does report some intermittent headache - none at time of evaluation  Denies blurry vision or RUQ/epigastric pain     She indicates she has had some trouble breathing but feels better today. She indicates it feels as if the baby is pushing upward. She denies any H/A, blurred vision or dizziness today. 12 pound weight gain noted since yesterday. Pt was induced with pitocin to deliver vaginally a female  with Apgars 8 and 9 weighting 4 pounds 7.6 ounces. She desires to breast pump. Baby is in NICU.    2024 Blood pressures were labile and OB hospitalist was consulted. She is currently on Labetalol 300 mg po BID. She continues to breastfeed. She did receive one unit of PRBC and had a reactions- second unit of blood was not given.     Interval History: PPD 3    She is doing well this morning. She is tolerating a regular diet without nausea or vomiting. She is voiding spontaneously. She is ambulating. She has passed flatus, and has a BM. Vaginal bleeding is mild. She denies fever or chills. Abdominal pain is mild and controlled with oral medications. She Is breastfeeding. She desires circumcision for her male baby: not applicable.    Objective:     Vital Signs (Most Recent):  Temp: 98.4 °F (36.9 °C) (24 0529)  Pulse: 88  (24)  Resp: 18 (24)  BP: (!) 151/101 (24 0640)  SpO2: 99 % (24) Vital Signs (24h Range):  Temp:  [97.4 °F (36.3 °C)-98.7 °F (37.1 °C)] 98.4 °F (36.9 °C)  Pulse:  [77-99] 88  Resp:  [16-24] 18  SpO2:  [97 %-100 %] 99 %  BP: (128-184)/() 151/101     Weight: 76.7 kg (169 lb 3.2 oz)  Body mass index is 28.16 kg/m².      Intake/Output Summary (Last 24 hours) at 2024 09  Last data filed at 2024 2130  Gross per 24 hour   Intake 353 ml   Output --   Net 353 ml         Significant Labs:  Lab Results   Component Value Date    GROUPTRH B POS 2024    HEPBSAG Non-Reactive 2024     Recent Labs   Lab 24  1216   HGB 6.9*   HCT 21.9*       CBC:   Recent Labs   Lab 24  1216   WBC 12.42*   RBC 2.66*   HGB 6.9*   HCT 21.9*   *   MCV 82.3   MCH 25.9*   MCHC 31.5*       Physical Exam:   Constitutional: She is oriented to person, place, and time. She appears well-developed and well-nourished.        Pulmonary/Chest: Effort normal.          Genitourinary:    Genitourinary Comments: Uterus: firm, midline, 4 FB below umbilicus  Lochia: minimal rubra noted on perineal pad  Perineum: no edema noted                 Neurological: She is alert and oriented to person, place, and time.    Skin: Skin is warm and dry.    Psychiatric: She has a normal mood and affect. Her behavior is normal. Judgment and thought content normal.       Review of Systems   All other systems reviewed and are negative.    Assessment/Plan:     29 y.o. female  for:    34 weeks gestation of pregnancy  Antibiotics prophylaxis until delivery    Preeclampsia, severe, third trimester    Continue Magnesium Sulfate for seizure prophylaxis  2nd dose Betamethasone at 1800 hrs    Not on antihypertensives at this time  Monitor BP closely    Patient with multiple complaints about her gonzalez - will trial removal and use bed pan. Should pressures worsen or patient be unable to use bed pan / or we  cannot obtain accurate output then gonzalez will be replaced.    As BP is stable at this time and patient is stable will allow to have a light snack then NPO w exception of clear liquids.    Likely IOL at 34 weeks should patient continue to be stable - expedite delivery for maternal or fetal indications    9/24/2024 Proceed with pitocin induction of labor . Risks and benefits discussed. Verbalized understanding    9/26/2024 Internal medicine consult for evaluation of blood pressures with labetalol 200 mg po BID. Currently breast pumping due to baby in NICU      33 weeks gestation of pregnancy    33w5d     B-positive  Rubella immune  GBS unknown - will obtain GBS intrapartum swab    Cervix 1/80/-2 on admission - 1/80/-2 this am (checked due to patient complaints of pain  - likely was bladder spasm with gonzalez)      CBC today  Iron infusion today    Disposition: As patient meets milestones, will plan to discharge later today or tomorrow pending Internal medicine.    Marissa Hutchins CNM  Obstetrics  [facility] -  Labor and Delivery

## 2024-09-27 NOTE — SUBJECTIVE & OBJECTIVE
Interval History: PPD 3    She is doing well this morning. She is tolerating a regular diet without nausea or vomiting. She is voiding spontaneously. She is ambulating. She has passed flatus, and has a BM. Vaginal bleeding is mild. She denies fever or chills. Abdominal pain is mild and controlled with oral medications. She Is breastfeeding. She desires circumcision for her male baby: not applicable.    Objective:     Vital Signs (Most Recent):  Temp: 98.4 °F (36.9 °C) (09/27/24 0529)  Pulse: 88 (09/27/24 0529)  Resp: 18 (09/27/24 0529)  BP: (!) 151/101 (09/27/24 0640)  SpO2: 99 % (09/27/24 0529) Vital Signs (24h Range):  Temp:  [97.4 °F (36.3 °C)-98.7 °F (37.1 °C)] 98.4 °F (36.9 °C)  Pulse:  [77-99] 88  Resp:  [16-24] 18  SpO2:  [97 %-100 %] 99 %  BP: (128-184)/() 151/101     Weight: 76.7 kg (169 lb 3.2 oz)  Body mass index is 28.16 kg/m².      Intake/Output Summary (Last 24 hours) at 9/27/2024 0904  Last data filed at 9/26/2024 2130  Gross per 24 hour   Intake 353 ml   Output --   Net 353 ml         Significant Labs:  Lab Results   Component Value Date    GROUPTRH B POS 09/24/2024    HEPBSAG Non-Reactive 04/08/2024     Recent Labs   Lab 09/26/24  1216   HGB 6.9*   HCT 21.9*       CBC:   Recent Labs   Lab 09/26/24  1216   WBC 12.42*   RBC 2.66*   HGB 6.9*   HCT 21.9*   *   MCV 82.3   MCH 25.9*   MCHC 31.5*       Physical Exam:   Constitutional: She is oriented to person, place, and time. She appears well-developed and well-nourished.        Pulmonary/Chest: Effort normal.          Genitourinary:    Genitourinary Comments: Uterus: firm, midline, 4 FB below umbilicus  Lochia: minimal rubra noted on perineal pad  Perineum: no edema noted                 Neurological: She is alert and oriented to person, place, and time.    Skin: Skin is warm and dry.    Psychiatric: She has a normal mood and affect. Her behavior is normal. Judgment and thought content normal.       Review of Systems   All other systems  reviewed and are negative.

## 2024-09-27 NOTE — ASSESSMENT & PLAN NOTE
Anemia is likely due to acute blood loss which was from vaginal delivery . Most recent hemoglobin and hematocrit are listed below.  Recent Labs     09/25/24  0334 09/26/24  1216 09/27/24  0918   HGB 7.9* 6.9* 8.7*   HCT 26.0* 21.9* 27.5*       Plan  - Monitor serial CBC: Daily  - Transfuse PRBC if patient becomes hemodynamically unstable, symptomatic or H/H drops below 7/21.  - Patient has received 1 units of PRBCs on 9/26  - Patient's anemia is currently worsening. Will continue current treatment  - Management per primary.

## 2024-09-27 NOTE — PLAN OF CARE
Problem:  Fall Injury Risk  Goal: Absence of Fall, Infant Drop and Related Injury  Outcome: Met     Problem: Adult Inpatient Plan of Care  Goal: Plan of Care Review  Outcome: Met  Goal: Patient-Specific Goal (Individualized)  Outcome: Met  Goal: Absence of Hospital-Acquired Illness or Injury  Outcome: Met  Goal: Optimal Comfort and Wellbeing  Outcome: Met  Goal: Readiness for Transition of Care  Outcome: Met     Problem: Hypertensive Disorders in Pregnancy  Goal: Patient-Fetal Stabilization  Outcome: Met     Problem: Infection  Goal: Absence of Infection Signs and Symptoms  Outcome: Met     Problem: Postpartum (Vaginal Delivery)  Goal: Successful Parent Role Transition  Outcome: Met  Goal: Hemostasis  Outcome: Met  Goal: Absence of Infection Signs and Symptoms  Outcome: Met  Goal: Anesthesia/Sedation Recovery  Outcome: Met  Goal: Optimal Pain Control and Function  Outcome: Met  Goal: Effective Urinary Elimination  Outcome: Met

## 2024-09-27 NOTE — NURSING
Cuong JERNIGAN notified of blood pressure. Orders received for additional 100 mg labetalol to be given now. Labetalol 300 mg bid to start in morning. D/C labetalol 200mg bid.

## 2024-09-27 NOTE — DISCHARGE SUMMARY
[facility] -  Labor and Delivery  Obstetrics  Discharge Summary      Patient Name: Alana Hdez  MRN: [id number]  Admission Date: 2024  Hospital Length of Stay: 7 days  Discharge Date and Time: No discharge date for patient encounter.  Attending Physician: Kody Smiley MD   Discharging Provider: Portia Salas CNM   Primary Care Provider: Arcelia, Primary Doctor    HPI: PPD 3. Breast pumping. She denies any issues or problems presently.         * No surgery found *     Hospital Course:     HD#2    at 33w5d admitted  with Preeclampsia with severe features.    Patient is s/p BMZ#1 at 1800hrs  (dose #2 due thgis evening at 1800)  On Magnesium sulfate IV for seizure prophylaxis    Reports some heartburn - Pepcid IV 20mg BID ordered    Denies VB, LOF or rhythmic Ctx  Reports fetal movement    Does report some intermittent headache - none at time of evaluation  Denies blurry vision or RUQ/epigastric pain     She indicates she has had some trouble breathing but feels better today. She indicates it feels as if the baby is pushing upward. She denies any H/A, blurred vision or dizziness today. 12 pound weight gain noted since yesterday. Pt was induced with pitocin to deliver vaginally a female  with Apgars 8 and 9 weighting 4 pounds 7.6 ounces. She desires to breast pump. Baby is in NICU.    2024 Blood pressures were labile and OB hospitalist was consulted. She is currently on Labetalol 300 mg po BID. She continues to breastfeed. She did receive one unit of PRBC and had a reactions- second unit of blood was not given.      Consults (From admission, onward)          Status Ordering Provider     Inpatient consult to Internal Medicine  Once        Provider:  (Not yet assigned)    Completed PORTIA SALAS     Inpatient consult to Lactation  Once        Provider:  (Not yet assigned)    Acknowledged KODY SMILEY            Final Active Diagnoses:    Diagnosis  "Date Noted POA    PRINCIPAL PROBLEM:  Postpartum hypertension [O16.5] 2024 No    Normocytic anemia [D64.9] 2024 Yes    Bilateral lower extremity edema [R60.0] 2024 Yes    34 weeks gestation of pregnancy [Z3A.34] 2024 Not Applicable    Preeclampsia, severe, third trimester [O14.13] 2024 Yes      Problems Resolved During this Admission:        Significant Diagnostic Studies: Labs: CBC   Recent Labs   Lab 24  1216 24  0918   WBC 12.42* 13.58*   HGB 6.9* 8.7*   HCT 21.9* 27.5*   * 158         Feeding Method: breast    Immunizations       Date Immunization Status Dose Route/Site Given by    24 MMR Incomplete 0.5 mL Subcutaneous/     24 Tdap Incomplete 0.5 mL Intramuscular/             Delivery:    Episiotomy: None   Lacerations: 1st   Repair suture: None   Repair # of packets:     Blood loss (ml):       Birth information:  YOB: 2022   Time of birth: 12:54 PM   Sex: female   Delivery type: Vaginal, Spontaneous   Gestational Age: 34w1d     Measurements    Weight:  g  Weight (lbs): 4 lb 7.6 oz  Length: 43.2 cm  Length (in): 17"         Delivery Clinician: Delivery Providers    Delivering clinician: Marissa Hutchins CNM          Additional  information:  Forceps:    Vacuum:    Breech:    Observed anomalies      Living?:     Apgars    Living status: Living  Apgar Component Scores:  1 min.:  5 min.:  10 min.:  15 min.:  20 min.:    Skin color:  0  1       Heart rate:  2  2       Reflex irritability:  2  2       Muscle tone:  2  2       Respiratory effort:  2  2       Total:  8  9                Placenta: Delivered:       appearance  Pending Diagnostic Studies:       None            Discharged Condition: good    Disposition: Home or Self Care    Follow Up:   Follow-up Information       Marissa Hutchins CNM Follow up in 1 week(s).    Specialty: Obstetrics and Gynecology  Why: Postpartum Visit and f/u BP  Contact information:  [phone] [facility] [address]  [phone]                           Patient Instructions:      Diet Adult Regular     No driving until:   Order Comments: No driving for 2 weeks     Notify your health care provider if you experience any of the following:  temperature >100.4     Notify your health care provider if you experience any of the following:  persistent nausea and vomiting or diarrhea     Notify your health care provider if you experience any of the following:  severe uncontrolled pain     Notify your health care provider if you experience any of the following:  difficulty breathing or increased cough     Notify your health care provider if you experience any of the following:  severe persistent headache     Notify your health care provider if you experience any of the following:  worsening rash     Notify your health care provider if you experience any of the following:  persistent dizziness, light-headedness, or visual disturbances     Notify your health care provider if you experience any of the following:  increased confusion or weakness     Activity as tolerated     Medications:  Current Discharge Medication List        START taking these medications    Details   ferrous sulfate (FEOSOL) 325 mg (65 mg iron) Tab tablet Take 1 tablet (325 mg total) by mouth 2 (two) times daily.  Qty: 60 tablet, Refills: 6      ibuprofen (ADVIL,MOTRIN) 800 MG tablet Take 1 tablet (800 mg total) by mouth every 8 (eight) hours as needed.  Qty: 60 tablet, Refills: 1      labetaloL (NORMODYNE) 300 MG tablet Take 1 tablet (300 mg total) by mouth every 12 (twelve) hours.  Qty: 60 tablet, Refills: 11    Comments: .           CONTINUE these medications which have NOT CHANGED    Details   PNV,calcium 72-iron-folic acid (PRENATAL VITAMIN PLUS LOW IRON) 27 mg iron- 1 mg Tab Take 1 tablet (1 each total) by mouth once daily.  Qty: 30 tablet, Refills: 7    Associated Diagnoses: Positive urine pregnancy test           STOP taking these  medications       ergocalciferol (ERGOCALCIFEROL) 50,000 unit Cap Comments:   Reason for Stopping:         metroNIDAZOLE (FLAGYL) 500 MG tablet Comments:   Reason for Stopping:               Marissa Hutchins CNM  Obstetrics  [facility] -  Labor and Delivery

## 2024-09-27 NOTE — SUBJECTIVE & OBJECTIVE
Interval History: Patient seen and examined at the bedside, reports feeling OK, had a mild reaction to blood transfusion.     Review of Systems   All other systems reviewed and are negative.    Objective:     Vital Signs (Most Recent):  Temp: 98.7 °F (37.1 °C) (09/27/24 1118)  Pulse: 98 (09/27/24 1118)  Resp: 18 (09/27/24 0818)  BP: (!) 134/91 (09/27/24 1118)  SpO2: 99 % (09/27/24 1118) Vital Signs (24h Range):  Temp:  [97.4 °F (36.3 °C)-98.7 °F (37.1 °C)] 98.7 °F (37.1 °C)  Pulse:  [77-99] 98  Resp:  [16-20] 18  SpO2:  [97 %-100 %] 99 %  BP: (128-184)/() 134/91     Weight: 76.7 kg (169 lb 3.2 oz)  Body mass index is 28.16 kg/m².    Intake/Output Summary (Last 24 hours) at 9/27/2024 1325  Last data filed at 9/26/2024 2130  Gross per 24 hour   Intake 353 ml   Output --   Net 353 ml         Physical Exam  Vitals and nursing note reviewed.   Constitutional:       Appearance: Normal appearance.   HENT:      Head: Normocephalic and atraumatic.      Mouth/Throat:      Mouth: Mucous membranes are moist.   Eyes:      Extraocular Movements: Extraocular movements intact.      Pupils: Pupils are equal, round, and reactive to light.   Cardiovascular:      Rate and Rhythm: Normal rate and regular rhythm.   Pulmonary:      Effort: Pulmonary effort is normal.      Breath sounds: Normal breath sounds.   Abdominal:      General: Bowel sounds are normal.      Palpations: Abdomen is soft.   Musculoskeletal:         General: Normal range of motion.      Cervical back: Normal range of motion and neck supple.      Right lower leg: Edema present.      Left lower leg: Edema present.   Skin:     Coloration: Skin is pale.   Neurological:      General: No focal deficit present.      Mental Status: She is alert and oriented to person, place, and time. Mental status is at baseline.   Psychiatric:         Mood and Affect: Mood normal.         Behavior: Behavior normal.             Significant Labs: All pertinent labs within the past 24  hours have been reviewed.    Significant Imaging: I have reviewed all pertinent imaging results/findings within the past 24 hours.

## 2024-09-27 NOTE — PROGRESS NOTES
[facility] -  Labor and Delivery  [facility] Medicine  Progress Note    Patient Name: Alana Hdez  MRN: [id number]  Patient Class: IP- Inpatient   Admission Date: 9/20/2024  Length of Stay: 7 days  Attending Physician: Kody Smiley MD  Primary Care Provider: Arcelia, Primary Doctor        Subjective:     Principal Problem:Postpartum hypertension        HPI:  Ms. Hdez is a 29 Y O female with PMH of obesity, severe pre-eclampsia who was admitted under OB service on 9/20 with severe pre-eclampsia. She was induced for labor on 9/24 and had a normal vaginal delivery that day. Her BP has remained elevated through out which led to the primary team increasing her Labetalol dose from 100 mg BID to 200 mg BID. Patient was on nifedipine for a little while but it was stopped as patient was having some altered mental status. She is back to baseline mental status, her BP have started to improve and systolic BPs are now in 120-130's. She denies chest pain, shortness of breath. She has a mild generalized headache which is better than before. She has leg swelling ever since her last trimester and it is stable per the patient. She denies exertional dyspnea or orthopnea. No focal weakness or blurred vision.     Overview/Hospital Course:  9/26- Seen in consultation, home medications, PMH reviewed. Case discussed with primary team.   9/27- BP improved yesterday morning but then went back up, had minor reaction to blood transfusion as well.     Interval History: Patient seen and examined at the bedside, reports feeling OK, had a mild reaction to blood transfusion.     Review of Systems   All other systems reviewed and are negative.    Objective:     Vital Signs (Most Recent):  Temp: 98.7 °F (37.1 °C) (09/27/24 1118)  Pulse: 98 (09/27/24 1118)  Resp: 18 (09/27/24 0818)  BP: (!) 134/91 (09/27/24 1118)  SpO2: 99 % (09/27/24 1118) Vital Signs (24h Range):  Temp:  [97.4 °F (36.3 °C)-98.7 °F (37.1 °C)] 98.7 °F (37.1  °C)  Pulse:  [77-99] 98  Resp:  [16-20] 18  SpO2:  [97 %-100 %] 99 %  BP: (128-184)/() 134/91     Weight: 76.7 kg (169 lb 3.2 oz)  Body mass index is 28.16 kg/m².    Intake/Output Summary (Last 24 hours) at 9/27/2024 1325  Last data filed at 9/26/2024 2130  Gross per 24 hour   Intake 353 ml   Output --   Net 353 ml         Physical Exam  Vitals and nursing note reviewed.   Constitutional:       Appearance: Normal appearance.   HENT:      Head: Normocephalic and atraumatic.      Mouth/Throat:      Mouth: Mucous membranes are moist.   Eyes:      Extraocular Movements: Extraocular movements intact.      Pupils: Pupils are equal, round, and reactive to light.   Cardiovascular:      Rate and Rhythm: Normal rate and regular rhythm.   Pulmonary:      Effort: Pulmonary effort is normal.      Breath sounds: Normal breath sounds.   Abdominal:      General: Bowel sounds are normal.      Palpations: Abdomen is soft.   Musculoskeletal:         General: Normal range of motion.      Cervical back: Normal range of motion and neck supple.      Right lower leg: Edema present.      Left lower leg: Edema present.   Skin:     Coloration: Skin is pale.   Neurological:      General: No focal deficit present.      Mental Status: She is alert and oriented to person, place, and time. Mental status is at baseline.   Psychiatric:         Mood and Affect: Mood normal.         Behavior: Behavior normal.             Significant Labs: All pertinent labs within the past 24 hours have been reviewed.    Significant Imaging: I have reviewed all pertinent imaging results/findings within the past 24 hours.    Assessment/Plan:      * Postpartum hypertension  Patient denies specific symptoms of chest pain, shortness of breath.  She has a very mild headache which is improved from before.   Started on labetalol 100 mg BID and dose increased to 200 mg BID for better BP control per primary.   Labetalol dose increased to 300 mg BID as BP remained  uncontrolled over last 24 hours.  If BP remains above goal later today, recommend adding nifedipine-XL 30 mg daily and ok to discharge home with close outpatient follow up.   Goal BP < 140/90.     Hospital Medicine Team will sign off, please call with an questions.         Normocytic anemia  Anemia is likely due to acute blood loss which was from vaginal delivery . Most recent hemoglobin and hematocrit are listed below.  Recent Labs     09/25/24  0334 09/26/24  1216 09/27/24  0918   HGB 7.9* 6.9* 8.7*   HCT 26.0* 21.9* 27.5*       Plan  - Monitor serial CBC: Daily  - Transfuse PRBC if patient becomes hemodynamically unstable, symptomatic or H/H drops below 7/21.  - Patient has received 1 units of PRBCs on 9/26  - Patient's anemia is currently worsening. Will continue current treatment  - Management per primary.     Bilateral lower extremity edema  Denies pain in lower extremities, orthopnea.  No concern for postpartum cardiomyopathy at this point.   Monitor for any worsening signs/symptoms.       34 weeks gestation of pregnancy  S/p NVD on 9/24.  Management per primary.       Preeclampsia, severe, third trimester  S/p vaginal delivery on 9/24.        VTE Risk Mitigation (From admission, onward)           Ordered     IP VTE LOW RISK PATIENT  Once         09/20/24 1723                    Discharge Planning   ANABELLA: 9/27/2024     Code Status: Full Code   Is the patient medically ready for discharge?:     Reason for patient still in hospital (select all that apply): Patient trending condition  Discharge Plan A: Home                  SHARON HOWELL MD  Department of Hospital Medicine   [facility] -  Labor and Delivery

## 2024-09-27 NOTE — NURSING
Cuong CNM notified of post transfusion reaction. Orders to cancel 2nd unit of RBC. Blood bank notified. Orders placed for transfusion reaction protocol.

## 2024-09-28 LAB
ABO + RH BLD: NORMAL
ABO + RH BLD: NORMAL
BLD PROD TYP BPU: NORMAL
BLD PROD TYP BPU: NORMAL
BLOOD UNIT EXPIRATION DATE: NORMAL
BLOOD UNIT EXPIRATION DATE: NORMAL
BLOOD UNIT TYPE CODE: 7300
BLOOD UNIT TYPE CODE: 7300
CROSSMATCH INTERPRETATION: NORMAL
CROSSMATCH INTERPRETATION: NORMAL
DISPENSE STATUS: NORMAL
DISPENSE STATUS: NORMAL
UNIT NUMBER: NORMAL
UNIT NUMBER: NORMAL

## 2024-10-02 NOTE — PLAN OF CARE
[facility] -  Labor and Delivery  Discharge Final Note    Primary Care Provider: No, Primary Doctor    Expected Discharge Date: 9/27/2024    Final Discharge Note (most recent)       Final Note - 10/02/24 1137          Final Note    Assessment Type Final Discharge Note     Anticipated Discharge Disposition Home or Self Care        Post-Acute Status    Discharge Delays None known at this time                     Important Message from Medicare             Contact Info       Marissa Hutchins CNM   Specialty: Obstetrics and Gynecology    [address] [facility] [address]   Phone: [phone]       Next Steps: Follow up in 1 week(s)    Instructions: Postpartum Visit and f/u BP    Lamar Sesay MD   Specialty: Family Medicine    [address] [facility] [address]   Phone: [phone]       Next Steps: Schedule an appointment as soon as possible for a visit in 2 week(s)          Pt ND home

## 2024-10-09 ENCOUNTER — OFFICE VISIT (OUTPATIENT)
Dept: OBSTETRICS AND GYNECOLOGY | Facility: CLINIC | Age: 29
End: 2024-10-09
Payer: MEDICAID

## 2024-10-09 VITALS
WEIGHT: 149 LBS | TEMPERATURE: 98 F | OXYGEN SATURATION: 99 % | RESPIRATION RATE: 18 BRPM | BODY MASS INDEX: 24.83 KG/M2 | HEART RATE: 93 BPM | HEIGHT: 65 IN | DIASTOLIC BLOOD PRESSURE: 77 MMHG | SYSTOLIC BLOOD PRESSURE: 110 MMHG

## 2024-10-09 PROCEDURE — 1111F DSCHRG MED/CURRENT MED MERGE: CPT | Mod: CPTII,,, | Performed by: ADVANCED PRACTICE MIDWIFE

## 2024-10-09 PROCEDURE — 3044F HG A1C LEVEL LT 7.0%: CPT | Mod: CPTII,,, | Performed by: ADVANCED PRACTICE MIDWIFE

## 2024-10-09 PROCEDURE — 3078F DIAST BP <80 MM HG: CPT | Mod: CPTII,,, | Performed by: ADVANCED PRACTICE MIDWIFE

## 2024-10-09 PROCEDURE — 3008F BODY MASS INDEX DOCD: CPT | Mod: CPTII,,, | Performed by: ADVANCED PRACTICE MIDWIFE

## 2024-10-09 PROCEDURE — 3074F SYST BP LT 130 MM HG: CPT | Mod: CPTII,,, | Performed by: ADVANCED PRACTICE MIDWIFE

## 2024-10-09 PROCEDURE — 1159F MED LIST DOCD IN RCRD: CPT | Mod: CPTII,,, | Performed by: ADVANCED PRACTICE MIDWIFE

## 2024-10-09 PROCEDURE — 0503F POSTPARTUM CARE VISIT: CPT | Mod: ,,, | Performed by: ADVANCED PRACTICE MIDWIFE

## 2024-10-09 NOTE — PROGRESS NOTES
"CC: Post-partum follow-up    Alana Hdez is a 29 y.o. female  who presents for post-partum visit.  She is S/P a .  She and the baby are both doing well.  No pain.  No fever.   No bowel / bladder complaints.    Delivery Date: 2024  Delivery MD: Marissa Hutchins  Gender: female  Birth Weight: 4 pounds 7 ounces  Breast Feeding: YES  Depression: NO  Contraception: oral progesterone-only contraceptive    Pregnancy was complicated by:  preeclampsia    /77 (BP Location: Right arm, Patient Position: Sitting)   Pulse 93   Temp 97.9 °F (36.6 °C) (Oral)   Resp 18   Ht 5' 5" (1.651 m)   Wt 67.6 kg (149 lb)   LMP 2024 (Exact Date)   SpO2 99%   Breastfeeding Yes   BMI 24.79 kg/m²     ROS:  GENERAL: No fever, chills, fatigability.  VULVAR: No pain, no lesions and no itching.  VAGINAL: No relaxation, no itching, no discharge, no abnormal bleeding and no lesions.  ABDOMEN: No abdominal pain. Denies nausea. Denies vomiting. No diarrhea. No constipation  BREAST: Denies pain. No lumps. No discharge.  URINARY: No incontinence, no nocturia, no frequency and no dysuria.  CARDIOVASCULAR: No chest pain. No shortness of breath. No leg cramps.  NEUROLOGICAL: No headaches. No vision changes.    PHYSICAL EXAM:  ABDOMEN:  Soft, non-tender, non-distended  VULVA:  Normal, no lesions  CERVIX:  deferred  UTERUS:  Normal size, shape, consistency, no mass or tenderness.  ADNEXA:  deferred    IMP:  2 Weeks Postpartum  Status Post   Encounter for postpartum visit        ICD-10-CM ICD-9-CM    1. Encounter for postpartum visit  Z39.2 V24.2           PLAN:  May resume normal activities after 6 weeks postpartum/post  section  May be released to return to work 6 weeks postpartum/post  section  Birth control options and counseling discussed  Verbalized understanding to all information and instructions  Questions answered to desired level of satisfaction    Follow up in about 2 weeks (around " 10/23/2024), or if symptoms worsen or fail to improve, for Postpartum visit with pap smear.

## 2024-10-14 ENCOUNTER — HOSPITAL ENCOUNTER (EMERGENCY)
Facility: HOSPITAL | Age: 29
Discharge: HOME | End: 2024-10-14
Payer: MEDICAID

## 2024-10-14 ENCOUNTER — TELEPHONE (OUTPATIENT)
Dept: OBSTETRICS AND GYNECOLOGY | Facility: CLINIC | Age: 29
End: 2024-10-14
Payer: MEDICAID

## 2024-10-14 VITALS
DIASTOLIC BLOOD PRESSURE: 91 MMHG | OXYGEN SATURATION: 98 % | SYSTOLIC BLOOD PRESSURE: 134 MMHG | BODY MASS INDEX: 23.32 KG/M2 | HEART RATE: 89 BPM | HEIGHT: 65 IN | RESPIRATION RATE: 18 BRPM | WEIGHT: 140 LBS | TEMPERATURE: 99 F

## 2024-10-14 DIAGNOSIS — K80.20 GALLSTONES: Primary | ICD-10-CM

## 2024-10-14 DIAGNOSIS — Z36.89 ENCOUNTER FOR OTHER SPECIFIED ANTENATAL SCREENING: ICD-10-CM

## 2024-10-14 DIAGNOSIS — R07.9 CHEST PAIN: ICD-10-CM

## 2024-10-14 LAB
ALBUMIN SERPL BCP-MCNC: 3.7 G/DL (ref 3.5–5)
ALBUMIN/GLOB SERPL: 0.9 {RATIO}
ALP SERPL-CCNC: 230 U/L (ref 37–98)
ALT SERPL W P-5'-P-CCNC: 195 U/L (ref 13–56)
AMYLASE SERPL-CCNC: 69 U/L (ref 25–115)
ANION GAP SERPL CALCULATED.3IONS-SCNC: 9 MMOL/L (ref 7–16)
AST SERPL W P-5'-P-CCNC: 150 U/L (ref 15–37)
BASOPHILS # BLD AUTO: 0.09 K/UL (ref 0–0.2)
BASOPHILS NFR BLD AUTO: 1.7 % (ref 0–1)
BILIRUB SERPL-MCNC: 2 MG/DL (ref ?–1.2)
BILIRUB UR QL STRIP: 0.5
BUN SERPL-MCNC: 14 MG/DL (ref 7–18)
BUN/CREAT SERPL: 14 (ref 6–20)
CALCIUM SERPL-MCNC: 9.1 MG/DL (ref 8.5–10.1)
CHLORIDE SERPL-SCNC: 107 MMOL/L (ref 98–107)
CLARITY UR: CLEAR
CO2 SERPL-SCNC: 27 MMOL/L (ref 21–32)
COLOR UR: YELLOW
CREAT SERPL-MCNC: 1.02 MG/DL (ref 0.55–1.02)
CREAT UR-MCNC: 322 MG/DL (ref 28–219)
DIFFERENTIAL METHOD BLD: ABNORMAL
EGFR (NO RACE VARIABLE) (RUSH/TITUS): 77 ML/MIN/1.73M2
EOSINOPHIL # BLD AUTO: 0.11 K/UL (ref 0–0.5)
EOSINOPHIL NFR BLD AUTO: 2 % (ref 1–4)
ERYTHROCYTE [DISTWIDTH] IN BLOOD BY AUTOMATED COUNT: 15.3 % (ref 11.5–14.5)
GLOBULIN SER-MCNC: 4.2 G/DL (ref 2–4)
GLUCOSE SERPL-MCNC: 116 MG/DL (ref 74–106)
GLUCOSE UR STRIP-MCNC: NORMAL MG/DL
HCT VFR BLD AUTO: 39.8 % (ref 38–47)
HGB BLD-MCNC: 11.9 G/DL (ref 12–16)
IMM GRANULOCYTES # BLD AUTO: 0.01 K/UL (ref 0–0.04)
IMM GRANULOCYTES NFR BLD: 0.2 % (ref 0–0.4)
KETONES UR STRIP-SCNC: ABNORMAL MG/DL
LEUKOCYTE ESTERASE UR QL STRIP: ABNORMAL
LIPASE SERPL-CCNC: 58 U/L (ref 16–77)
LYMPHOCYTES # BLD AUTO: 1.57 K/UL (ref 1–4.8)
LYMPHOCYTES NFR BLD AUTO: 28.8 % (ref 27–41)
MAGNESIUM SERPL-MCNC: 2.5 MG/DL (ref 1.7–2.3)
MCH RBC QN AUTO: 25.5 PG (ref 27–31)
MCHC RBC AUTO-ENTMCNC: 29.9 G/DL (ref 32–36)
MCV RBC AUTO: 85.2 FL (ref 80–96)
MONOCYTES # BLD AUTO: 0.33 K/UL (ref 0–0.8)
MONOCYTES NFR BLD AUTO: 6.1 % (ref 2–6)
MPC BLD CALC-MCNC: 12.6 FL (ref 9.4–12.4)
MUCOUS, UA: ABNORMAL /LPF
NEUTROPHILS # BLD AUTO: 3.34 K/UL (ref 1.8–7.7)
NEUTROPHILS NFR BLD AUTO: 61.2 % (ref 53–65)
NITRITE UR QL STRIP: NEGATIVE
NRBC # BLD AUTO: 0 X10E3/UL
NRBC, AUTO (.00): 0 %
NT-PROBNP SERPL-MCNC: 40 PG/ML (ref 1–125)
PH UR STRIP: 6 PH UNITS
PLATELET # BLD AUTO: 278 K/UL (ref 150–400)
POTASSIUM SERPL-SCNC: 4.2 MMOL/L (ref 3.5–5.1)
PROT SERPL-MCNC: 7.9 G/DL (ref 6.4–8.2)
PROT UR QL STRIP: 50
PROT UR-MCNC: 37.8 MG/DL (ref 0–11.9)
PROT/CREAT 24H UR-RTO: 0.12
RBC # BLD AUTO: 4.67 M/UL (ref 4.2–5.4)
RBC # UR STRIP: ABNORMAL /UL
RBC #/AREA URNS HPF: 10 /HPF
SODIUM SERPL-SCNC: 139 MMOL/L (ref 136–145)
SP GR UR STRIP: 1.03
SQUAMOUS #/AREA URNS LPF: ABNORMAL /HPF
TROPONIN I SERPL DL<=0.01 NG/ML-MCNC: 4.6 PG/ML
TROPONIN I SERPL DL<=0.01 NG/ML-MCNC: <4 PG/ML
URATE SERPL-MCNC: 7.1 MG/DL (ref 2.6–6)
UROBILINOGEN UR STRIP-ACNC: 4 MG/DL
WBC # BLD AUTO: 5.45 K/UL (ref 4.5–11)
WBC #/AREA URNS HPF: 7 /HPF

## 2024-10-14 PROCEDURE — 81001 URINALYSIS AUTO W/SCOPE: CPT

## 2024-10-14 PROCEDURE — 83690 ASSAY OF LIPASE: CPT

## 2024-10-14 PROCEDURE — 84484 ASSAY OF TROPONIN QUANT: CPT

## 2024-10-14 PROCEDURE — 93005 ELECTROCARDIOGRAM TRACING: CPT

## 2024-10-14 PROCEDURE — 83880 ASSAY OF NATRIURETIC PEPTIDE: CPT

## 2024-10-14 PROCEDURE — 36415 COLL VENOUS BLD VENIPUNCTURE: CPT

## 2024-10-14 PROCEDURE — 81003 URINALYSIS AUTO W/O SCOPE: CPT

## 2024-10-14 PROCEDURE — 83735 ASSAY OF MAGNESIUM: CPT

## 2024-10-14 PROCEDURE — 84550 ASSAY OF BLOOD/URIC ACID: CPT

## 2024-10-14 PROCEDURE — 25000003 PHARM REV CODE 250

## 2024-10-14 PROCEDURE — 82150 ASSAY OF AMYLASE: CPT

## 2024-10-14 PROCEDURE — 80053 COMPREHEN METABOLIC PANEL: CPT

## 2024-10-14 PROCEDURE — 99285 EMERGENCY DEPT VISIT HI MDM: CPT | Mod: 25

## 2024-10-14 PROCEDURE — 84156 ASSAY OF PROTEIN URINE: CPT

## 2024-10-14 PROCEDURE — 85025 COMPLETE CBC W/AUTO DIFF WBC: CPT

## 2024-10-14 RX ORDER — ACETAMINOPHEN 500 MG
1000 TABLET ORAL
Status: COMPLETED | OUTPATIENT
Start: 2024-10-14 | End: 2024-10-14

## 2024-10-14 RX ADMIN — ACETAMINOPHEN 1000 MG: 500 TABLET ORAL at 08:10

## 2024-10-14 NOTE — TELEPHONE ENCOUNTER
Spoke with patient regarding pain under breast . She hs an appointment for Wednesday.    ----- Message from Med Assistant Bansal sent at 10/14/2024 12:38 PM CDT -----  Who Called: Alana Hdez    Caller is requesting assistance/information from provider's office.    Symptoms (please be specific): pain under right breast  How long has patient had these symptoms:  4 to 5 days      Preferred Method of Contact: Phone Call  Patient's Preferred Phone Number on File: [phone]   Best Call Back Number, if different:   Additional Information: pain under right breast for about 4 to 5 days

## 2024-10-14 NOTE — Clinical Note
Naomie Martinez accompanied their mother to the emergency department on 10/14/2024. They may return to work on 10/16/2024.      If you have any questions or concerns, please don't hesitate to call.      Art Dunham, NP

## 2024-10-14 NOTE — ED PROVIDER NOTES
Encounter Date: 10/14/2024       History     Chief Complaint   Patient presents with    Chest Pain    Shortness of Breath     With execration.        29-year old female presents to the emergency department two weeks post partum for evaluation of right lower chest pain and shortness of breath. Patient reports that the symptoms began this morning and are worse when moving around. States that she had a normal vaginal delivery two weeks ago by Dr. Marissa Hutchins and was diagnosed with pre-eclampsia and gestational hypertension mid-pregnancy and has continued to be followed by Dr. Hutchins. Denies fever, chill, cough, congestion, runny nose, nausea, vomiting, abdominal pain, back pain, vaginal pain, dysuria.    The history is provided by the patient. No  was used.   Chest Pain  The current episode started today. Chest pain occurs intermittently. The chest pain is unchanged. The pain is associated with exertion. At its most intense, the chest pain is at 6/10. The quality of the pain is described as aching. The pain does not radiate. Chest pain is worsened by certain positions. Primary symptoms include shortness of breath. Pertinent negatives for primary symptoms include no fever, no wheezing, no palpitations, no nausea, no vomiting, no dizziness and no altered mental status.   The shortness of breath began today. The shortness of breath developed suddenly.   Pertinent negatives for associated symptoms include no lower extremity edema, no orthopnea and no weakness. She tried nothing for the symptoms. There are no known risk factors.     Review of patient's allergies indicates:  No Known Allergies  Past Medical History:   Diagnosis Date    ANGELINE (generalized anxiety disorder) 7/12/2023    Other specified anemias 9/26/2024    Postpartum hypertension 9/26/2024     History reviewed. No pertinent surgical history.  No family history on file.  Social History     Tobacco Use    Smoking status: Never     Passive  exposure: Never    Smokeless tobacco: Never   Substance Use Topics    Alcohol use: Not Currently    Drug use: Never     Review of Systems   Constitutional:  Negative for chills and fever.   Eyes:  Negative for photophobia and pain.   Respiratory:  Positive for shortness of breath. Negative for chest tightness, wheezing and stridor.    Cardiovascular:  Positive for chest pain. Negative for palpitations, orthopnea and leg swelling.   Gastrointestinal:  Negative for nausea and vomiting.   Genitourinary:  Negative for decreased urine volume, difficulty urinating and dysuria.   Musculoskeletal:  Negative for back pain, neck pain and neck stiffness.   Neurological:  Negative for dizziness, tremors, weakness and headaches.   Psychiatric/Behavioral:  Negative for confusion.    All other systems reviewed and are negative.      Physical Exam     Initial Vitals [10/14/24 1629]   BP Pulse Resp Temp SpO2   121/89 95 17 98.7 °F (37.1 °C) 99 %      MAP       --         Physical Exam    Vitals reviewed.  Constitutional: She appears well-nourished. No distress.   HENT:   Head: Normocephalic.   Eyes: Conjunctivae are normal. Right eye exhibits no discharge. Left eye exhibits no discharge.   Neck: Neck supple.   Normal range of motion.  Cardiovascular:  Normal rate, regular rhythm and normal heart sounds.           Pulmonary/Chest: Effort normal and breath sounds normal. No accessory muscle usage. No tachypnea. No respiratory distress. She has no wheezes. She exhibits no mass, no tenderness, no crepitus, no edema, no deformity and no swelling.   Abdominal: Abdomen is soft. Bowel sounds are normal. She exhibits no distension. There is no abdominal tenderness. There is no guarding.   Musculoskeletal:         General: Normal range of motion.      Cervical back: Normal range of motion and neck supple.     Lymphadenopathy:     She has no cervical adenopathy.   Neurological: She is alert and oriented to person, place, and time. No sensory  deficit. GCS score is 15. GCS eye subscore is 4. GCS verbal subscore is 5. GCS motor subscore is 6.   Skin: Skin is warm and dry. Capillary refill takes less than 2 seconds.   Small, 1cm in diameter, discoloration noted under right breast consistent with patient's area of chest pain. No bleeding, drainage, abscess noted.   Psychiatric: She has a normal mood and affect. Her behavior is normal.         Medical Screening Exam   See Full Note    ED Course   Procedures  Labs Reviewed   COMPREHENSIVE METABOLIC PANEL - Abnormal       Result Value    Sodium 139      Potassium 4.2      Chloride 107      CO2 27      Anion Gap 9      Glucose 116 (*)     BUN 14      Creatinine 1.02      BUN/Creatinine Ratio 14      Calcium 9.1      Total Protein 7.9      Albumin 3.7      Globulin 4.2 (*)     A/G Ratio 0.9      Bilirubin, Total 2.0 (*)     Alk Phos 230 (*)      (*)      (*)     eGFR 77     MAGNESIUM - Abnormal    Magnesium 2.5 (*)    URINALYSIS, REFLEX TO URINE CULTURE - Abnormal    Color, UA Yellow      Clarity, UA Clear      pH, UA 6.0      Leukocytes, UA Trace (*)     Nitrites, UA Negative      Protein, UA 50 (*)     Glucose, UA Normal      Ketones, UA Trace      Urobilinogen, UA 4 (*)     Bilirubin, UA 0.5 (*)     Blood, UA Small (*)     Specific Gravity, UA 1.034 (*)    CBC WITH DIFFERENTIAL - Abnormal    WBC 5.45      RBC 4.67      Hemoglobin 11.9 (*)     Hematocrit 39.8      MCV 85.2      MCH 25.5 (*)     MCHC 29.9 (*)     RDW 15.3 (*)     Platelet Count 278      MPV 12.6 (*)     Neutrophils % 61.2      Lymphocytes % 28.8      Monocytes % 6.1 (*)     Eosinophils % 2.0      Basophils % 1.7 (*)     Immature Granulocytes % 0.2      nRBC, Auto 0.0      Neutrophils, Abs 3.34      Lymphocytes, Absolute 1.57      Monocytes, Absolute 0.33      Eosinophils, Absolute 0.11      Basophils, Absolute 0.09      Immature Granulocytes, Absolute 0.01      nRBC, Absolute 0.00      Diff Type Auto     URINALYSIS, MICROSCOPIC -  Abnormal    WBC, UA 7 (*)     RBC, UA 10 (*)     Squamous Epithelial Cells, UA Occasional (*)     Mucous Occasional (*)    URIC ACID - Abnormal    Uric Acid 7.1 (*)    PROTEIN / CREATININE RATIO, URINE - Abnormal    Creatinine, Urine 322 (*)     Protein, Urine 37.8 (*)     Protein/Creatinine Ratio 0.117     TROPONIN I - Normal    Troponin I High Sensitivity 4.6     NT-PRO NATRIURETIC PEPTIDE - Normal    ProBNP 40     TROPONIN I - Normal    Troponin I High Sensitivity <4.0     LIPASE - Normal    Lipase 58     AMYLASE - Normal    Amylase 69     CBC W/ AUTO DIFFERENTIAL    Narrative:     The following orders were created for panel order CBC auto differential.  Procedure                               Abnormality         Status                     ---------                               -----------         ------                     CBC with Differential[[id number]]       Abnormal            Final result                 Please view results for these tests on the individual orders.   GENERAL MISCELLANEOUS TEST (BEAKER)          Imaging Results              US Abdomen Limited_Gallbladder (Final result)  Result time 10/14/24 22:03:14      Final result by Taniya Huffman MD (10/14/24 22:03:14)                   Impression:      Cholelithiasis without evidence of cholecystitis.      Electronically signed by: Taniya Huffman  Date:    10/14/2024  Time:    22:03               Narrative:    EXAMINATION:  US ABDOMEN LIMITED_GALLBLADDER    CLINICAL HISTORY:  right lower chest pain;    TECHNIQUE:  Limited ultrasound of the right upper quadrant of the abdomen focused on the biliary system was performed.    COMPARISON:  None    FINDINGS:  Gallbladder: There are multiple mobile gallstones with the largest measuring 5 mm.    Biliary system: The common duct is not dilated, measuring 4.7  mm.  No intrahepatic ductal dilatation.    Pancreas: Obscured by gas.    Visualized aorta is unremarkable.    Spleen measures 8.4  cm    Miscellaneous: No upper abdominal ascites and no abnormalities of the visualized liver.  Right kidney is unremarkable as imaged.                                       X-Ray Chest PA And Lateral (Final result)  Result time 10/14/24 18:05:24      Final result by Juan Knox MD (10/14/24 18:05:24)                   Impression:      No acute process.      Electronically signed by: Juan Knox MD  Date:    10/14/2024  Time:    18:05               Narrative:    EXAMINATION:  XR CHEST PA AND LATERAL    CLINICAL HISTORY:  Chest pain, unspecified    TECHNIQUE:  PA and lateral views of the chest were performed.    COMPARISON:  09/24/2024.    FINDINGS:  The trachea is unremarkable.  The cardiomediastinal silhouette is within normal limits.  The hilar structures are unremarkable.  There is no evidence of free air beneath the hemidiaphragms.  There are no pleural effusions.  There is no evidence of a pneumothorax.  There is no evidence of pneumomediastinum.  No airspace opacity is present.  The osseous structures are unremarkable.                                       Medications   acetaminophen tablet 1,000 mg (1,000 mg Oral Given 10/14/24 2021)     Medical Decision Making  29-year old female presents to the emergency department two weeks post partum for evaluation of right lower chest pain and shortness of breath. Patient reports that the symptoms began this morning and are worse when moving around. States that she had a normal vaginal delivery two weeks ago by Dr. Marissa Hutchins and was diagnosed with pre-eclampsia and gestational hypertension mid-pregnancy and has continued to be followed by Dr. Hutchins. Denies fever, chill, cough, congestion, runny nose, nausea, vomiting, abdominal pain, back pain, vaginal pain, dysuria.  Ordered and reviewed EKG with results significant for normal sinus rhythm at 91 beats per minute  Ordered and reviewed labs  Ordered and reviewed urinalysis  Ordered and reviewed chest xray as well  radiologist's interpretation with results significant for no acute process   Spoke with Dr. Webb regarding patient who evaluated patient in the ED and recommended gallbladder ultrasound, amylase and lipase  Ordered and reviewed gallbladder ultrasound as well as radiologist's interpretation with results significant for cholelithiasis without evidence of cholecystitis.  Medication ordered in ED  Follow up and return precautions discussed with patient and patient's mother, who both verbalized understanding  Ambulatory referral given to general surgery for discharge  Diagnosis: Cholelithiasis    Amount and/or Complexity of Data Reviewed  Labs: ordered. Decision-making details documented in ED Course.  Radiology: ordered.    Risk  OTC drugs.               ED Course as of 10/14/24 2332   Mon Oct 14, 2024   1838 BUN/CREAT RATIO: 14 [JL]      ED Course User Index  [JL] Art Dunham NP                           Clinical Impression:   Final diagnoses:  [R07.9] Chest pain  [K80.20] Gallstones (Primary)        ED Disposition Condition    Discharge Stable          ED Prescriptions    None       Follow-up Information    None          Art Dunham, MANNY  10/14/24 7556

## 2024-10-15 ENCOUNTER — TELEPHONE (OUTPATIENT)
Dept: OBSTETRICS AND GYNECOLOGY | Facility: CLINIC | Age: 29
End: 2024-10-15
Payer: MEDICAID

## 2024-10-15 ENCOUNTER — TELEPHONE (OUTPATIENT)
Dept: EMERGENCY MEDICINE | Facility: HOSPITAL | Age: 29
End: 2024-10-15
Payer: MEDICAID

## 2024-10-15 LAB
OHS QRS DURATION: 70 MS
OHS QTC CALCULATION: 430 MS

## 2024-10-15 NOTE — TELEPHONE ENCOUNTER
Patient states she no longer needs appointment and want to cancel  since she knows it's her gallbladder and not the breast.    ----- Message from Med Assistant Bansal sent at 10/15/2024 11:53 AM CDT -----  Who Called: Alana Hdez    Caller is requesting assistance/information from provider's office.        Preferred Method of Contact: Phone Call  Patient's Preferred Phone Number on File: [phone]   Best Call Back Number, if different:  Additional Information: pt did go to er and it was gallstones, does she need her appt for tomorrow?

## 2024-10-15 NOTE — DISCHARGE INSTRUCTIONS
Take tylenol and motrin every four to six hours as needed for pain. Follow up with general surgery, they will call you with an appointment date/time. Return to the emergency department for new or worsening symptoms.

## 2024-10-23 ENCOUNTER — POSTPARTUM VISIT (OUTPATIENT)
Dept: OBSTETRICS AND GYNECOLOGY | Facility: CLINIC | Age: 29
End: 2024-10-23
Payer: MEDICAID

## 2024-10-23 VITALS
WEIGHT: 153.19 LBS | DIASTOLIC BLOOD PRESSURE: 77 MMHG | HEIGHT: 65 IN | HEART RATE: 89 BPM | RESPIRATION RATE: 18 BRPM | TEMPERATURE: 98 F | SYSTOLIC BLOOD PRESSURE: 120 MMHG | BODY MASS INDEX: 25.52 KG/M2 | OXYGEN SATURATION: 99 %

## 2024-10-23 DIAGNOSIS — Z71.89 ENCOUNTER FOR BREAST FEEDING COUNSELING: ICD-10-CM

## 2024-10-23 DIAGNOSIS — K80.20 GALLSTONES: ICD-10-CM

## 2024-10-23 DIAGNOSIS — Z30.011 ENCOUNTER FOR INITIAL PRESCRIPTION OF CONTRACEPTIVE PILLS: ICD-10-CM

## 2024-10-23 PROCEDURE — 0503F POSTPARTUM CARE VISIT: CPT | Mod: ,,, | Performed by: ADVANCED PRACTICE MIDWIFE

## 2024-10-23 PROCEDURE — 1111F DSCHRG MED/CURRENT MED MERGE: CPT | Mod: CPTII,,, | Performed by: ADVANCED PRACTICE MIDWIFE

## 2024-10-23 RX ORDER — METOCLOPRAMIDE 10 MG/1
10 TABLET ORAL
Qty: 90 TABLET | Refills: 1 | Status: SHIPPED | OUTPATIENT
Start: 2024-10-23 | End: 2025-10-23

## 2024-10-23 RX ORDER — DROSPIRENONE 4 MG/1
4 TABLET, FILM COATED ORAL DAILY
Qty: 28 TABLET | Refills: 12 | Status: SHIPPED | OUTPATIENT
Start: 2024-10-23

## 2024-10-23 NOTE — PROGRESS NOTES
"CC: Post-partum follow-up    Alana Hdez is a 29 y.o. female  who presents for post-partum visit.  She is S/P a .  She and the baby are both doing well.  No pain.  No fever.   No bowel / bladder complaints. States she was seen at there ER for gallstones and have not received the surgery consult with a provider.     Delivery Date: 2024  Delivery MD: Marissa Hutchins  Gender: male  Birth Weight: 4 pounds 7 ounces  Breast Feeding: YES  Depression: NO  Contraception: oral progesterone-only contraceptive    Pregnancy was complicated by:  N/a    /77 (Patient Position: Sitting)   Pulse 89   Temp 97.7 °F (36.5 °C) (Oral)   Resp 18   Ht 5' 5" (1.651 m)   Wt 69.5 kg (153 lb 3.2 oz)   LMP 2024 (Exact Date)   SpO2 99%   Breastfeeding Yes   BMI 25.49 kg/m²     ROS:  GENERAL: No fever, chills, fatigability.  VULVAR: No pain, no lesions and no itching.  VAGINAL: No relaxation, no itching, no discharge, no abnormal bleeding and no lesions.  ABDOMEN: No abdominal pain. Denies nausea. Denies vomiting. No diarrhea. No constipation  BREAST: Denies pain. No lumps. No discharge.  URINARY: No incontinence, no nocturia, no frequency and no dysuria.  CARDIOVASCULAR: No chest pain. No shortness of breath. No leg cramps.  NEUROLOGICAL: No headaches. No vision changes.    PHYSICAL EXAM:  ABDOMEN:  Soft, non-tender, non-distended  VULVA:  Normal, no lesions  CERVIX:  Without lesions, polyps or tenderness.  UTERUS:  Normal size, shape, consistency, no mass or tenderness.  ADNEXA:  Normal in size without mass or tenderness    IMP:  4 Weeks Postpartum  Status Post   Postpartum exam    Gallstones    Encounter for initial prescription of contraceptive pills  -     drospirenone, contraceptive, (SLYND) 4 mg (28) Tab; Take 1 tablet (4 mg total) by mouth once daily.  Dispense: 28 tablet; Refill: 12    Encounter for breast feeding counseling  -     metoclopramide HCl (REGLAN) 10 MG tablet; Take 1 " tablet (10 mg total) by mouth 3 (three) times daily with meals.  Dispense: 90 tablet; Refill: 1        ICD-10-CM ICD-9-CM    1. Postpartum exam  Z39.2 V24.2       2. Gallstones  K80.20 574.20       3. Encounter for initial prescription of contraceptive pills  Z30.011 V25.01 drospirenone, contraceptive, (SLYND) 4 mg (28) Tab      4. Encounter for breast feeding counseling  Z71.89 V65.49 metoclopramide HCl (REGLAN) 10 MG tablet          PLAN:  May resume normal activities after 8 weeks postpartum/post  section  May be released to return to work 8 weeks postpartum/post  section  Birth control options and counseling discussed  Discussed ACHES (abdominal pain, chest pain, headaches, epigastric pain, stroke s/s or embolis/blood clot s/s) with oral contraceptives/Xulane or Ortho Evra Patch/NuvaRing use, if noted, F/U @ ER  or clinic for evaluation  Use back up method for first month's use of oral contraceptives/Xulane or Ortho Evra Patch/NuvaRing, if on antibiotics, or if not taking pills correctly  Discussed oral contraceptives/Xulane or Ortho Evra Patch/NuvaRing, does not protect against STIs/STDs  The use of the oral contraceptive has been fully discussed with the patient. This includes the proper method to initiate and continue the pills, the need for regular compliance to ensure adequate contraceptive effect, the physiology which make the pill effective, the instructions for what to do in event of a missed pill, and warnings about anticipated minor side effects such as breakthrough spotting, nausea, breast tenderness, weight changes, acne, headaches, etc.  She has been told of the more serious potential side effects such as MI, stroke, and deep vein thrombosis, all of which are very unlikely.  She has been asked to report any signs of such serious problems immediately.  She should back up the pill with a condom during any cycle in which antibiotics are prescribed, and during the first cycle as well.  The need for additional protection, such as a condom, to prevent exposure to sexually transmitted diseases has also been discussed- the patient has been clearly reminded that OCP's cannot protect her against diseases such as HIV and others. She understands and wishes to take the medication as prescribed.  Has an appointment with Dr. Blanca for 10/24/2025 @ 2:45 pm for evaluation of gallbladder  Verbalized understanding to all information and instructions  Questions answered to desired level of satisfaction    Follow up if symptoms worsen or fail to improve, for has an appointment for annual exam in April 2025.

## 2024-10-28 ENCOUNTER — OFFICE VISIT (OUTPATIENT)
Dept: SURGERY | Facility: CLINIC | Age: 29
End: 2024-10-28
Attending: SURGERY
Payer: MEDICAID

## 2024-10-28 VITALS — HEIGHT: 65 IN | WEIGHT: 155 LBS | BODY MASS INDEX: 25.83 KG/M2

## 2024-10-28 DIAGNOSIS — K80.20 GALLSTONES: ICD-10-CM

## 2024-10-28 PROCEDURE — 99999 PR PBB SHADOW E&M-EST. PATIENT-LVL IV: CPT | Mod: PBBFAC,,, | Performed by: SURGERY

## 2024-10-28 PROCEDURE — 3008F BODY MASS INDEX DOCD: CPT | Mod: CPTII,,, | Performed by: SURGERY

## 2024-10-28 PROCEDURE — 1159F MED LIST DOCD IN RCRD: CPT | Mod: CPTII,,, | Performed by: SURGERY

## 2024-10-28 PROCEDURE — 99214 OFFICE O/P EST MOD 30 MIN: CPT | Mod: PBBFAC | Performed by: SURGERY

## 2024-10-28 PROCEDURE — 99203 OFFICE O/P NEW LOW 30 MIN: CPT | Mod: S$PBB,,, | Performed by: SURGERY

## 2024-10-28 PROCEDURE — 3044F HG A1C LEVEL LT 7.0%: CPT | Mod: CPTII,,, | Performed by: SURGERY

## 2024-11-07 ENCOUNTER — LAB REQUISITION (OUTPATIENT)
Dept: LAB | Facility: HOSPITAL | Age: 29
End: 2024-11-07
Attending: SURGERY
Payer: MEDICAID

## 2024-11-07 ENCOUNTER — OUTSIDE PLACE OF SERVICE (OUTPATIENT)
Dept: SURGERY | Facility: CLINIC | Age: 29
End: 2024-11-07
Payer: MEDICAID

## 2024-11-07 DIAGNOSIS — K80.20 CALCULUS OF GALLBLADDER WITHOUT CHOLECYSTITIS WITHOUT OBSTRUCTION: ICD-10-CM

## 2024-11-07 PROCEDURE — 88304 TISSUE EXAM BY PATHOLOGIST: CPT | Mod: TC,SUR | Performed by: SURGERY

## 2024-11-07 PROCEDURE — 88304 TISSUE EXAM BY PATHOLOGIST: CPT | Mod: 26,,, | Performed by: PATHOLOGY

## 2024-11-08 LAB
ESTROGEN SERPL-MCNC: NORMAL PG/ML
INSULIN SERPL-ACNC: NORMAL U[IU]/ML
LAB AP CLINICAL INFORMATION: NORMAL
LAB AP GROSS DESCRIPTION: NORMAL
LAB AP LABORATORY NOTES: NORMAL
T3RU NFR SERPL: NORMAL %

## 2024-11-22 ENCOUNTER — PATIENT MESSAGE (OUTPATIENT)
Dept: RESEARCH | Facility: HOSPITAL | Age: 29
End: 2024-11-22
Payer: MEDICAID

## 2024-12-12 ENCOUNTER — TELEPHONE (OUTPATIENT)
Dept: SURGERY | Facility: CLINIC | Age: 29
End: 2024-12-12
Payer: MEDICAID

## 2024-12-12 NOTE — TELEPHONE ENCOUNTER
----- Message from Cassie sent at 12/12/2024  2:39 PM CST -----  Who Called: Alana Hdez    Preferred Method of Contact: Phone Call  Patient's Preferred Phone Number on File: [phone]   Best Call Back Number, if different:  Additional Information: pt needs work excuse faxed over or emailed     Fax number: [phone].  Email:[email or web address]  [email or web address]

## 2024-12-13 ENCOUNTER — TELEPHONE (OUTPATIENT)
Dept: SURGERY | Facility: CLINIC | Age: 29
End: 2024-12-13
Payer: MEDICAID

## 2024-12-13 NOTE — TELEPHONE ENCOUNTER
----- Message from Bridget sent at 12/13/2024  9:27 AM CST -----  Regarding: WORK EXCUSE FOR MOTHER  Who Called: GEORGIANA INGRAM, MOTHER      Who Left Message for Patient:  Does the patient know what this is regarding?:      Preferred Method of Contact: Phone Call  Patient's Preferred Phone Number on File: [phone]  Best Call Back Number, if different:  Additional Information: MOTHER IS NEEDING A WORK EXCUSE FOR HERSELF, SAYING THAT SHE WAS WITH HER DAUGHTER DURING HER SURGERY. PLEASE EMAIL TO :  [email or web address].

## 2024-12-16 ENCOUNTER — OFFICE VISIT (OUTPATIENT)
Dept: SURGERY | Facility: CLINIC | Age: 29
End: 2024-12-16
Attending: SURGERY
Payer: MEDICAID

## 2024-12-16 VITALS — BODY MASS INDEX: 24.16 KG/M2 | HEIGHT: 65 IN | WEIGHT: 145 LBS

## 2024-12-16 DIAGNOSIS — Z09 FOLLOW-UP EXAMINATION, FOLLOWING OTHER SURGERY: Primary | ICD-10-CM

## 2024-12-16 PROCEDURE — 99024 POSTOP FOLLOW-UP VISIT: CPT | Mod: ,,, | Performed by: SURGERY

## 2024-12-16 PROCEDURE — 3044F HG A1C LEVEL LT 7.0%: CPT | Mod: CPTII,,, | Performed by: SURGERY

## 2024-12-16 PROCEDURE — 99999 PR PBB SHADOW E&M-EST. PATIENT-LVL III: CPT | Mod: PBBFAC,,, | Performed by: SURGERY

## 2024-12-16 PROCEDURE — 99213 OFFICE O/P EST LOW 20 MIN: CPT | Mod: PBBFAC | Performed by: SURGERY

## 2024-12-16 PROCEDURE — 1159F MED LIST DOCD IN RCRD: CPT | Mod: CPTII,,, | Performed by: SURGERY

## 2024-12-16 NOTE — PROGRESS NOTES
Post-operative Note    HPI:  The patient is status post lap choly doing well    PHYSICAL EXAM:    Incision well healed clean dry intact    No results found for this or any previous visit (from the past 3 weeks).     ASSESSMENT:      The patient is doing well after surgery.       PLAN:      Follow up PRN.

## 2025-02-03 ENCOUNTER — OFFICE VISIT (OUTPATIENT)
Dept: OBSTETRICS AND GYNECOLOGY | Facility: CLINIC | Age: 30
End: 2025-02-03
Payer: MEDICAID

## 2025-02-03 VITALS
BODY MASS INDEX: 25.36 KG/M2 | DIASTOLIC BLOOD PRESSURE: 69 MMHG | OXYGEN SATURATION: 99 % | HEIGHT: 65 IN | TEMPERATURE: 98 F | RESPIRATION RATE: 16 BRPM | SYSTOLIC BLOOD PRESSURE: 110 MMHG | WEIGHT: 152.19 LBS | HEART RATE: 100 BPM

## 2025-02-03 DIAGNOSIS — Z30.9 ENCOUNTER FOR CONTRACEPTIVE MANAGEMENT, UNSPECIFIED TYPE: Primary | ICD-10-CM

## 2025-02-03 LAB
B-HCG UR QL: NEGATIVE
CTP QC/QA: YES

## 2025-02-03 PROCEDURE — 99213 OFFICE O/P EST LOW 20 MIN: CPT | Mod: ,,, | Performed by: ADVANCED PRACTICE MIDWIFE

## 2025-02-03 PROCEDURE — 3074F SYST BP LT 130 MM HG: CPT | Mod: CPTII,,, | Performed by: ADVANCED PRACTICE MIDWIFE

## 2025-02-03 PROCEDURE — 3078F DIAST BP <80 MM HG: CPT | Mod: CPTII,,, | Performed by: ADVANCED PRACTICE MIDWIFE

## 2025-02-03 PROCEDURE — 1159F MED LIST DOCD IN RCRD: CPT | Mod: CPTII,,, | Performed by: ADVANCED PRACTICE MIDWIFE

## 2025-02-03 PROCEDURE — 81025 URINE PREGNANCY TEST: CPT | Mod: QW,,, | Performed by: ADVANCED PRACTICE MIDWIFE

## 2025-02-03 PROCEDURE — 3008F BODY MASS INDEX DOCD: CPT | Mod: CPTII,,, | Performed by: ADVANCED PRACTICE MIDWIFE

## 2025-02-03 NOTE — PROGRESS NOTES
Subjective     Patient ID: Alana Hdez is a 30 y.o. female.    Chief Complaint: Contraception (Patient wants to talk about switching birth control and getting back on anxiety meds)    Here for f/u birth control. Requests nexplanon for birth control. LMP 1/20/2025 and was a normal menstrual cycle. Currently uses birth control pills.       Review of Systems   All other systems reviewed and are negative.         Objective     Physical Exam  Vitals reviewed.   Constitutional:       Appearance: Normal appearance.   Pulmonary:      Effort: Pulmonary effort is normal.   Skin:     General: Skin is warm and dry.   Neurological:      General: No focal deficit present.      Mental Status: She is alert and oriented to person, place, and time.   Psychiatric:         Mood and Affect: Mood normal.         Behavior: Behavior normal.         Thought Content: Thought content normal.         Judgment: Judgment normal.            Assessment and Plan     1. Encounter for contraceptive management, unspecified type  -     POCT urine pregnancy        Nexplanon information provided  F/u tomorrow for insertion of nexplanon.   Verbalized understanding to all instructions and information  Questions answered to desired level of satisfaction           Follow up in about 1 day (around 2/4/2025), or if symptoms worsen or fail to improve, for nexplanon insertion.

## 2025-02-04 ENCOUNTER — PROCEDURE VISIT (OUTPATIENT)
Dept: OBSTETRICS AND GYNECOLOGY | Facility: CLINIC | Age: 30
End: 2025-02-04
Payer: MEDICAID

## 2025-02-04 VITALS
SYSTOLIC BLOOD PRESSURE: 122 MMHG | DIASTOLIC BLOOD PRESSURE: 71 MMHG | BODY MASS INDEX: 25.19 KG/M2 | HEIGHT: 65 IN | OXYGEN SATURATION: 99 % | RESPIRATION RATE: 16 BRPM | TEMPERATURE: 98 F | HEART RATE: 93 BPM | WEIGHT: 151.19 LBS

## 2025-02-04 DIAGNOSIS — Z30.9 ENCOUNTER FOR CONTRACEPTIVE MANAGEMENT, UNSPECIFIED TYPE: ICD-10-CM

## 2025-02-04 DIAGNOSIS — Z30.017 NEXPLANON INSERTION: Primary | ICD-10-CM

## 2025-02-04 LAB
B-HCG UR QL: NEGATIVE
CTP QC/QA: YES

## 2025-02-04 PROCEDURE — 11981 INSERTION DRUG DLVR IMPLANT: CPT | Mod: ,,, | Performed by: ADVANCED PRACTICE MIDWIFE

## 2025-02-04 PROCEDURE — 99499 UNLISTED E&M SERVICE: CPT | Mod: ,,, | Performed by: ADVANCED PRACTICE MIDWIFE

## 2025-02-04 PROCEDURE — 81025 URINE PREGNANCY TEST: CPT | Mod: QW,,, | Performed by: ADVANCED PRACTICE MIDWIFE

## 2025-02-04 NOTE — PROCEDURES
Insertion of Nexplanon    Date/Time: 2/4/2025 8:00 AM    Performed by: Marissa Hutchins CNM  Authorized by: Marissa Hutchins CNM    Consent:   Consent given by:  Patient  Patient questions answered: yes    Patient agrees, verbalizes understanding, and wants to proceed: yes    Educational handouts given: yes    Instructions and paperwork completed: yes    Pre-Procedure:   Pre-procedure timeout performed: yes    Local anesthetic:  Lidocaine 1%  The site was cleaned and prepped in a sterile fashion: yes    Insertion Procedure:   Left/right:  left arm   68 mg etonogestreL 68 mg  Preloaded Implanon trocar was placed subdermally: yes    Visualization of implant was obtained: yes    Nexplanon was inserted and trocar removed: yes    Visualization of notch in stilette and palpitation of device: yes    Palpation confirms placement by provider and patient: yes    Site was closed with steri-strips and pressure bandage applied: yes

## 2025-07-03 ENCOUNTER — OFFICE VISIT (OUTPATIENT)
Dept: OBSTETRICS AND GYNECOLOGY | Facility: CLINIC | Age: 30
End: 2025-07-03
Payer: MEDICAID

## 2025-07-03 VITALS
BODY MASS INDEX: 27.61 KG/M2 | DIASTOLIC BLOOD PRESSURE: 88 MMHG | SYSTOLIC BLOOD PRESSURE: 130 MMHG | WEIGHT: 165.69 LBS | HEART RATE: 79 BPM | HEIGHT: 65 IN | OXYGEN SATURATION: 100 % | RESPIRATION RATE: 16 BRPM

## 2025-07-03 DIAGNOSIS — Z11.4 SCREENING FOR HIV (HUMAN IMMUNODEFICIENCY VIRUS): ICD-10-CM

## 2025-07-03 DIAGNOSIS — Z72.51 HIGH RISK HETEROSEXUAL BEHAVIOR: ICD-10-CM

## 2025-07-03 DIAGNOSIS — Z11.3 SCREEN FOR SEXUALLY TRANSMITTED DISEASES: Primary | ICD-10-CM

## 2025-07-03 DIAGNOSIS — N89.8 VAGINAL DISCHARGE: ICD-10-CM

## 2025-07-03 DIAGNOSIS — R39.15 URINARY URGENCY: ICD-10-CM

## 2025-07-03 LAB
BACTERIAL VAGINOSIS DNA (OHS): POSITIVE
BILIRUB SERPL-MCNC: NEGATIVE MG/DL
BLOOD, POC UA: NEGATIVE
CANDIDA GLABRATA/KRUSEI DNA (OHS): NOT DETECTED
CANDIDA SPECIES DNA (OHS): DETECTED
GLUCOSE UR QL STRIP: NEGATIVE
HAV IGM SER QL: NORMAL
HBV CORE IGM SER QL: NORMAL
HBV SURFACE AG SERPL QL IA: NORMAL
HCV AB SER QL: NORMAL
HIV 1+O+2 AB SERPL QL: NORMAL
KETONES UR QL STRIP: NEGATIVE
LEUKOCYTE ESTERASE URINE, POC: NORMAL
NITRITE, POC UA: NEGATIVE
PH, POC UA: 5.5
PROTEIN, POC: NEGATIVE
SPECIFIC GRAVITY, POC UA: 1.01
SYPHILIS AB INTERPRETATION: NORMAL
TRICHOMONAS VAGINALIS DNA (OHS): NOT DETECTED
UROBILINOGEN, POC UA: 1

## 2025-07-03 PROCEDURE — 36415 COLL VENOUS BLD VENIPUNCTURE: CPT | Mod: ,,, | Performed by: ADVANCED PRACTICE MIDWIFE

## 2025-07-03 PROCEDURE — 3008F BODY MASS INDEX DOCD: CPT | Mod: CPTII,,, | Performed by: ADVANCED PRACTICE MIDWIFE

## 2025-07-03 PROCEDURE — 87491 CHLMYD TRACH DNA AMP PROBE: CPT | Mod: ,,, | Performed by: CLINICAL MEDICAL LABORATORY

## 2025-07-03 PROCEDURE — 80074 ACUTE HEPATITIS PANEL: CPT | Mod: ,,, | Performed by: CLINICAL MEDICAL LABORATORY

## 2025-07-03 PROCEDURE — 86780 TREPONEMA PALLIDUM: CPT | Mod: ,,, | Performed by: CLINICAL MEDICAL LABORATORY

## 2025-07-03 PROCEDURE — 87389 HIV-1 AG W/HIV-1&-2 AB AG IA: CPT | Mod: ,,, | Performed by: CLINICAL MEDICAL LABORATORY

## 2025-07-03 PROCEDURE — 1159F MED LIST DOCD IN RCRD: CPT | Mod: CPTII,,, | Performed by: ADVANCED PRACTICE MIDWIFE

## 2025-07-03 PROCEDURE — 3075F SYST BP GE 130 - 139MM HG: CPT | Mod: CPTII,,, | Performed by: ADVANCED PRACTICE MIDWIFE

## 2025-07-03 PROCEDURE — 99214 OFFICE O/P EST MOD 30 MIN: CPT | Mod: ,,, | Performed by: ADVANCED PRACTICE MIDWIFE

## 2025-07-03 PROCEDURE — 81515 NFCT DS BV&VAGINITIS DNA ALG: CPT | Mod: QW,,, | Performed by: CLINICAL MEDICAL LABORATORY

## 2025-07-03 PROCEDURE — 3079F DIAST BP 80-89 MM HG: CPT | Mod: CPTII,,, | Performed by: ADVANCED PRACTICE MIDWIFE

## 2025-07-03 PROCEDURE — 81003 URINALYSIS AUTO W/O SCOPE: CPT | Mod: QW,,, | Performed by: ADVANCED PRACTICE MIDWIFE

## 2025-07-03 PROCEDURE — 87591 N.GONORRHOEAE DNA AMP PROB: CPT | Mod: ,,, | Performed by: CLINICAL MEDICAL LABORATORY

## 2025-07-03 RX ORDER — METRONIDAZOLE 500 MG/1
500 TABLET ORAL 2 TIMES DAILY
Qty: 14 TABLET | Refills: 0 | Status: SHIPPED | OUTPATIENT
Start: 2025-07-03 | End: 2025-07-10

## 2025-07-03 RX ORDER — FLUCONAZOLE 150 MG/1
150 TABLET ORAL DAILY
Qty: 1 TABLET | Refills: 0 | Status: SHIPPED | OUTPATIENT
Start: 2025-07-03 | End: 2025-07-04

## 2025-07-03 NOTE — PROGRESS NOTES
Subjective     Patient ID: Alana Hdez is a 30 y.o. female.    Chief Complaint: STD CHECK    Here for STD testing. States she is unsure of any STD exposure. She indicates she is sexually active and her partner does not use a condom.     She also c/o some urine leakage and urgency.     Exposure to STD   The patient's pertinent negatives include no discharge, dyspareunia, dysuria, genital itching, genital lesions, genital rash or pelvic pain. Pertinent negatives include no abdominal pain, anorexia, diaphoresis, fever, genital odor, rectal pain, sore throat or urinary frequency. She has tried nothing for the symptoms.     Review of Systems   Constitutional:  Negative for diaphoresis and fever.   HENT:  Negative for sore throat.    Gastrointestinal:  Negative for abdominal pain, anorexia and rectal pain.   Genitourinary:  Negative for dyspareunia, dysuria, frequency and pelvic pain.          Objective     Physical Exam  Vitals reviewed. Exam conducted with a chaperone present.   Constitutional:       Appearance: Normal appearance.   Genitourinary:     General: Normal vulva.      Exam position: Lithotomy position.      Vagina: Vaginal discharge present. No bleeding or lesions.      Cervix: Discharge present.      Comments: Thick white discharge  Skin:     General: Skin is warm and dry.   Neurological:      General: No focal deficit present.      Mental Status: She is alert and oriented to person, place, and time.   Psychiatric:         Mood and Affect: Mood normal.         Behavior: Behavior normal.         Thought Content: Thought content normal.         Judgment: Judgment normal.            Assessment and Plan     1. Screen for sexually transmitted diseases  -     Hepatitis Panel, Acute; Future; Expected date: 07/03/2025  -     Syphilis Antibody with reflex to RPR; Future; Expected date: 07/03/2025  -     Chlamydia/GC, PCR; Future; Expected date: 07/03/2025  -     Vaginosis Screen by DNA Probe; Future;  Expected date: 07/03/2025  -     HSV 1 & 2, IgG; Future; Expected date: 07/03/2025    2. Screening for HIV (human immunodeficiency virus)  -     HIV 1/2 Ag/Ab (4th Gen); Future; Expected date: 07/03/2025    3. High risk heterosexual behavior  -     HIV 1/2 Ag/Ab (4th Gen); Future; Expected date: 07/03/2025  -     Hepatitis Panel, Acute; Future; Expected date: 07/03/2025  -     Syphilis Antibody with reflex to RPR; Future; Expected date: 07/03/2025  -     Chlamydia/GC, PCR; Future; Expected date: 07/03/2025  -     Vaginosis Screen by DNA Probe; Future; Expected date: 07/03/2025  -     HSV 1 & 2, IgG; Future; Expected date: 07/03/2025    4. Urinary urgency  -     POCT Urinalysis    5. Vaginal discharge  -     metroNIDAZOLE (FLAGYL) 500 MG tablet; Take 1 tablet (500 mg total) by mouth 2 (two) times daily. for 7 days  Dispense: 14 tablet; Refill: 0  -     fluconazole (DIFLUCAN) 150 MG Tab; Take 1 tablet (150 mg total) by mouth once daily. for 1 day  Dispense: 1 tablet; Refill: 0      STD precautions discussed  Kegel exercises and decrease caffeine intake  F/u as needed  Discussed possible anticholinergics such as Mybertiq and Detrol LA however she indicates she is currently being monitored for glaucoma by her optometrist. Therefore, a mutual discussion and agreement were decided for non medicinal interventions          Follow up in about 1 week (around 7/10/2025), or if symptoms worsen or fail to improve.

## 2025-07-04 LAB
CHLAMYDIA BY PCR: NEGATIVE
N. GONORRHOEAE (GC) BY PCR: NEGATIVE

## 2025-08-07 ENCOUNTER — OFFICE VISIT (OUTPATIENT)
Dept: OBSTETRICS AND GYNECOLOGY | Facility: CLINIC | Age: 30
End: 2025-08-07
Payer: MEDICAID

## 2025-08-07 VITALS
HEART RATE: 94 BPM | DIASTOLIC BLOOD PRESSURE: 62 MMHG | HEIGHT: 65 IN | TEMPERATURE: 98 F | BODY MASS INDEX: 27.82 KG/M2 | SYSTOLIC BLOOD PRESSURE: 121 MMHG | WEIGHT: 167 LBS | OXYGEN SATURATION: 99 % | RESPIRATION RATE: 18 BRPM

## 2025-08-07 DIAGNOSIS — Z01.419 ENCOUNTER FOR WELL WOMAN EXAM WITH ROUTINE GYNECOLOGICAL EXAM: Primary | ICD-10-CM

## 2025-08-07 DIAGNOSIS — Z86.19 HISTORY OF POSITIVE PCR FOR HERPES SIMPLEX VIRUS TYPE 1 (HSV-1) DNA: ICD-10-CM

## 2025-08-07 DIAGNOSIS — N63.0 LUMP IN FEMALE BREAST: ICD-10-CM

## 2025-08-07 DIAGNOSIS — Z86.19 HISTORY OF POSITIVE PCR FOR HERPES SIMPLEX VIRUS TYPE 2 (HSV-2) DNA: ICD-10-CM

## 2025-08-07 DIAGNOSIS — Z30.46 ENCOUNTER FOR SURVEILLANCE OF NEXPLANON SUBDERMAL CONTRACEPTIVE: ICD-10-CM

## 2025-08-07 DIAGNOSIS — N89.8 VAGINAL DISCHARGE: ICD-10-CM

## 2025-08-07 PROCEDURE — 99395 PREV VISIT EST AGE 18-39: CPT | Mod: ,,, | Performed by: ADVANCED PRACTICE MIDWIFE

## 2025-08-07 PROCEDURE — 1159F MED LIST DOCD IN RCRD: CPT | Mod: CPTII,,, | Performed by: ADVANCED PRACTICE MIDWIFE

## 2025-08-07 PROCEDURE — 88141 CYTOPATH C/V INTERPRET: CPT | Mod: ,,, | Performed by: PATHOLOGY

## 2025-08-07 PROCEDURE — 3008F BODY MASS INDEX DOCD: CPT | Mod: CPTII,,, | Performed by: ADVANCED PRACTICE MIDWIFE

## 2025-08-07 PROCEDURE — 3078F DIAST BP <80 MM HG: CPT | Mod: CPTII,,, | Performed by: ADVANCED PRACTICE MIDWIFE

## 2025-08-07 PROCEDURE — 3074F SYST BP LT 130 MM HG: CPT | Mod: CPTII,,, | Performed by: ADVANCED PRACTICE MIDWIFE

## 2025-08-07 PROCEDURE — 88142 CYTOPATH C/V THIN LAYER: CPT | Mod: TC,GCY | Performed by: ADVANCED PRACTICE MIDWIFE

## 2025-08-07 RX ORDER — FLUCONAZOLE 150 MG/1
150 TABLET ORAL DAILY
Qty: 1 TABLET | Refills: 3 | Status: SHIPPED | OUTPATIENT
Start: 2025-08-07 | End: 2025-08-08

## 2025-08-07 RX ORDER — VALACYCLOVIR HYDROCHLORIDE 500 MG/1
500 TABLET, FILM COATED ORAL DAILY
Qty: 30 TABLET | Refills: 5 | Status: SHIPPED | OUTPATIENT
Start: 2025-08-07 | End: 2025-08-12

## 2025-08-07 RX ORDER — METRONIDAZOLE 500 MG/1
500 TABLET ORAL 2 TIMES DAILY
Qty: 14 TABLET | Refills: 3 | Status: SHIPPED | OUTPATIENT
Start: 2025-08-07 | End: 2025-08-14

## 2025-08-12 LAB
GH SERPL-MCNC: ABNORMAL NG/ML
INSULIN SERPL-ACNC: ABNORMAL U[IU]/ML
LAB AP CLINICAL INFORMATION: ABNORMAL
LAB AP GYN INTERPRETATION: ABNORMAL
LAB AP PAP DISCLAIMER COMMENTS: ABNORMAL
RENIN PLAS-CCNC: ABNORMAL NG/ML/H

## 2025-08-14 ENCOUNTER — HOSPITAL ENCOUNTER (OUTPATIENT)
Dept: RADIOLOGY | Facility: HOSPITAL | Age: 30
Discharge: HOME | End: 2025-08-14
Attending: ADVANCED PRACTICE MIDWIFE
Payer: MEDICAID

## 2025-08-14 DIAGNOSIS — N63.0 LUMP IN FEMALE BREAST: ICD-10-CM

## 2025-08-14 PROCEDURE — 76641 ULTRASOUND BREAST COMPLETE: CPT | Mod: TC,50

## 2025-08-25 ENCOUNTER — DOCUMENTATION ONLY (OUTPATIENT)
Dept: OBSTETRICS AND GYNECOLOGY | Facility: CLINIC | Age: 30
End: 2025-08-25
Payer: MEDICAID

## 2025-08-25 DIAGNOSIS — Z30.46 ENCOUNTER FOR SURVEILLANCE OF NEXPLANON SUBDERMAL CONTRACEPTIVE: Primary | ICD-10-CM

## 2025-08-25 RX ORDER — NORGESTIMATE AND ETHINYL ESTRADIOL 0.25-0.035
KIT ORAL
Qty: 28 TABLET | Refills: 1 | Status: SHIPPED | OUTPATIENT
Start: 2025-08-25